# Patient Record
Sex: MALE | Race: BLACK OR AFRICAN AMERICAN | Employment: PART TIME | URBAN - METROPOLITAN AREA
[De-identification: names, ages, dates, MRNs, and addresses within clinical notes are randomized per-mention and may not be internally consistent; named-entity substitution may affect disease eponyms.]

---

## 2017-02-07 ENCOUNTER — OFFICE VISIT (OUTPATIENT)
Dept: FAMILY MEDICINE CLINIC | Age: 72
End: 2017-02-07

## 2017-02-07 ENCOUNTER — HOSPITAL ENCOUNTER (OUTPATIENT)
Dept: LAB | Age: 72
Discharge: HOME | End: 2017-02-07
Payer: MEDICARE

## 2017-02-07 VITALS
DIASTOLIC BLOOD PRESSURE: 74 MMHG | WEIGHT: 203.6 LBS | HEIGHT: 72 IN | OXYGEN SATURATION: 96 % | HEART RATE: 80 BPM | BODY MASS INDEX: 27.58 KG/M2 | SYSTOLIC BLOOD PRESSURE: 113 MMHG | RESPIRATION RATE: 12 BRPM | TEMPERATURE: 98.1 F

## 2017-02-07 DIAGNOSIS — D72.819 LEUKOPENIA, UNSPECIFIED TYPE: ICD-10-CM

## 2017-02-07 DIAGNOSIS — R60.0 LOCALIZED EDEMA: ICD-10-CM

## 2017-02-07 DIAGNOSIS — E87.6 HYPOKALEMIA: ICD-10-CM

## 2017-02-07 DIAGNOSIS — D69.6 PLATELETS DECREASED (HCC): ICD-10-CM

## 2017-02-07 DIAGNOSIS — E53.8 B12 DEFICIENCY: ICD-10-CM

## 2017-02-07 DIAGNOSIS — E78.00 HYPERCHOLESTEREMIA: ICD-10-CM

## 2017-02-07 DIAGNOSIS — E55.9 VITAMIN D DEFICIENCY: ICD-10-CM

## 2017-02-07 DIAGNOSIS — Z13.5 SCREENING FOR GLAUCOMA: ICD-10-CM

## 2017-02-07 DIAGNOSIS — R35.1 NOCTURIA: ICD-10-CM

## 2017-02-07 DIAGNOSIS — I10 ESSENTIAL HYPERTENSION: Primary | ICD-10-CM

## 2017-02-07 DIAGNOSIS — R73.03 PRE-DIABETES: ICD-10-CM

## 2017-02-07 LAB
BILIRUB UR QL STRIP: NEGATIVE
GLUCOSE UR-MCNC: NEGATIVE MG/DL
KETONES P FAST UR STRIP-MCNC: NEGATIVE MG/DL
PH UR STRIP: 7 [PH] (ref 4.6–8)
PROT UR QL STRIP: NEGATIVE MG/DL
SP GR UR STRIP: 1.02 (ref 1–1.03)
UA UROBILINOGEN AMB POC: NORMAL (ref 0.2–1)
URINALYSIS CLARITY POC: CLEAR
URINALYSIS COLOR POC: YELLOW
URINE BLOOD POC: NORMAL
URINE LEUKOCYTES POC: NEGATIVE
URINE NITRITES POC: NEGATIVE

## 2017-02-07 PROCEDURE — 82607 VITAMIN B-12: CPT

## 2017-02-07 PROCEDURE — 84153 ASSAY OF PSA TOTAL: CPT

## 2017-02-07 PROCEDURE — 80053 COMPREHEN METABOLIC PANEL: CPT

## 2017-02-07 PROCEDURE — 36415 COLL VENOUS BLD VENIPUNCTURE: CPT

## 2017-02-07 PROCEDURE — 85027 COMPLETE CBC AUTOMATED: CPT

## 2017-02-07 PROCEDURE — 80061 LIPID PANEL: CPT

## 2017-02-07 PROCEDURE — 84443 ASSAY THYROID STIM HORMONE: CPT

## 2017-02-07 RX ORDER — LISINOPRIL AND HYDROCHLOROTHIAZIDE 20; 25 MG/1; MG/1
TABLET ORAL
Qty: 90 TAB | Refills: 3 | Status: SHIPPED | OUTPATIENT
Start: 2017-02-07 | End: 2017-02-07 | Stop reason: SDUPTHER

## 2017-02-07 RX ORDER — GLUCOSAMINE SULFATE 1500 MG
POWDER IN PACKET (EA) ORAL
Qty: 30 CAP | Refills: 11 | Status: SHIPPED | OUTPATIENT
Start: 2017-02-07 | End: 2020-03-23 | Stop reason: SDUPTHER

## 2017-02-07 RX ORDER — FUROSEMIDE 40 MG/1
40 TABLET ORAL DAILY
Qty: 30 TAB | Refills: 1 | Status: SHIPPED | OUTPATIENT
Start: 2017-02-07 | End: 2017-03-21 | Stop reason: ALTCHOICE

## 2017-02-07 RX ORDER — POTASSIUM CHLORIDE 20 MEQ/1
20 TABLET, EXTENDED RELEASE ORAL DAILY
Qty: 30 TAB | Refills: 1 | Status: SHIPPED | OUTPATIENT
Start: 2017-02-07 | End: 2017-03-21 | Stop reason: SDUPTHER

## 2017-02-07 RX ORDER — GUAIFENESIN 100 MG/5ML
LIQUID (ML) ORAL
Qty: 30 TAB | Refills: 11 | Status: SHIPPED | OUTPATIENT
Start: 2017-02-07 | End: 2017-05-02 | Stop reason: SDUPTHER

## 2017-02-07 RX ORDER — LISINOPRIL AND HYDROCHLOROTHIAZIDE 20; 25 MG/1; MG/1
TABLET ORAL
Qty: 90 TAB | Refills: 3
Start: 2017-02-07 | End: 2017-08-02 | Stop reason: ALTCHOICE

## 2017-02-07 NOTE — PROGRESS NOTES
HISTORY OF PRESENT ILLNESS  Fe Bañuelos is a 73 y.o. male. HPI         HTN  Today pt present for Bp check and the patient stating that so far there has been a Compliancy w/ the bp meds, having had the low salt diet and  try to the best of pt's knowledge to avoid smoked meats,   the patient has been active life style and does do the daily walking,    ++ legs swelling no lightheadedness,    Having nl interest to do things not feeling depressed sleeping well    b12 def  Not having a vegetarian diet, not feeling tired, compliant with his [id number]      Current Outpatient Prescriptions   Medication Sig Dispense Refill    lisinopril-hydrochlorothiazide (PRINZIDE, ZESTORETIC) 20-25 mg per tablet TAKE ONE TABLET BY MOUTH EVERY DAY 90 Tab 3    aspirin (CHILDREN'S ASPIRIN) 81 mg chewable tablet TAKE ONE TABLET BY MOUTH EVERY DAY 30 Tab 11    Cholecalciferol, Vitamin D3, (VITAMIN D3) 1,000 unit cap One tab daily 30 Cap 11    cyanocobalamin 2,500 mcg Subl 1 Tab by SubLINGual route daily. 30 Tab 6    potassium chloride (K-DUR, KLOR-CON) 20 mEq tablet Take 1 Tab by mouth daily.  30 Tab 0     No Known Allergies  Past Medical History   Diagnosis Date    Elevated PSA, less than 10 ng/ml 12/10/2012    Hypertension     Hypokalemia 7/25/2014    Vitamin D deficiency 7/8/2014    WBC decreased 7/6/2012     Past Surgical History   Procedure Laterality Date    Hx orthopaedic       left knee surg in  1984    Colonoscopy,diagnostic  2/5/2015           Family History   Problem Relation Age of Onset    Kidney Disease Father      Social History   Substance Use Topics    Smoking status: Never Smoker    Smokeless tobacco: Never Used    Alcohol use No      Lab Results  Component Value Date/Time   WBC 3.6 08/02/2016 09:34 AM   HGB 14.2 08/02/2016 09:34 AM   HCT 42.8 08/02/2016 09:34 AM   PLATELET 819 40/25/6542 09:34 AM   MCV 87 08/02/2016 09:34 AM       Lab Results  Component Value Date/Time   Hemoglobin A1c 6.5 01/13/2016 09:03 AM Hemoglobin A1c 6.3 07/13/2015 09:18 AM   Hemoglobin A1c 6.4 01/26/2015 10:21 AM   Glucose 103 08/02/2016 09:34 AM   LDL, calculated 87 08/02/2016 09:34 AM   Creatinine 1.23 08/02/2016 09:34 AM      Lab Results  Component Value Date/Time   Cholesterol, total 151 08/02/2016 09:34 AM   HDL Cholesterol 49 08/02/2016 09:34 AM   LDL, calculated 87 08/02/2016 09:34 AM   Triglyceride 74 08/02/2016 09:34 AM       Lab Results  Component Value Date/Time   ALT (SGPT) 19 08/02/2016 09:34 AM   AST (SGOT) 52 08/02/2016 09:34 AM   Alk. phosphatase 79 08/02/2016 09:34 AM   Bilirubin, total 0.2 08/02/2016 09:34 AM       Lab Results  Component Value Date/Time   GFR est AA 69 08/02/2016 09:34 AM   GFR est non-AA 60 08/02/2016 09:34 AM   Creatinine 1.23 08/02/2016 09:34 AM   BUN 9 08/02/2016 09:34 AM   Sodium 140 08/02/2016 09:34 AM   Potassium 4.0 08/02/2016 09:34 AM   Chloride 100 08/02/2016 09:34 AM   CO2 29 08/02/2016 09:34 AM      Lab Results  Component Value Date/Time   Prostate Specific Ag 2.3 01/26/2015 10:21 AM   Prostate Specific Ag 2.9 07/08/2014 12:37 PM   Prostate Specific Ag 2.2 11/18/2013 08:06 AM        Review of Systems   Constitutional: Negative for chills and fever. HENT: Negative for ear pain and nosebleeds. Eyes: Negative for blurred vision, pain and discharge. Respiratory: Negative for shortness of breath. Cardiovascular: Positive for leg swelling. Negative for chest pain. Gastrointestinal: Negative for constipation, diarrhea, nausea and vomiting. Genitourinary: Negative for frequency. Musculoskeletal: Negative for joint pain. Skin: Negative for itching and rash. Neurological: Negative for headaches. Psychiatric/Behavioral: Negative for depression. The patient is not nervous/anxious. Physical Exam   Constitutional: He is oriented to person, place, and time. He appears well-developed and well-nourished. HENT:   Head: Normocephalic and atraumatic.    Mouth/Throat: No oropharyngeal exudate. Eyes: Conjunctivae and EOM are normal.   Neck: Normal range of motion. Neck supple. Cardiovascular: Normal rate, regular rhythm and normal heart sounds. No murmur heard. Pulmonary/Chest: Effort normal and breath sounds normal. No respiratory distress. Abdominal: Soft. Bowel sounds are normal. He exhibits no distension. There is no rebound. Musculoskeletal: He exhibits edema. He exhibits no tenderness. 3+   Neurological: He is alert and oriented to person, place, and time. Skin: Skin is warm. No erythema. Psychiatric: He has a normal mood and affect. His behavior is normal.   Nursing note and vitals reviewed. ASSESSMENT and Anne-Marie Landon was seen today for hypertension. Diagnoses and all orders for this visit:    Essential hypertension  -     CBC W/O DIFF  -     AMB POC URINALYSIS DIP STICK AUTO W/O MICRO  -     TSH 3RD GENERATION  -     METABOLIC PANEL, COMPREHENSIVE  -     Discontinue: lisinopril-hydroCHLOROthiazide (PRINZIDE, ZESTORETIC) 20-25 mg per tablet; TAKE ONE TABLET BY MOUTH EVERY DAY  -     aspirin (CHILDREN'S ASPIRIN) 81 mg chewable tablet; TAKE ONE TABLET BY MOUTH EVERY DAY  -     lisinopril-hydroCHLOROthiazide (PRINZIDE, ZESTORETIC) 20-25 mg per tablet; COLLINS 1/2TABLET BY MOUTH EVERY DAY    Vitamin D deficiency  -     cholecalciferol (VITAMIN D3) 1,000 unit cap;  One tab daily    Hypercholesteremia  -     LIPID PANEL  -     aspirin (CHILDREN'S ASPIRIN) 81 mg chewable tablet; TAKE ONE TABLET BY MOUTH EVERY DAY    Screening for glaucoma  -     REFERRAL TO OPTOMETRY    B12 deficiency  -     VITAMIN B12 & FOLATE    Pre-diabetes  -     Discontinue: lisinopril-hydroCHLOROthiazide (PRINZIDE, ZESTORETIC) 20-25 mg per tablet; TAKE ONE TABLET BY MOUTH EVERY DAY  -     aspirin (CHILDREN'S ASPIRIN) 81 mg chewable tablet; TAKE ONE TABLET BY MOUTH EVERY DAY  -     lisinopril-hydroCHLOROthiazide (PRINZIDE, ZESTORETIC) 20-25 mg per tablet; COLLINS 1/2TABLET BY MOUTH EVERY DAY    Platelets decreased ([address]  -     CBC W/O DIFF  -     TSH 3RD GENERATION  -     METABOLIC PANEL, COMPREHENSIVE  -     aspirin (CHILDREN'S ASPIRIN) 81 mg chewable tablet; TAKE ONE TABLET BY MOUTH EVERY DAY  -     PSA - PROSTATE SPECIFIC AG    Leukopenia, unspecified type  -     CBC W/O DIFF  -     TSH 3RD GENERATION  -     METABOLIC PANEL, COMPREHENSIVE  -     aspirin (CHILDREN'S ASPIRIN) 81 mg chewable tablet; TAKE ONE TABLET BY MOUTH EVERY DAY  -     PSA - PROSTATE SPECIFIC AG    Hypokalemia  -     METABOLIC PANEL, COMPREHENSIVE    Nocturia   -     PSA - PROSTATE SPECIFIC AG    Localized edema  -     furosemide (LASIX) 40 mg tablet; Take 1 Tab by mouth daily. -     potassium chloride (K-DUR, KLOR-CON) 20 mEq tablet; Take 1 Tab by mouth daily. -     lisinopril-hydroCHLOROthiazide (PRINZIDE, ZESTORETIC) 20-25 mg per tablet; COLLINS 1/2TABLET BY MOUTH EVERY DAY    was told rtc in 6 weeks for the leg swelling reeval and bp check  Cont with an active life style mod,for which includes to do list such as the light start of physical activities with a daily brisk walking 30 minutes most days of the week,  Trying to avoid fatty fast foods, have and continue with low-fat low-cholesterol diet,adding fatty fish such as Lawayne Calkin, [place]rel, [place] to the diet 3-4 times per week and finally have a low-salt and K rich food intake, all mention has to be done at least most days of the weeks for a better outcome than needed labs will be repeated in 3-6 months.   Handout also given to the patient for a better understanding,   Patient agreed with today's recommendation

## 2017-02-07 NOTE — MR AVS SNAPSHOT
Visit Information Date & Time Provider Department Dept. Phone Encounter #  
 2/7/2017  8:30 AM Car Chopra MD  [facility] OFFICE-ANN[facility] [phone] [id number] Follow-up Instructions Return in about 6 months (around 8/7/2017), or if symptoms worsen or fail to improve. Follow-up and Disposition History Upcoming Health Maintenance Date Due  
 GLAUCOMA SCREENING Q2Y 2/9/2012 MEDICARE YEARLY EXAM 8/3/2017 COLON CANCER SCRN (BARIUM / CT COLO / FLX SIG) Q5 2/5/2020 DTaP/Tdap/Td series (2 - Td) 7/8/2024 Allergies as of 2/7/2017  Review Complete On: 8/2/2016 By: Bisi Covarrubias LPN No Known Allergies Current Immunizations  Reviewed on 2/7/2017 Name Date Pneumococcal Conjugate (PCV-13) 7/13/2015  9:26 AM  
 Pneumococcal Polysaccharide (PPSV-23) 7/8/2014 Tdap 7/8/2014 Reviewed by Car Cohpra MD on 2/7/2017 at  9:11 AM  
 Reviewed by Car Chopra MD on 2/7/2017 at  9:11 AM  
You Were Diagnosed With   
  
 Codes Comments Essential hypertension    -  Primary ICD-10-CM: I10 
ICD-9-CM: 401.9 Vitamin D deficiency     ICD-10-CM: E55.9 ICD-9-CM: 268.9 Hypercholesteremia     ICD-10-CM: E78.00 ICD-9-CM: 272.0 Screening for glaucoma     ICD-10-CM: Z13.5 ICD-9-CM: V80.1 B12 deficiency     ICD-10-CM: E53.8 ICD-9-CM: 266.2 Pre-diabetes     ICD-10-CM: R73.03 
ICD-9-CM: 790.29 Platelets decreased ([facility] [address]     ICD-10-CM: D69.6 ICD-9-CM: 287.5 Leukopenia, unspecified type     ICD-10-CM: D72.819 ICD-9-CM: 288.50 Hypokalemia     ICD-10-CM: E87.6 ICD-9-CM: 276.8 Nocturia     ICD-10-CM: R35.1 ICD-9-CM: 788.43 Vitals BP Pulse Temp Resp Height(growth percentile) Weight(growth percentile) 113/74 (BP 1 Location: Left arm, BP Patient Position: At rest) 80 98.1 °F (36.7 °C) (Oral) 12 6' (1.829 m) 203 lb 9.6 oz (92.4 kg) SpO2 BMI Smoking Status 96% 27.61 kg/m2 Never Smoker Vitals History BMI and BSA Data Body Mass Index Body Surface Area  
 27.61 kg/m 2 2.17 m 2 Preferred Pharmacy Pharmacy Name Phone [facility] [address] [place] [address] [phone] Your Updated Medication List  
  
   
This list is accurate as of: 2/7/17  9:12 AM.  Always use your most recent med list.  
  
  
  
  
 aspirin 81 mg chewable tablet Commonly known as:  CHILDREN'S ASPIRIN  
TAKE ONE TABLET BY MOUTH EVERY DAY  
  
 cholecalciferol 1,000 unit Cap Commonly known as:  VITAMIN D3 One tab daily  
  
 cyanocobalamin 2,500 mcg sublingual tablet Commonly known as:  VITAMIN B12  
1 Tab by SubLINGual route daily. lisinopril-hydroCHLOROthiazide 20-25 mg per tablet Commonly known as:  PRINZIDE, ZESTORETIC  
TAKE ONE TABLET BY MOUTH EVERY DAY  
  
 potassium chloride 20 mEq tablet Commonly known as:  K-DUR, KLOR-CON Take 1 Tab by mouth daily. Prescriptions Sent to Pharmacy Refills  
 lisinopril-hydroCHLOROthiazide (PRINZIDE, ZESTORETIC) 20-25 mg per tablet 3 Sig: TAKE ONE TABLET BY MOUTH EVERY DAY Class: Normal  
 Pharmacy: [id number], [address] [facility] Ph #: [phone]  
 aspirin (CHILDREN'S ASPIRIN) 81 mg chewable tablet 11 Sig: TAKE ONE TABLET BY MOUTH EVERY DAY Class: Normal  
 Pharmacy: [id number] [place] [address] Ph #: [phone] cholecalciferol (VITAMIN D3) 1,000 unit cap 11 Sig: One tab daily Class: Normal  
 Pharmacy: [id number] Ph #: [phone] We Performed the Following AMB POC URINALYSIS DIP STICK AUTO W/O MICRO [71227 CPT(R)] CBC W/O DIFF [73899 CPT(R)] LIPID PANEL [43570 CPT(R)] METABOLIC PANEL, COMPREHENSIVE [56281 CPT(R)] PSA DIAGNOSTIC (PROSTATIC SPECIFIC AG) [id number] CPT(R)] REFERRAL TO OPTOMETRY [id number] Custom] Comments:  
 Please evaluate patient for glaucoma. TSH 3RD GENERATION [88459 CPT(R)] VITAMIN B12 & FOLATE [96642 CPT(R)] Follow-up Instructions Return in about 6 months (around 8/7/2017), or if symptoms worsen or fail to improve. Referral Information Referral ID Referred By Referred To  
  
 [id number] RIGOBERTO BLACKWELL MD   
   [address], [address] Phone: [phone] Fax: [phone] Visits Status Start Date End Date 1 New Request 2/7/17 2/7/18 If your referral has a status of pending review or denied, additional information will be sent to support the outcome of this decision. Patient Instructions Learning About High Blood Pressure What is high blood pressure? Blood pressure is a measure of how hard the blood pushes against the walls of your arteries. It's normal for blood pressure to go up and down throughout the day, but if it stays up, you have high blood pressure. Another name for high blood pressure is hypertension. Two numbers tell you your blood pressure. The first number is the systolic pressure. It shows how hard the blood pushes when your heart is pumping. The second number is the diastolic pressure. It shows how hard the blood pushes between heartbeats, when your heart is relaxed and filling with blood. A blood pressure of less than 120/80 (say \"120 over 80\") is ideal for an adult. High blood pressure is 140/90 or higher. You have high blood pressure if your top number is 140 or higher or your bottom number is 90 or higher, or both. Many people fall into the category in between, called prehypertension. People with prehypertension need to make lifestyle changes to bring their blood pressure down and help prevent or delay high blood pressure. What happens when you have high blood pressure? · Blood flows through your arteries with too much force.  Over time, this damages the walls of your arteries. But you can't feel it. High blood pressure usually doesn't cause symptoms. · Fat and calcium start to build up in your arteries. This buildup is called plaque. Plaque makes your arteries narrower and stiffer. Blood can't flow through them as easily. · This lack of good blood flow starts to damage some of the organs in your body. This can lead to problems such as coronary artery disease and heart attack, heart failure, stroke, kidney failure, and eye damage. How can you prevent high blood pressure? · Stay at a healthy weight. · Try to limit how much sodium you eat to less than 2,300 milligrams (mg) a day. If you limit your sodium to 1,500 mg a day, you can lower your blood pressure even more. ¨ Buy foods that are labeled \"unsalted,\" \"sodium-free,\" or \"low-sodium. \" Foods labeled \"reduced-sodium\" and \"light sodium\" may still have too much sodium. ¨ Flavor your food with garlic, lemon juice, onion, vinegar, herbs, and spices instead of salt. Do not use soy sauce, steak sauce, onion salt, garlic salt, mustard, or ketchup on your food. ¨ Use less salt (or none) when recipes call for it. You can often use half the salt a recipe calls for without losing flavor. · Be physically active. Get at least 30 minutes of exercise on most days of the week. Walking is a good choice. You also may want to do other activities, such as running, swimming, cycling, or playing tennis or team sports. · Limit alcohol to 2 drinks a day for men and 1 drink a day for women. · Eat plenty of fruits, vegetables, and low-fat dairy products. Eat less saturated and total fats. How is high blood pressure treated? · Your doctor will suggest making lifestyle changes. For example, your doctor may ask you to eat healthy foods, quit smoking, lose extra weight, and be more active. · If lifestyle changes don't help enough or your blood pressure is very high, you will have to take medicine every day. Follow-up care is a key part of your treatment and safety. Be sure to make and go to all appointments, and call your doctor if you are having problems. It's also a good idea to know your test results and keep a list of the medicines you take. Where can you learn more? Go to http://tiffany-clare.info/. Enter P501 in the search box to learn more about \"Learning About High Blood Pressure. \" Current as of: March 23, 2016 Content Version: 11.1 © 6357-9312 [employer]. Care instructions adapted under license by [employer] (which disclaims liability or warranty for this information). If you have questions about a medical condition or this instruction, always ask your healthcare professional. [address] any warranty or liability for your use of this information. Introducing [facility] & HEALTH SERVICES! [employer] introduces [employer] patient portal. Now you can access parts of your medical record, email your doctor's office, and request medication refills online. 1. In your internet browser, go to [email or web address] 2. Click on the First Time User? Click Here link in the Sign In box. You will see the New Member Sign Up page. 3. Enter your [employer] Access Code exactly as it appears below. You will not need to use this code after youve completed the sign-up process. If you do not sign up before the expiration date, you must request a new code. · [employer] Access Code: [email or web address] Expires: 5/8/2017  9:12 AM 
 
4. Enter the last four digits of your Social Security Number (xxxx) and Date of Birth (mm/dd/yyyy) as indicated and click Submit. You will be taken to the next sign-up page. 5. Create a [employer] ID. This will be your [employer] login ID and cannot be changed, so think of one that is secure and easy to remember. 6. Create a [employer] password. You can change your password at any time. 7. Enter your Password Reset Question and Answer. This can be used at a later time if you forget your password. 8. Enter your e-mail address. You will receive e-mail notification when new information is available in [address] 9. Click Sign Up. You can now view and download portions of your medical record. 10. Click the Download Summary menu link to download a portable copy of your medical information. If you have questions, please visit the Frequently Asked Questions section of the [employer] website. Remember, [employer] is NOT to be used for urgent needs. For medical emergencies, dial 911. Now available from your iPhone and Android! Please provide this summary of care documentation to your next provider. Your primary care clinician is listed as Diego Shepherd. If you have any questions after today's visit, please call [phone].

## 2017-02-07 NOTE — PROGRESS NOTES
Jo Wood        Name and  verified        Chief Complaint   Patient presents with    Hypertension     6 month f/u       Health Maintenance reviewed-discussed with patient.

## 2017-02-08 LAB
ALBUMIN SERPL-MCNC: 4.2 G/DL (ref 3.6–4.8)
ALBUMIN/GLOB SERPL: 1.9 {RATIO} (ref 1.1–2.5)
ALP SERPL-CCNC: 62 IU/L (ref 39–117)
ALT SERPL-CCNC: 20 IU/L (ref 0–44)
AST SERPL-CCNC: 30 IU/L (ref 0–40)
BILIRUB SERPL-MCNC: 0.3 MG/DL (ref 0–1.2)
BUN SERPL-MCNC: 11 MG/DL (ref 8–27)
BUN/CREAT SERPL: 10 (ref 10–22)
CALCIUM SERPL-MCNC: 9.5 MG/DL (ref 8.6–10.2)
CHLORIDE SERPL-SCNC: 99 MMOL/L (ref 96–106)
CHOLEST SERPL-MCNC: 139 MG/DL (ref 100–199)
CO2 SERPL-SCNC: 30 MMOL/L (ref 18–29)
CREAT SERPL-MCNC: 1.06 MG/DL (ref 0.76–1.27)
ERYTHROCYTE [DISTWIDTH] IN BLOOD BY AUTOMATED COUNT: 14 % (ref 12.3–15.4)
FOLATE SERPL-MCNC: >20 NG/ML
GLOBULIN SER CALC-MCNC: 2.2 G/DL (ref 1.5–4.5)
GLUCOSE SERPL-MCNC: 101 MG/DL (ref 65–99)
HCT VFR BLD AUTO: 43.1 % (ref 37.5–51)
HDLC SERPL-MCNC: 59 MG/DL
HGB BLD-MCNC: 14.3 G/DL (ref 12.6–17.7)
LDLC SERPL CALC-MCNC: 68 MG/DL (ref 0–99)
MCH RBC QN AUTO: 28.4 PG (ref 26.6–33)
MCHC RBC AUTO-ENTMCNC: 33.2 G/DL (ref 31.5–35.7)
MCV RBC AUTO: 86 FL (ref 79–97)
NRBC BLD AUTO-RTO: 0 %
PLATELET # BLD AUTO: 120 X10E3/UL (ref 150–379)
POTASSIUM SERPL-SCNC: 4.3 MMOL/L (ref 3.5–5.2)
PROT SERPL-MCNC: 6.4 G/DL (ref 6–8.5)
PSA SERPL-MCNC: 1.6 NG/ML (ref 0–4)
RBC # BLD AUTO: 5.03 X10E6/UL (ref 4.14–5.8)
SODIUM SERPL-SCNC: 142 MMOL/L (ref 134–144)
TRIGL SERPL-MCNC: 62 MG/DL (ref 0–149)
TSH SERPL DL<=0.005 MIU/L-ACNC: 2.18 UIU/ML (ref 0.45–4.5)
VIT B12 SERPL-MCNC: 231 PG/ML (ref 211–946)
VLDLC SERPL CALC-MCNC: 12 MG/DL (ref 5–40)
WBC # BLD AUTO: 3.1 X10E3/UL (ref 3.4–10.8)

## 2017-03-21 ENCOUNTER — OFFICE VISIT (OUTPATIENT)
Dept: FAMILY MEDICINE CLINIC | Age: 72
End: 2017-03-21

## 2017-03-21 ENCOUNTER — HOSPITAL ENCOUNTER (OUTPATIENT)
Dept: LAB | Age: 72
Discharge: HOME | End: 2017-03-21
Payer: MEDICARE

## 2017-03-21 VITALS
HEIGHT: 72 IN | BODY MASS INDEX: 28.55 KG/M2 | HEART RATE: 69 BPM | WEIGHT: 210.8 LBS | RESPIRATION RATE: 14 BRPM | TEMPERATURE: 97.7 F | DIASTOLIC BLOOD PRESSURE: 85 MMHG | OXYGEN SATURATION: 98 % | SYSTOLIC BLOOD PRESSURE: 123 MMHG

## 2017-03-21 DIAGNOSIS — Z23 ENCOUNTER FOR IMMUNIZATION: Primary | ICD-10-CM

## 2017-03-21 DIAGNOSIS — N42.9 DISORDER OF PROSTATE: ICD-10-CM

## 2017-03-21 DIAGNOSIS — Z13.5 SCREENING FOR GLAUCOMA: ICD-10-CM

## 2017-03-21 DIAGNOSIS — R60.0 LOCALIZED EDEMA: ICD-10-CM

## 2017-03-21 PROCEDURE — 84153 ASSAY OF PSA TOTAL: CPT

## 2017-03-21 PROCEDURE — 36415 COLL VENOUS BLD VENIPUNCTURE: CPT

## 2017-03-21 PROCEDURE — 85027 COMPLETE CBC AUTOMATED: CPT

## 2017-03-21 PROCEDURE — 80053 COMPREHEN METABOLIC PANEL: CPT

## 2017-03-21 RX ORDER — BUMETANIDE 2 MG/1
2 TABLET ORAL DAILY
Qty: 30 TAB | Refills: 1 | Status: SHIPPED | OUTPATIENT
Start: 2017-03-21 | End: 2017-05-02 | Stop reason: SDUPTHER

## 2017-03-21 RX ORDER — POTASSIUM CHLORIDE 20 MEQ/1
20 TABLET, EXTENDED RELEASE ORAL DAILY
Qty: 30 TAB | Refills: 1 | Status: SHIPPED | OUTPATIENT
Start: 2017-03-21 | End: 2017-05-02 | Stop reason: SDUPTHER

## 2017-03-21 NOTE — MR AVS SNAPSHOT
Visit Information Date & Time Provider Department Dept. Phone Encounter #  
 3/21/2017  8:45 AM Jose Guadalupe Ag MD [address] OFFICE-ANNEX [phone] [id number] Upcoming Health Maintenance Date Due  
 GLAUCOMA SCREENING Q2Y 2/9/2012 MEDICARE YEARLY EXAM 8/3/2017 COLON CANCER SCRN (BARIUM / CT COLO / FLX SIG) Q5 2/5/2020 DTaP/Tdap/Td series (2 - Td) 7/8/2024 Allergies as of 3/21/2017  Review Complete On: 3/21/2017 By: Drew Ely LPN No Known Allergies Current Immunizations  Reviewed on 2/7/2017 Name Date Pneumococcal Conjugate (PCV-13) 7/13/2015  9:26 AM  
 Pneumococcal Polysaccharide (PPSV-23) 7/8/2014 Tdap 7/8/2014 Not reviewed this visit You Were Diagnosed With   
  
 Codes Comments Encounter for immunization    -  Primary ICD-10-CM: V43 ICD-9-CM: V03.89 Screening for glaucoma     ICD-10-CM: Z13.5 ICD-9-CM: V80.1 Localized edema     ICD-10-CM: R60.0 ICD-9-CM: 025. 3 Disorder of prostate     ICD-10-CM: N42.9 ICD-9-CM: 602.9 Vitals BP Pulse Temp Resp Height(growth percentile) Weight(growth percentile) 123/85 (BP 1 Location: Left arm, BP Patient Position: At rest) 69 97.7 °F (36.5 °C) (Oral) 14 6' (1.829 m) 210 lb 12.8 oz (95.6 kg) SpO2 BMI Smoking Status 98% 28.59 kg/m2 Never Smoker Vitals History BMI and BSA Data Body Mass Index Body Surface Area 28.59 kg/m 2 2.2 m 2 Preferred Pharmacy Pharmacy Name Phone [facility] [address], [address] [place] [address] Avenue [phone] Your Updated Medication List  
  
   
This list is accurate as of: 3/21/17  9:59 AM.  Always use your most recent med list.  
  
  
  
  
 aspirin 81 mg chewable tablet Commonly known as:  CHILDREN'S ASPIRIN  
TAKE ONE TABLET BY MOUTH EVERY DAY  
  
 bumetanide 2 mg tablet Commonly known as:  Pedro Wes Take 1 Tab by mouth daily. Indications: Edema cholecalciferol 1,000 unit Cap Commonly known as:  VITAMIN D3 One tab daily  
  
 cyanocobalamin 2,500 mcg sublingual tablet Commonly known as:  VITAMIN B12  
1 Tab by SubLINGual route daily. lisinopril-hydroCHLOROthiazide 20-25 mg per tablet Commonly known as:  PRINZIDE, ZESTORETIC  
COLLINS 1/2TABLET BY MOUTH EVERY DAY  
  
 potassium chloride 20 mEq tablet Commonly known as:  K-DUR, KLOR-CON Take 1 Tab by mouth daily. varicella zoster vacine live 19,400 unit/0.65 mL Susr injection Commonly known as:  ZOSTAVAX  
1 Vial by SubCUTAneous route once for 1 dose. Prescriptions Printed Refills  
 varicella zoster vacine live (ZOSTAVAX) 19,400 unit/0.65 mL susr injection 0 Si Vial by SubCUTAneous route once for 1 dose. Class: Print Route: SubCUTAneous Prescriptions Sent to Pharmacy Refills  
 potassium chloride (K-DUR, KLOR-CON) 20 mEq tablet 1 Sig: Take 1 Tab by mouth daily. Class: Normal  
 Pharmacy: [address] Ph #: [phone] Route: Oral  
 bumetanide (BUMEX) 2 mg tablet 1 Sig: Take 1 Tab by mouth daily. Indications: Edema Class: Normal  
 Pharmacy: [address] Ph #: [phone] Route: Oral  
  
We Performed the Following CBC W/O DIFF [87074 CPT(R)] METABOLIC PANEL, COMPREHENSIVE [41505 CPT(R)] PROSTATE SPECIFIC AG (PSA) [id number] CPT(R)] REFERRAL TO OPTOMETRY [id number] Custom] Comments:  
 Please evaluate patient for glaucoma. Referral Information Referral ID Referred By Referred To  
  
 [id number] RIGOBERTO BLACKWELL MD   
   [address], [address] Phone: [phone] Fax: [phone] Visits Status Start Date End Date 1 New Request 3/21/17 3/21/18  If your referral has a status of pending review or denied, additional information will be sent to support the outcome of this decision. Introducing [facility] & HEALTH SERVICES! [facility] introduces [employer] patient portal. Now you can access parts of your medical record, email your doctor's office, and request medication refills online. 1. In your internet browser, go to [email or web address] 2. Click on the First Time User? Click Here link in the Sign In box. You will see the New Member Sign Up page. 3. Enter your [employer] Access Code exactly as it appears below. You will not need to use this code after youve completed the sign-up process. If you do not sign up before the expiration date, you must request a new code. · [employer] Access Code: [email or web address] Expires: 5/8/2017 10:12 AM 
 
4. Enter the last four digits of your Social Security Number (xxxx) and Date of Birth (mm/dd/yyyy) as indicated and click Submit. You will be taken to the next sign-up page. 5. Create a [employer] ID. This will be your [employer] login ID and cannot be changed, so think of one that is secure and easy to remember. 6. Create a [employer] password. You can change your password at any time. 7. Enter your Password Reset Question and Answer. This can be used at a later time if you forget your password. 8. Enter your e-mail address. You will receive e-mail notification when new information is available in [address] 9. Click Sign Up. You can now view and download portions of your medical record. 10. Click the Download Summary menu link to download a portable copy of your medical information. If you have questions, please visit the Frequently Asked Questions section of the [employer] website. Remember, [employer] is NOT to be used for urgent needs. For medical emergencies, dial 911. Now available from your iPhone and Android! Please provide this summary of care documentation to your next provider. Your primary care clinician is listed as Bill Perez.  If you have any questions after today's visit, please call [phone].

## 2017-03-21 NOTE — PROGRESS NOTES
HISTORY OF PRESENT ILLNESS  Tosin Delgado is a 81 y.o. male. HPI   Patient present with b/lSwelling of the lower ext,   Stating that he does a lot of walking but unfortunately walking has not been helping the swelling to go away, on the pt last visits, he does not have much of the legs swelling like the last visit and his weight was also better than today's weight,  Today the patient denies leg pain, denies rash, and legs lesion, in addition the pt stated that denial of dizziness,  the legs B swelling not only not better but also the wt went up by close to 7 pounds, pt was givne lasix and klor con, states that he stayed compliant with given meds   Vitals 3/21/2017 2/7/2017   Weight 210 lb 12.8 oz 203 lb 9.6 oz       Current Outpatient Prescriptions   Medication Sig Dispense Refill    aspirin (CHILDREN'S ASPIRIN) 81 mg chewable tablet TAKE ONE TABLET BY MOUTH EVERY DAY 30 Tab 11    cholecalciferol (VITAMIN D3) 1,000 unit cap One tab daily 30 Cap 11    furosemide (LASIX) 40 mg tablet Take 1 Tab by mouth daily. 30 Tab 1    potassium chloride (K-DUR, KLOR-CON) 20 mEq tablet Take 1 Tab by mouth daily. 30 Tab 1    lisinopril-hydroCHLOROthiazide (PRINZIDE, ZESTORETIC) 20-25 mg per tablet COLLINS 1/2TABLET BY MOUTH EVERY DAY 90 Tab 3    cyanocobalamin 2,500 mcg Subl 1 Tab by SubLINGual route daily.  30 Tab 6     No Known Allergies  Past Medical History:   Diagnosis Date    Elevated PSA, less than 10 ng/ml 12/10/2012    Hypertension     Hypokalemia 7/25/2014    Localized edema 2/7/2017    Vitamin D deficiency 7/8/2014    WBC decreased 7/6/2012     Past Surgical History:   Procedure Laterality Date    COLONOSCOPY,DIAGNOSTIC  2/5/2015         HX ORTHOPAEDIC      left knee surg in  1984     Family History   Problem Relation Age of Onset    Kidney Disease Father      Social History   Substance Use Topics    Smoking status: Never Smoker    Smokeless tobacco: Never Used    Alcohol use No      Lab Results  Component Value Date/Time   WBC 3.1 02/07/2017 09:21 AM   HGB 14.3 02/07/2017 09:21 AM   HCT 43.1 02/07/2017 09:21 AM   PLATELET 881 66/28/5419 09:21 AM   MCV 86 02/07/2017 09:21 AM       Lab Results  Component Value Date/Time   GFR est AA 82 02/07/2017 09:21 AM   GFR est non-AA 71 02/07/2017 09:21 AM   Creatinine 1.06 02/07/2017 09:21 AM   BUN 11 02/07/2017 09:21 AM   Sodium 142 02/07/2017 09:21 AM   Potassium 4.3 02/07/2017 09:21 AM   Chloride 99 02/07/2017 09:21 AM   CO2 30 02/07/2017 09:21 AM         Review of Systems   Constitutional: Negative for chills and fever. HENT: Negative for ear pain and nosebleeds. Eyes: Negative for blurred vision, pain and discharge. Respiratory: Negative for shortness of breath. Cardiovascular: Positive for leg swelling. Negative for chest pain. Gastrointestinal: Negative for constipation, diarrhea, nausea and vomiting. Genitourinary: Negative for frequency. Musculoskeletal: Negative for joint pain. Skin: Negative for itching and rash. Neurological: Negative for headaches. Psychiatric/Behavioral: Negative for depression. The patient is not nervous/anxious. Physical Exam   Constitutional: He is oriented to person, place, and time. He appears well-developed and well-nourished. HENT:   Head: Normocephalic and atraumatic. Mouth/Throat: No oropharyngeal exudate. Eyes: Conjunctivae and EOM are normal.   Neck: Normal range of motion. Neck supple. Cardiovascular: Normal rate, regular rhythm and normal heart sounds. No murmur heard. Pulmonary/Chest: Effort normal and breath sounds normal. No respiratory distress. Abdominal: Soft. Bowel sounds are normal. He exhibits no distension. There is no rebound. Musculoskeletal: He exhibits edema. He exhibits no tenderness. Neurological: He is alert and oriented to person, place, and time. Skin: Skin is warm. No erythema. Psychiatric: He has a normal mood and affect.  His behavior is normal.   Nursing note and vitals reviewed. ASSESSMENT and PLAN  [facility]. Diagnoses and all orders for this visit:    Encounter for immunization  -     REFERRAL TO OPTOMETRY  -     varicella zoster vacine live (ZOSTAVAX) 19,400 unit/0.65 mL susr injection; 1 Vial by SubCUTAneous route once for 1 dose. -     potassium chloride (K-DUR, KLOR-CON) 20 mEq tablet; Take 1 Tab by mouth daily. -     bumetanide (BUMEX) 2 mg tablet; Take 1 Tab by mouth daily. Indications: Edema  -     CBC W/O DIFF  -     METABOLIC PANEL, COMPREHENSIVE  -     PSA - PROSTATE SPECIFIC AG    Screening for glaucoma  -     REFERRAL TO OPTOMETRY  -     varicella zoster vacine live (ZOSTAVAX) 19,400 unit/0.65 mL susr injection; 1 Vial by SubCUTAneous route once for 1 dose. -     potassium chloride (K-DUR, KLOR-CON) 20 mEq tablet; Take 1 Tab by mouth daily. -     bumetanide (BUMEX) 2 mg tablet; Take 1 Tab by mouth daily. Indications: Edema  -     CBC W/O DIFF  -     METABOLIC PANEL, COMPREHENSIVE  -     PSA - PROSTATE SPECIFIC AG    Localized edema  -     REFERRAL TO OPTOMETRY  -     varicella zoster vacine live (ZOSTAVAX) 19,400 unit/0.65 mL susr injection; 1 Vial by SubCUTAneous route once for 1 dose. -     potassium chloride (K-DUR, KLOR-CON) 20 mEq tablet; Take 1 Tab by mouth daily. -     bumetanide (BUMEX) 2 mg tablet; Take 1 Tab by mouth daily.  Indications: Edema  -     CBC W/O DIFF  -     METABOLIC PANEL, COMPREHENSIVE  -     PSA - PROSTATE SPECIFIC AG    Disorder of prostate   -     PSA - PROSTATE SPECIFIC AG      Patient did not respond to Lasix 40 mg once daily possible indication of resistancy to furosemide, at this time we will change her medication for better outcome patient acknowledged understanding obtained kidney function test today as well as liver function test to rule out any abnormality with the kidney and liver, reevaluate patient in 6 weeks for better outcome if patient weight and the swelling does not go away in 6 weeks patient will be sent to heart doctor for further care patient had 3 and acknowledged understanding  Leg elevation at all times or most of the times, support hoses b/l 24hrs per day, ambulation as possible, use of two pillows at nights while in bed

## 2017-03-22 LAB
ALBUMIN SERPL-MCNC: 4.2 G/DL (ref 3.5–4.8)
ALBUMIN/GLOB SERPL: 1.6 {RATIO} (ref 1.2–2.2)
ALP SERPL-CCNC: 68 IU/L (ref 39–117)
ALT SERPL-CCNC: 23 IU/L (ref 0–44)
AST SERPL-CCNC: 33 IU/L (ref 0–40)
BILIRUB SERPL-MCNC: 0.4 MG/DL (ref 0–1.2)
BUN SERPL-MCNC: 9 MG/DL (ref 8–27)
BUN/CREAT SERPL: 8 (ref 10–22)
CALCIUM SERPL-MCNC: 9.7 MG/DL (ref 8.6–10.2)
CHLORIDE SERPL-SCNC: 97 MMOL/L (ref 96–106)
CO2 SERPL-SCNC: 27 MMOL/L (ref 18–29)
CREAT SERPL-MCNC: 1.1 MG/DL (ref 0.76–1.27)
ERYTHROCYTE [DISTWIDTH] IN BLOOD BY AUTOMATED COUNT: 15 % (ref 12.3–15.4)
GLOBULIN SER CALC-MCNC: 2.6 G/DL (ref 1.5–4.5)
GLUCOSE SERPL-MCNC: 106 MG/DL (ref 65–99)
HCT VFR BLD AUTO: 42.2 % (ref 37.5–51)
HGB BLD-MCNC: 14.3 G/DL (ref 12.6–17.7)
MCH RBC QN AUTO: 29.1 PG (ref 26.6–33)
MCHC RBC AUTO-ENTMCNC: 33.9 G/DL (ref 31.5–35.7)
MCV RBC AUTO: 86 FL (ref 79–97)
NRBC BLD AUTO-RTO: 0 %
PLATELET # BLD AUTO: 122 X10E3/UL (ref 150–379)
POTASSIUM SERPL-SCNC: 4.5 MMOL/L (ref 3.5–5.2)
PROT SERPL-MCNC: 6.8 G/DL (ref 6–8.5)
PSA SERPL-MCNC: 2.2 NG/ML (ref 0–4)
RBC # BLD AUTO: 4.91 X10E6/UL (ref 4.14–5.8)
SODIUM SERPL-SCNC: 141 MMOL/L (ref 134–144)
WBC # BLD AUTO: 3.8 X10E3/UL (ref 3.4–10.8)

## 2017-05-02 ENCOUNTER — OFFICE VISIT (OUTPATIENT)
Dept: FAMILY MEDICINE CLINIC | Age: 72
End: 2017-05-02

## 2017-05-02 VITALS
HEART RATE: 67 BPM | HEIGHT: 72 IN | BODY MASS INDEX: 28.2 KG/M2 | TEMPERATURE: 98 F | OXYGEN SATURATION: 95 % | WEIGHT: 208.2 LBS | SYSTOLIC BLOOD PRESSURE: 120 MMHG | RESPIRATION RATE: 14 BRPM | DIASTOLIC BLOOD PRESSURE: 75 MMHG

## 2017-05-02 DIAGNOSIS — R60.0 LOCALIZED EDEMA: Primary | ICD-10-CM

## 2017-05-02 DIAGNOSIS — I10 ESSENTIAL HYPERTENSION: ICD-10-CM

## 2017-05-02 DIAGNOSIS — Z13.5 SCREENING FOR GLAUCOMA: ICD-10-CM

## 2017-05-02 DIAGNOSIS — D69.6 PLATELETS DECREASED (HCC): ICD-10-CM

## 2017-05-02 DIAGNOSIS — E78.00 HYPERCHOLESTEREMIA: ICD-10-CM

## 2017-05-02 DIAGNOSIS — R73.03 PRE-DIABETES: ICD-10-CM

## 2017-05-02 DIAGNOSIS — D72.819 LEUKOPENIA, UNSPECIFIED TYPE: ICD-10-CM

## 2017-05-02 DIAGNOSIS — Z23 ENCOUNTER FOR IMMUNIZATION: ICD-10-CM

## 2017-05-02 RX ORDER — POTASSIUM CHLORIDE 20 MEQ/1
20 TABLET, EXTENDED RELEASE ORAL DAILY
Qty: 30 TAB | Refills: 3 | Status: SHIPPED | OUTPATIENT
Start: 2017-05-02 | End: 2017-08-02 | Stop reason: SDUPTHER

## 2017-05-02 RX ORDER — BUMETANIDE 2 MG/1
2 TABLET ORAL DAILY
Qty: 30 TAB | Refills: 3 | Status: SHIPPED | OUTPATIENT
Start: 2017-05-02 | End: 2017-08-02 | Stop reason: SDUPTHER

## 2017-05-02 RX ORDER — GUAIFENESIN 100 MG/5ML
LIQUID (ML) ORAL
Qty: 30 TAB | Refills: 11
Start: 2017-05-02 | End: 2019-09-18 | Stop reason: ALTCHOICE

## 2017-05-02 NOTE — PROGRESS NOTES
Hermilo Hoist        Name and  verified        Chief Complaint   Patient presents with    Results     6 week f/u    Ankle swelling     f/u

## 2017-05-02 NOTE — PROGRESS NOTES
HISTORY OF PRESENT ILLNESS  Tia Posaads is a 81 y.o. male. HPI   Patient present with b/lSwelling of the lower ext,   Stating that he does a lot of walking but unfortunately walking has not been helping the swelling to go away, on the pt last visits, he did not have much of the legs swelling and his weight was also better than today's weight,  Today the patient denies leg pain, denies rash, and legs lesion, in addition the pt stated that denial of dizziness,  the legs B swelling not only not better but also the wt went up by close to 10 pounds, wasdooing well till ran out of his med   Vitals 5/2/2017 3/21/2017 2/7/2017   Weight 208 lb 3.2 oz 210 lb 12.8 oz 203 lb 9.6 oz         Current Outpatient Prescriptions   Medication Sig Dispense Refill    potassium chloride (K-DUR, KLOR-CON) 20 mEq tablet Take 1 Tab by mouth daily. 30 Tab 1    bumetanide (BUMEX) 2 mg tablet Take 1 Tab by mouth daily. Indications: Edema 30 Tab 1    cholecalciferol (VITAMIN D3) 1,000 unit cap One tab daily 30 Cap 11    lisinopril-hydroCHLOROthiazide (PRINZIDE, ZESTORETIC) 20-25 mg per tablet COLLINS 1/2TABLET BY MOUTH EVERY DAY 90 Tab 3    cyanocobalamin 2,500 mcg Subl 1 Tab by SubLINGual route daily.  30 Tab 6    aspirin (CHILDREN'S ASPIRIN) 81 mg chewable tablet TAKE ONE TABLET BY MOUTH EVERY DAY 30 Tab 11     No Known Allergies  Past Medical History:   Diagnosis Date    Elevated PSA, less than 10 ng/ml 12/10/2012    Hypertension     Hypokalemia 7/25/2014    Localized edema 2/7/2017    Vitamin D deficiency 7/8/2014    WBC decreased 7/6/2012     Past Surgical History:   Procedure Laterality Date    COLONOSCOPY,DIAGNOSTIC  2/5/2015         HX ORTHOPAEDIC      left knee surg in  1984     Family History   Problem Relation Age of Onset    Kidney Disease Father      Social History   Substance Use Topics    Smoking status: Never Smoker    Smokeless tobacco: Never Used    Alcohol use No      Lab Results  Component Value Date/Time   WBC 3.8 03/21/2017 10:01 AM   HGB 14.3 03/21/2017 10:01 AM   HCT 42.2 03/21/2017 10:01 AM   PLATELET 603 66/63/9849 10:01 AM   MCV 86 03/21/2017 10:01 AM       Lab Results  Component Value Date/Time   Hemoglobin A1c 6.5 01/13/2016 09:03 AM   Hemoglobin A1c 6.3 07/13/2015 09:18 AM   Hemoglobin A1c 6.4 01/26/2015 10:21 AM   Glucose 106 03/21/2017 10:01 AM   LDL, calculated 68 02/07/2017 09:21 AM   Creatinine 1.10 03/21/2017 10:01 AM      Lab Results  Component Value Date/Time   Cholesterol, total 139 02/07/2017 09:21 AM   HDL Cholesterol 59 02/07/2017 09:21 AM   LDL, calculated 68 02/07/2017 09:21 AM   Triglyceride 62 02/07/2017 09:21 AM       Lab Results  Component Value Date/Time   GFR est AA 78 03/21/2017 10:01 AM   GFR est non-AA 68 03/21/2017 10:01 AM   Creatinine 1.10 03/21/2017 10:01 AM   BUN 9 03/21/2017 10:01 AM   Sodium 141 03/21/2017 10:01 AM   Potassium 4.5 03/21/2017 10:01 AM   Chloride 97 03/21/2017 10:01 AM   CO2 27 03/21/2017 10:01 AM         Review of Systems   Constitutional: Negative for chills and fever. HENT: Negative for ear pain and nosebleeds. Eyes: Negative for blurred vision, pain and discharge. Respiratory: Negative for shortness of breath. Cardiovascular: Positive for leg swelling. Negative for chest pain. Gastrointestinal: Negative for constipation, diarrhea, nausea and vomiting. Genitourinary: Negative for frequency. Musculoskeletal: Negative for joint pain. Skin: Negative for itching and rash. Neurological: Negative for headaches. Psychiatric/Behavioral: Negative for depression. The patient is not nervous/anxious. Physical Exam   Constitutional: He is oriented to person, place, and time. He appears well-developed and well-nourished. HENT:   Head: Normocephalic and atraumatic. Mouth/Throat: No oropharyngeal exudate. Eyes: Conjunctivae and EOM are normal.   Neck: Normal range of motion. Neck supple.    Cardiovascular: Normal rate, regular rhythm and normal heart sounds. No murmur heard. Pulmonary/Chest: Effort normal and breath sounds normal. No respiratory distress. Abdominal: Soft. Bowel sounds are normal. He exhibits no distension. There is no rebound. Musculoskeletal: He exhibits edema. He exhibits no tenderness. Neurological: He is alert and oriented to person, place, and time. Skin: Skin is warm. No erythema. Psychiatric: He has a normal mood and affect. His behavior is normal.   Nursing note and vitals reviewed. ASSESSMENT and PLAN  Arabella Francis was seen today for results and ankle swelling. Diagnoses and all orders for this visit:    Localized edema  -     bumetanide (BUMEX) 2 mg tablet; Take 1 Tab by mouth daily. Indications: Edema  -     potassium chloride (K-DUR, KLOR-CON) 20 mEq tablet; Take 1 Tab by mouth daily.  -     AMB SUPPLY ORDER    Encounter for immunization  -     bumetanide (BUMEX) 2 mg tablet; Take 1 Tab by mouth daily. Indications: Edema  -     potassium chloride (K-DUR, KLOR-CON) 20 mEq tablet; Take 1 Tab by mouth daily. Screening for glaucoma  -     bumetanide (BUMEX) 2 mg tablet; Take 1 Tab by mouth daily. Indications: Edema  -     potassium chloride (K-DUR, KLOR-CON) 20 mEq tablet;  Take 1 Tab by mouth daily.  -     REFERRAL TO OPHTHALMOLOGY    Essential hypertension  -     aspirin (CHILDREN'S ASPIRIN) 81 mg chewable tablet; TAKE ONE TABLET BY MOUTH EVERY DAY    Pre-diabetes  -     aspirin (CHILDREN'S ASPIRIN) 81 mg chewable tablet; TAKE ONE TABLET BY MOUTH EVERY DAY    Platelets decreased (HCC)  -     aspirin (CHILDREN'S ASPIRIN) 81 mg chewable tablet; TAKE ONE TABLET BY MOUTH EVERY DAY    Leukopenia, unspecified type  -     aspirin (CHILDREN'S ASPIRIN) 81 mg chewable tablet; TAKE ONE TABLET BY MOUTH EVERY DAY    Hypercholesteremia  -     aspirin (CHILDREN'S ASPIRIN) 81 mg chewable tablet; TAKE ONE TABLET BY MOUTH EVERY DAY      Leg elevation at all times or most of the times, support hoses b/l 24hrs per day, ambulation as possible, use of two pillows at nights while in bed

## 2017-05-02 NOTE — PATIENT INSTRUCTIONS
DASH Diet: Care Instructions  Your Care Instructions  The DASH diet is an eating plan that can help lower your blood pressure. DASH stands for Dietary Approaches to Stop Hypertension. Hypertension is high blood pressure. The DASH diet focuses on eating foods that are high in calcium, potassium, and magnesium. These nutrients can lower blood pressure. The foods that are highest in these nutrients are fruits, vegetables, low-fat dairy products, nuts, seeds, and legumes. But taking calcium, potassium, and magnesium supplements instead of eating foods that are high in those nutrients does not have the same effect. The DASH diet also includes whole grains, fish, and poultry. The DASH diet is one of several lifestyle changes your doctor may recommend to lower your high blood pressure. Your doctor may also want you to decrease the amount of sodium in your diet. Lowering sodium while following the DASH diet can lower blood pressure even further than just the DASH diet alone. Follow-up care is a key part of your treatment and safety. Be sure to make and go to all appointments, and call your doctor if you are having problems. It's also a good idea to know your test results and keep a list of the medicines you take. How can you care for yourself at home? Following the DASH diet  · Eat 4 to 5 servings of fruit each day. A serving is 1 medium-sized piece of fruit, ½ cup chopped or canned fruit, 1/4 cup dried fruit, or 4 ounces (½ cup) of fruit juice. Choose fruit more often than fruit juice. · Eat 4 to 5 servings of vegetables each day. A serving is 1 cup of lettuce or raw leafy vegetables, ½ cup of chopped or cooked vegetables, or 4 ounces (½ cup) of vegetable juice. Choose vegetables more often than vegetable juice. · Get 2 to 3 servings of low-fat and fat-free dairy each day. A serving is 8 ounces of milk, 1 cup of yogurt, or 1 ½ ounces of cheese. · Eat 6 to 8 servings of grains each day.  A serving is 1 slice of bread, 1 ounce of dry cereal, or ½ cup of cooked rice, pasta, or cooked cereal. Try to choose whole-grain products as much as possible. · Limit lean meat, poultry, and fish to 2 servings each day. A serving is 3 ounces, about the size of a deck of cards. · Eat 4 to 5 servings of nuts, seeds, and legumes (cooked dried beans, lentils, and split peas) each week. A serving is 1/3 cup of nuts, 2 tablespoons of seeds, or ½ cup of cooked beans or peas. · Limit fats and oils to 2 to 3 servings each day. A serving is 1 teaspoon of vegetable oil or 2 tablespoons of salad dressing. · Limit sweets and added sugars to 5 servings or less a week. A serving is 1 tablespoon jelly or jam, ½ cup sorbet, or 1 cup of lemonade. · Eat less than 2,300 milligrams (mg) of sodium a day. If you limit your sodium to 1,500 mg a day, you can lower your blood pressure even more. Tips for success  · Start small. Do not try to make dramatic changes to your diet all at once. You might feel that you are missing out on your favorite foods and then be more likely to not follow the plan. Make small changes, and stick with them. Once those changes become habit, add a few more changes. · Try some of the following:  ¨ Make it a goal to eat a fruit or vegetable at every meal and at snacks. This will make it easy to get the recommended amount of fruits and vegetables each day. ¨ Try yogurt topped with fruit and nuts for a snack or healthy dessert. ¨ Add lettuce, tomato, cucumber, and onion to sandwiches. ¨ Combine a ready-made pizza crust with low-fat mozzarella cheese and lots of vegetable toppings. Try using tomatoes, squash, spinach, broccoli, carrots, cauliflower, and onions. ¨ Have a variety of cut-up vegetables with a low-fat dip as an appetizer instead of chips and dip. ¨ Sprinkle sunflower seeds or chopped almonds over salads. Or try adding chopped walnuts or almonds to cooked vegetables. ¨ Try some vegetarian meals using beans and peas. Add garbanzo or kidney beans to salads. Make burritos and tacos with mashed parker beans or black beans. Where can you learn more? Go to http://tiffany-clare.info/. Enter Q979 in the search box to learn more about \"DASH Diet: Care Instructions. \"  Current as of: March 23, 2016  Content Version: 11.2  © 7517-1611 [employer]. Care instructions adapted under license by [employer] (which disclaims liability or warranty for this information). If you have questions about a medical condition or this instruction, always ask your healthcare professional. [place] [address] any warranty or liability for your use of this information. Leg and Ankle Edema: Care Instructions  Your Care Instructions  Swelling in the legs, ankles, and feet is called edema. It is common after you sit or stand for a while. Long plane flights or car rides often cause swelling in the legs and feet. You may also have swelling if you have to stand for long periods of time at your job. Problems with the veins in the legs (varicose veins) and changes in hormones can also cause swelling. Sometimes the swelling in the ankles and feet is caused by a more serious problem, such as heart failure, infection, blood clots, or liver or kidney disease. Follow-up care is a key part of your treatment and safety. Be sure to make and go to all appointments, and call your doctor if you are having problems. Its also a good idea to know your test results and keep a list of the medicines you take. How can you care for yourself at home? · If your doctor gave you medicine, take it as prescribed. Call your doctor if you think you are having a problem with your medicine. · Whenever you are resting, raise your legs up. Try to keep the swollen area higher than the level of your heart. · Take breaks from standing or sitting in one position. ¨ Walk around to increase the blood flow in your lower legs.   ¨ Move your feet and ankles often while you stand, or tighten and relax your leg muscles. · Wear support stockings. Put them on in the morning, before swelling gets worse. · Eat a balanced diet. Lose weight if you need to. · Limit the amount of salt (sodium) in your diet. Salt holds fluid in the body and may increase swelling. When should you call for help? Call 911 anytime you think you may need emergency care. For example, call if:  · You have symptoms of a blood clot in your lung (called a pulmonary embolism). These may include:  ¨ Sudden chest pain. ¨ Trouble breathing. ¨ Coughing up blood. Call your doctor now or seek immediate medical care if:  · You have signs of a blood clot, such as:  ¨ Pain in your calf, back of the knee, thigh, or groin. ¨ Redness and swelling in your leg or groin. · You have symptoms of infection, such as:  ¨ Increased pain, swelling, warmth, or redness. ¨ Red streaks or pus. ¨ A fever. Watch closely for changes in your health, and be sure to contact your doctor if:  · Your swelling is getting worse. · You have new or worsening pain in your legs. · You do not get better as expected. Where can you learn more? Go to http://tiffany-clare.info/. Enter J238 in the search box to learn more about \"Leg and Ankle Edema: Care Instructions. \"  Current as of: May 27, 2016  Content Version: 11.2  © 3153-1687 [employer]. Care instructions adapted under license by [employer] (which disclaims liability or warranty for this information). If you have questions about a medical condition or this instruction, always ask your healthcare professional. [place] [address] any warranty or liability for your use of this information.

## 2017-05-02 NOTE — MR AVS SNAPSHOT
Visit Information Date & Time Provider Department Dept. Phone Encounter #  
 5/2/2017 10:00 AM Jasmine Sanchez MD [facility] [phone] [id number] Follow-up Instructions Return in about 3 months (around 8/2/2017), or if symptoms worsen or fail to improve. Upcoming Health Maintenance Date Due  
 GLAUCOMA SCREENING Q2Y 2/9/2012 INFLUENZA AGE 9 TO ADULT 8/1/2017 MEDICARE YEARLY EXAM 8/3/2017 COLON CANCER SCRN (BARIUM / CT COLO / FLX SIG) Q5 2/5/2020 DTaP/Tdap/Td series (2 - Td) 7/8/2024 Allergies as of 5/2/2017  Review Complete On: 5/2/2017 By: Stef Maher LPN No Known Allergies Current Immunizations  Reviewed on 2/7/2017 Name Date Pneumococcal Conjugate (PCV-13) 7/13/2015  9:26 AM  
 Pneumococcal Polysaccharide (PPSV-23) 7/8/2014 Tdap 7/8/2014 Not reviewed this visit You Were Diagnosed With   
  
 Codes Comments Localized edema    -  Primary ICD-10-CM: R60.0 ICD-9-CM: 782.3 Encounter for immunization     ICD-10-CM: F44 ICD-9-CM: V03.89 Screening for glaucoma     ICD-10-CM: Z13.5 ICD-9-CM: V80.1 Essential hypertension     ICD-10-CM: I10 
ICD-9-CM: 401.9 Pre-diabetes     ICD-10-CM: R73.03 
ICD-9-CM: 790.29 Platelets decreased ([facility] [address]     ICD-10-CM: D69.6 ICD-9-CM: 287.5 Leukopenia, unspecified type     ICD-10-CM: D72.819 ICD-9-CM: 288.50 Hypercholesteremia     ICD-10-CM: E78.00 ICD-9-CM: 272.0 Vitals BP Pulse Temp Resp Height(growth percentile) Weight(growth percentile) 120/75 (BP 1 Location: Left arm, BP Patient Position: At rest) 67 98 °F (36.7 °C) (Oral) 14 6' (1.829 m) 208 lb 3.2 oz (94.4 kg) SpO2 BMI Smoking Status 95% 28.24 kg/m2 Never Smoker Vitals History BMI and BSA Data Body Mass Index Body Surface Area  
 28.24 kg/m 2 2.19 m 2 Preferred Pharmacy Pharmacy Name Phone [facility] [address] [place] [address] [phone] Your Updated Medication List  
  
   
This list is accurate as of: 5/2/17 10:12 AM.  Always use your most recent med list.  
  
  
  
  
 aspirin 81 mg chewable tablet Commonly known as:  CHILDREN'S ASPIRIN  
TAKE ONE TABLET BY MOUTH EVERY DAY  
  
 bumetanide 2 mg tablet Commonly known as:  Russel Ana Laura Take 1 Tab by mouth daily. Indications: Edema  
  
 cholecalciferol 1,000 unit Cap Commonly known as:  VITAMIN D3 One tab daily  
  
 cyanocobalamin 2,500 mcg sublingual tablet Commonly known as:  VITAMIN B12  
1 Tab by SubLINGual route daily. lisinopril-hydroCHLOROthiazide 20-25 mg per tablet Commonly known as:  PRINZIDE, ZESTORETIC  
COLLINS 1/2TABLET BY MOUTH EVERY DAY  
  
 potassium chloride 20 mEq tablet Commonly known as:  K-DUR, KLOR-CON Take 1 Tab by mouth daily. Prescriptions Sent to Pharmacy Refills  
 bumetanide (BUMEX) 2 mg tablet 3 Sig: Take 1 Tab by mouth daily. Indications: Edema Class: Normal  
 Pharmacy: [address] Ph #: [phone] Route: Oral  
 potassium chloride (K-DUR, KLOR-CON) 20 mEq tablet 3 Sig: Take 1 Tab by mouth daily. Class: Normal  
 Pharmacy: [address] Ph #: [phone] Route: Oral  
  
We Performed the Following AMB SUPPLY ORDER [[id number] Custom] Comments:  
 Below the knees Bilaterally 20-30 mmhg to be worn daily and off nightly REFERRAL TO OPHTHALMOLOGY [REF57 Custom] Comments:  
 Please evaluate patient for glaucoma screening Follow-up Instructions Return in about 3 months (around 8/2/2017), or if symptoms worsen or fail to improve. Referral Information Referral ID Referred By Referred To  
  
 [id number] Haile Lyn Not Available Visits Status Start Date End Date 1 New Request 5/2/17 5/2/18 If your referral has a status of pending review or denied, additional information will be sent to support the outcome of this decision. Patient Instructions DASH Diet: Care Instructions Your Care Instructions The DASH diet is an eating plan that can help lower your blood pressure. DASH stands for Dietary Approaches to Stop Hypertension. Hypertension is high blood pressure. The DASH diet focuses on eating foods that are high in calcium, potassium, and magnesium. These nutrients can lower blood pressure. The foods that are highest in these nutrients are fruits, vegetables, low-fat dairy products, nuts, seeds, and legumes. But taking calcium, potassium, and magnesium supplements instead of eating foods that are high in those nutrients does not have the same effect. The DASH diet also includes whole grains, fish, and poultry. The DASH diet is one of several lifestyle changes your doctor may recommend to lower your high blood pressure. Your doctor may also want you to decrease the amount of sodium in your diet. Lowering sodium while following the DASH diet can lower blood pressure even further than just the DASH diet alone. Follow-up care is a key part of your treatment and safety. Be sure to make and go to all appointments, and call your doctor if you are having problems. It's also a good idea to know your test results and keep a list of the medicines you take. How can you care for yourself at home? Following the DASH diet · Eat 4 to 5 servings of fruit each day. A serving is 1 medium-sized piece of fruit, ½ cup chopped or canned fruit, 1/4 cup dried fruit, or 4 ounces (½ cup) of fruit juice. Choose fruit more often than fruit juice. · Eat 4 to 5 servings of vegetables each day. A serving is 1 cup of lettuce or raw leafy vegetables, ½ cup of chopped or cooked vegetables, or 4 ounces (½ cup) of vegetable juice. Choose vegetables more often than vegetable juice. · Get 2 to 3 servings of low-fat and fat-free dairy each day. A serving is 8 ounces of milk, 1 cup of yogurt, or 1 ½ ounces of cheese. · Eat 6 to 8 servings of grains each day. A serving is 1 slice of bread, 1 ounce of dry cereal, or ½ cup of cooked rice, pasta, or cooked cereal. Try to choose whole-grain products as much as possible. · Limit lean meat, poultry, and fish to 2 servings each day. A serving is 3 ounces, about the size of a deck of cards. · Eat 4 to 5 servings of nuts, seeds, and legumes (cooked dried beans, lentils, and split peas) each week. A serving is 1/3 cup of nuts, 2 tablespoons of seeds, or ½ cup of cooked beans or peas. · Limit fats and oils to 2 to 3 servings each day. A serving is 1 teaspoon of vegetable oil or 2 tablespoons of salad dressing. · Limit sweets and added sugars to 5 servings or less a week. A serving is 1 tablespoon jelly or jam, ½ cup sorbet, or 1 cup of lemonade. · Eat less than 2,300 milligrams (mg) of sodium a day. If you limit your sodium to 1,500 mg a day, you can lower your blood pressure even more. Tips for success · Start small. Do not try to make dramatic changes to your diet all at once. You might feel that you are missing out on your favorite foods and then be more likely to not follow the plan. Make small changes, and stick with them. Once those changes become habit, add a few more changes. · Try some of the following: ¨ Make it a goal to eat a fruit or vegetable at every meal and at snacks. This will make it easy to get the recommended amount of fruits and vegetables each day. ¨ Try yogurt topped with fruit and nuts for a snack or healthy dessert. ¨ Add lettuce, tomato, cucumber, and onion to sandwiches. ¨ Combine a ready-made pizza crust with low-fat mozzarella cheese and lots of vegetable toppings. Try using tomatoes, squash, spinach, broccoli, carrots, cauliflower, and onions. ¨ Have a variety of cut-up vegetables with a low-fat dip as an appetizer instead of chips and dip. ¨ Sprinkle sunflower seeds or chopped almonds over salads. Or try adding chopped walnuts or almonds to cooked vegetables. ¨ Try some vegetarian meals using beans and peas. Add garbanzo or kidney beans to salads. Make burritos and tacos with mashed parker beans or black beans. Where can you learn more? Go to http://tiffany-clare.info/. Enter V151 in the search box to learn more about \"DASH Diet: Care Instructions. \" Current as of: March 23, 2016 Content Version: 11.2 © 1849-1136 [employer]. Care instructions adapted under license by [employer] (which disclaims liability or warranty for this information). If you have questions about a medical condition or this instruction, always ask your healthcare professional. [place] [address] any warranty or liability for your use of this information. Leg and Ankle Edema: Care Instructions Your Care Instructions Swelling in the legs, ankles, and feet is called edema. It is common after you sit or stand for a while. Long plane flights or car rides often cause swelling in the legs and feet. You may also have swelling if you have to stand for long periods of time at your job. Problems with the veins in the legs (varicose veins) and changes in hormones can also cause swelling. Sometimes the swelling in the ankles and feet is caused by a more serious problem, such as heart failure, infection, blood clots, or liver or kidney disease. Follow-up care is a key part of your treatment and safety. Be sure to make and go to all appointments, and call your doctor if you are having problems. Its also a good idea to know your test results and keep a list of the medicines you take. How can you care for yourself at home? · If your doctor gave you medicine, take it as prescribed.  Call your doctor if you think you are having a problem with your medicine. · Whenever you are resting, raise your legs up. Try to keep the swollen area higher than the level of your heart. · Take breaks from standing or sitting in one position. ¨ Walk around to increase the blood flow in your lower legs. ¨ Move your feet and ankles often while you stand, or tighten and relax your leg muscles. · Wear support stockings. Put them on in the morning, before swelling gets worse. · Eat a balanced diet. Lose weight if you need to. · Limit the amount of salt (sodium) in your diet. Salt holds fluid in the body and may increase swelling. When should you call for help? Call 911 anytime you think you may need emergency care. For example, call if: 
· You have symptoms of a blood clot in your lung (called a pulmonary embolism). These may include: 
¨ Sudden chest pain. ¨ Trouble breathing. ¨ Coughing up blood. Call your doctor now or seek immediate medical care if: 
· You have signs of a blood clot, such as: 
¨ Pain in your calf, back of the knee, thigh, or groin. ¨ Redness and swelling in your leg or groin. · You have symptoms of infection, such as: 
¨ Increased pain, swelling, warmth, or redness. ¨ Red streaks or pus. ¨ A fever. Watch closely for changes in your health, and be sure to contact your doctor if: 
· Your swelling is getting worse. · You have new or worsening pain in your legs. · You do not get better as expected. Where can you learn more? Go to http://tiffany-clare.info/. Enter B997 in the search box to learn more about \"Leg and Ankle Edema: Care Instructions. \" Current as of: May 27, 2016 Content Version: 11.2 © 5093-9615 [employer]. Care instructions adapted under license by [employer] (which disclaims liability or warranty for this information).  If you have questions about a medical condition or this instruction, always ask your healthcare professional. [address] any warranty or liability for your use of this information. Introducing [facility]! Nora Rios introduces OKCoin patient portal. Now you can access parts of your medical record, email your doctor's office, and request medication refills online. 1. In your internet browser, go to [email or web address] 2. Click on the First Time User? Click Here link in the Sign In box. You will see the New Member Sign Up page. 3. Enter your OKCoin Access Code exactly as it appears below. You will not need to use this code after youve completed the sign-up process. If you do not sign up before the expiration date, you must request a new code. · OKCoin Access Code: [email or web address] Expires: 5/8/2017 10:12 AM 
 
4. Enter the last four digits of your Social Security Number (xxxx) and Date of Birth (mm/dd/yyyy) as indicated and click Submit. You will be taken to the next sign-up page. 5. Create a OKCoin ID. This will be your OKCoin login ID and cannot be changed, so think of one that is secure and easy to remember. 6. Create a OKCoin password. You can change your password at any time. 7. Enter your Password Reset Question and Answer. This can be used at a later time if you forget your password. 8. Enter your e-mail address. You will receive e-mail notification when new information is available in [address] 9. Click Sign Up. You can now view and download portions of your medical record. 10. Click the Download Summary menu link to download a portable copy of your medical information. If you have questions, please visit the Frequently Asked Questions section of the OKCoin website. Remember, OKCoin is NOT to be used for urgent needs. For medical emergencies, dial 911. Now available from your iPhone and Android! Please provide this summary of care documentation to your next provider. Your primary care clinician is listed as Socorro Blair. If you have any questions after today's visit, please call [phone].

## 2017-08-02 ENCOUNTER — HOSPITAL ENCOUNTER (OUTPATIENT)
Dept: LAB | Age: 72
Discharge: HOME | End: 2017-08-02
Payer: MEDICARE

## 2017-08-02 ENCOUNTER — OFFICE VISIT (OUTPATIENT)
Dept: FAMILY MEDICINE CLINIC | Age: 72
End: 2017-08-02

## 2017-08-02 VITALS
DIASTOLIC BLOOD PRESSURE: 62 MMHG | OXYGEN SATURATION: 100 % | RESPIRATION RATE: 14 BRPM | SYSTOLIC BLOOD PRESSURE: 107 MMHG | TEMPERATURE: 97.9 F | HEIGHT: 72 IN | WEIGHT: 211 LBS | BODY MASS INDEX: 28.58 KG/M2 | HEART RATE: 71 BPM

## 2017-08-02 DIAGNOSIS — Z13.5 SCREENING FOR GLAUCOMA: Primary | ICD-10-CM

## 2017-08-02 DIAGNOSIS — Z23 ENCOUNTER FOR IMMUNIZATION: ICD-10-CM

## 2017-08-02 DIAGNOSIS — R60.0 LOCALIZED EDEMA: ICD-10-CM

## 2017-08-02 PROCEDURE — 80048 BASIC METABOLIC PNL TOTAL CA: CPT

## 2017-08-02 PROCEDURE — 36415 COLL VENOUS BLD VENIPUNCTURE: CPT

## 2017-08-02 RX ORDER — METOLAZONE 5 MG/1
5 TABLET ORAL DAILY
Qty: 30 TAB | Refills: 2 | Status: SHIPPED | OUTPATIENT
Start: 2017-08-02 | End: 2017-10-25 | Stop reason: SDUPTHER

## 2017-08-02 RX ORDER — POTASSIUM CHLORIDE 20 MEQ/1
20 TABLET, EXTENDED RELEASE ORAL 2 TIMES DAILY
Qty: 60 TAB | Refills: 2 | Status: SHIPPED | OUTPATIENT
Start: 2017-08-02 | End: 2017-09-13 | Stop reason: SDUPTHER

## 2017-08-02 RX ORDER — BUMETANIDE 2 MG/1
2 TABLET ORAL DAILY
Qty: 30 TAB | Refills: 2 | Status: SHIPPED | OUTPATIENT
Start: 2017-08-02 | End: 2017-09-13 | Stop reason: SDUPTHER

## 2017-08-02 NOTE — PROGRESS NOTES
HISTORY OF PRESENT ILLNESS  Shonna Duran is a 81 y.o. male. HPI       HTN  Today pt present for Bp check and the patient stating that so far there has been a Compliancy w/ the bp meds,  he is having had the low salt diet,  the patient has been active    pt states that patien does obtain the bp at home few times a week and the average of  Patient present with b/lSwelling of the lower ext,   Stating that he does take his fluid pills since last visit,  Since May he only took his Bumex for 4 weeks and did not get it refilled  Today the patient denies leg pain, denies rash, and legs lesion,   in addition the pt stated that denial of dizziness,    the legs B swelling not only not better but also the wt went up by close to 3 pounds,   Vitals 8/2/2017 5/2/2017   Weight 211 lb 208 lb 3.2 oz     today the pt denies Chest Pain,   no lightheadedness,  otherwise feeling better since the last visit  Only meds he is taking is his bp meds, asa, vit-d and b12 daily for the last 2 months    Current Outpatient Prescriptions   Medication Sig Dispense Refill    bumetanide (BUMEX) 2 mg tablet Take 1 Tab by mouth daily. Indications: Edema 30 Tab 3    potassium chloride (K-DUR, KLOR-CON) 20 mEq tablet Take 1 Tab by mouth daily. 30 Tab 3    aspirin (CHILDREN'S ASPIRIN) 81 mg chewable tablet TAKE ONE TABLET BY MOUTH EVERY DAY 30 Tab 11    cholecalciferol (VITAMIN D3) 1,000 unit cap One tab daily 30 Cap 11    lisinopril-hydroCHLOROthiazide (PRINZIDE, ZESTORETIC) 20-25 mg per tablet COLLINS 1/2TABLET BY MOUTH EVERY DAY 90 Tab 3    cyanocobalamin 2,500 mcg Subl 1 Tab by SubLINGual route daily.  30 Tab 6     No Known Allergies  Past Medical History:   Diagnosis Date    Elevated PSA, less than 10 ng/ml 12/10/2012    Hypertension     Hypokalemia 7/25/2014    Localized edema 2/7/2017    Vitamin D deficiency 7/8/2014    WBC decreased 7/6/2012     Past Surgical History:   Procedure Laterality Date    COLONOSCOPY,DIAGNOSTIC  2/5/2015        Abhi Dunaway HX ORTHOPAEDIC      left knee surg in  1984     Family History   Problem Relation Age of Onset    Kidney Disease Father      Social History   Substance Use Topics    Smoking status: Never Smoker    Smokeless tobacco: Never Used    Alcohol use No      Lab Results  Component Value Date/Time   WBC 3.8 03/21/2017 10:01 AM   HGB 14.3 03/21/2017 10:01 AM   HCT 42.2 03/21/2017 10:01 AM   PLATELET 368 13/61/7522 10:01 AM   MCV 86 03/21/2017 10:01 AM     Lab Results  Component Value Date/Time   Hemoglobin A1c 6.5 01/13/2016 09:03 AM   Hemoglobin A1c 6.3 07/13/2015 09:18 AM   Hemoglobin A1c 6.4 01/26/2015 10:21 AM   Glucose 106 03/21/2017 10:01 AM   LDL, calculated 68 02/07/2017 09:21 AM   Creatinine 1.10 03/21/2017 10:01 AM      Lab Results  Component Value Date/Time   Cholesterol, total 139 02/07/2017 09:21 AM   HDL Cholesterol 59 02/07/2017 09:21 AM   LDL, calculated 68 02/07/2017 09:21 AM   Triglyceride 62 02/07/2017 09:21 AM   Lab Results  Component Value Date/Time   GFR est non-AA 68 03/21/2017 10:01 AM   GFR est AA 78 03/21/2017 10:01 AM   Creatinine 1.10 03/21/2017 10:01 AM   BUN 9 03/21/2017 10:01 AM   Sodium 141 03/21/2017 10:01 AM   Potassium 4.5 03/21/2017 10:01 AM   Chloride 97 03/21/2017 10:01 AM   CO2 27 03/21/2017 10:01 AM   Magnesium 2.1 01/13/2016 09:03 AM   Lab Results  Component Value Date/Time   TSH 2.180 02/07/2017 09:21 AM   T4, Free 0.86 04/23/2012 09:30 AM      Lab Results   Component Value Date/Time    Glucose 106 03/21/2017 10:01 AM                     Review of Systems   Constitutional: Negative for chills and fever. HENT: Negative for ear pain and nosebleeds. Eyes: Negative for blurred vision, pain and discharge. Respiratory: Negative for shortness of breath. Cardiovascular: Negative for chest pain and leg swelling. Gastrointestinal: Negative for constipation, diarrhea, nausea and vomiting. Genitourinary: Negative for frequency. Musculoskeletal: Negative for joint pain.    Skin: Negative for itching and rash. Neurological: Negative for headaches. Psychiatric/Behavioral: Negative for depression. The patient is not nervous/anxious. Physical Exam   Constitutional: He is oriented to person, place, and time. He appears well-developed and well-nourished. HENT:   Head: Normocephalic and atraumatic. Mouth/Throat: No oropharyngeal exudate. Eyes: Conjunctivae and EOM are normal.   Neck: Normal range of motion. Neck supple. Cardiovascular: Normal rate, regular rhythm and normal heart sounds. No murmur heard. Pulmonary/Chest: Effort normal and breath sounds normal. No respiratory distress. Abdominal: Soft. Bowel sounds are normal. He exhibits no distension. There is no rebound. Musculoskeletal: He exhibits no edema or tenderness. Neurological: He is alert and oriented to person, place, and time. Skin: Skin is warm. No erythema. Psychiatric: He has a normal mood and affect. His behavior is normal.   Nursing note and vitals reviewed. ASSESSMENT and PLAN  Diagnoses and all orders for this visit:    1. Screening for glaucoma  -     REFERRAL TO OPTOMETRY  -     bumetanide (BUMEX) 2 mg tablet; Take 1 Tab by mouth daily. Indications: Edema  -     potassium chloride (K-DUR, KLOR-CON) 20 mEq tablet; Take 1 Tab by mouth two (2) times a day. Indications: hypokalemia prevention  -     METABOLIC PANEL, BASIC    2. Encounter for immunization  -     bumetanide (BUMEX) 2 mg tablet; Take 1 Tab by mouth daily. Indications: Edema  -     potassium chloride (K-DUR, KLOR-CON) 20 mEq tablet; Take 1 Tab by mouth two (2) times a day. Indications: hypokalemia prevention  -     METABOLIC PANEL, BASIC    3. Localized edema  -     bumetanide (BUMEX) 2 mg tablet; Take 1 Tab by mouth daily. Indications: Edema  -     potassium chloride (K-DUR, KLOR-CON) 20 mEq tablet; Take 1 Tab by mouth two (2) times a day.  Indications: hypokalemia prevention  -     METABOLIC PANEL, BASIC    Other orders  - metOLazone (ZAROXOLYN) 5 mg tablet; Take 1 Tab by mouth daily.     Leg elevation at all times or most of the times, support hoses b/l 24hrs per day, ambulation as possible, use of two pillows at nights while in bed

## 2017-08-02 NOTE — MR AVS SNAPSHOT
Visit Information Date & Time Provider Department Dept. Phone Encounter #  
 8/2/2017  9:30 AM Mellissa Granger MD [address] [facility] [phone] [id number] Follow-up Instructions Return in about 6 weeks (around 9/13/2017), or if symptoms worsen or fail to improve. Upcoming Health Maintenance Date Due  
 GLAUCOMA SCREENING Q2Y 2/9/2012 MEDICARE YEARLY EXAM 8/3/2017 COLON CANCER SCRN (BARIUM / CT COLO / FLX SIG) Q5 2/5/2020 DTaP/Tdap/Td series (2 - Td) 7/8/2024 Allergies as of 8/2/2017  Review Complete On: 8/2/2017 By: Natalya Brown LPN No Known Allergies Current Immunizations  Reviewed on 2/7/2017 Name Date Pneumococcal Conjugate (PCV-13) 7/13/2015  9:26 AM  
 Pneumococcal Polysaccharide (PPSV-23) 7/8/2014 Tdap 7/8/2014 Not reviewed this visit You Were Diagnosed With   
  
 Codes Comments Screening for glaucoma    -  Primary ICD-10-CM: Z13.5 ICD-9-CM: V80.1 Encounter for immunization     ICD-10-CM: U80 ICD-9-CM: V03.89 Localized edema     ICD-10-CM: R60.0 ICD-9-CM: 625. 3 Vitals BP Pulse Temp Resp Height(growth percentile) Weight(growth percentile) 107/62 (BP 1 Location: Left arm, BP Patient Position: At rest) 71 97.9 °F (36.6 °C) (Oral) 14 6' (1.829 m) 211 lb (95.7 kg) SpO2 BMI Smoking Status 100% 28.62 kg/m2 Never Smoker Vitals History BMI and BSA Data Body Mass Index Body Surface Area  
 28.62 kg/m 2 2.2 m 2 Preferred Pharmacy Pharmacy Name Phone [facility] [address], [address] [place] [address] Avenue [phone] Your Updated Medication List  
  
   
This list is accurate as of: 8/2/17 10:39 AM.  Always use your most recent med list.  
  
  
  
  
 aspirin 81 mg chewable tablet Commonly known as:  CHILDREN'S ASPIRIN  
TAKE ONE TABLET BY MOUTH EVERY DAY  
  
 bumetanide 2 mg tablet Commonly known as:  Ole Gerson Take 1 Tab by mouth daily. Indications: Edema  
  
 cholecalciferol 1,000 unit Cap Commonly known as:  VITAMIN D3 One tab daily  
  
 cyanocobalamin 2,500 mcg sublingual tablet Commonly known as:  VITAMIN B12  
1 Tab by SubLINGual route daily. metOLazone 5 mg tablet Commonly known as:  Yonatan Parisian Take 1 Tab by mouth daily. potassium chloride 20 mEq tablet Commonly known as:  K-DUR, KLOR-CON Take 1 Tab by mouth two (2) times a day. Indications: hypokalemia prevention Prescriptions Printed Refills  
 bumetanide (BUMEX) 2 mg tablet 2 Sig: Take 1 Tab by mouth daily. Indications: Edema Class: Print Route: Oral  
 potassium chloride (K-DUR, KLOR-CON) 20 mEq tablet 2 Sig: Take 1 Tab by mouth two (2) times a day. Indications: hypokalemia prevention Class: Print Route: Oral  
 metOLazone (ZAROXOLYN) 5 mg tablet 2 Sig: Take 1 Tab by mouth daily. Class: Print Route: Oral  
  
We Performed the Following METABOLIC PANEL, BASIC [84103 CPT(R)] REFERRAL TO OPTOMETRY [id number] Custom] Comments:  
 Please evaluate patient for glaucoma. Follow-up Instructions Return in about 6 weeks (around 9/13/2017), or if symptoms worsen or fail to improve. Referral Information Referral ID Referred By Referred To  
  
 [id number] RIGOBERTO BLACKWELL MD   
   [address], [address] [address] Phone: [phone] Fax: [phone] Visits Status Start Date End Date 1 New Request 8/2/17 8/2/18 If your referral has a status of pending review or denied, additional information will be sent to support the outcome of this decision. Patient Instructions High Blood Pressure: Care Instructions Your Care Instructions If your blood pressure is usually above 140/90, you have high blood pressure, or hypertension.  That means the top number is 140 or higher or the bottom number is 90 or higher, or both. Despite what a lot of people think, high blood pressure usually doesn't cause headaches or make you feel dizzy or lightheaded. It usually has no symptoms. But it does increase your risk for heart attack, stroke, and kidney or eye damage. The higher your blood pressure, the more your risk increases. Your doctor will give you a goal for your blood pressure. Your goal will be based on your health and your age. An example of a goal is to keep your blood pressure below 140/90. Lifestyle changes, such as eating healthy and being active, are always important to help lower blood pressure. You might also take medicine to reach your blood pressure goal. 
Follow-up care is a key part of your treatment and safety. Be sure to make and go to all appointments, and call your doctor if you are having problems. It's also a good idea to know your test results and keep a list of the medicines you take. How can you care for yourself at home? Medical treatment · If you stop taking your medicine, your blood pressure will go back up. You may take one or more types of medicine to lower your blood pressure. Be safe with medicines. Take your medicine exactly as prescribed. Call your doctor if you think you are having a problem with your medicine. · Talk to your doctor before you start taking aspirin every day. Aspirin can help certain people lower their risk of a heart attack or stroke. But taking aspirin isn't right for everyone, because it can cause serious bleeding. · See your doctor regularly. You may need to see the doctor more often at first or until your blood pressure comes down. · If you are taking blood pressure medicine, talk to your doctor before you take decongestants or anti-inflammatory medicine, such as ibuprofen. Some of these medicines can raise blood pressure. · Learn how to check your blood pressure at home. Lifestyle changes · Stay at a healthy weight. This is especially important if you put on weight around the waist. Losing even 10 pounds can help you lower your blood pressure. · If your doctor recommends it, get more exercise. Walking is a good choice. Bit by bit, increase the amount you walk every day. Try for at least 30 minutes on most days of the week. You also may want to swim, bike, or do other activities. · Avoid or limit alcohol. Talk to your doctor about whether you can drink any alcohol. · Try to limit how much sodium you eat to less than 2,300 milligrams (mg) a day. Your doctor may ask you to try to eat less than 1,500 mg a day. · Eat plenty of fruits (such as bananas and oranges), vegetables, legumes, whole grains, and low-fat dairy products. · Lower the amount of saturated fat in your diet. Saturated fat is found in animal products such as milk, cheese, and meat. Limiting these foods may help you lose weight and also lower your risk for heart disease. · Do not smoke. Smoking increases your risk for heart attack and stroke. If you need help quitting, talk to your doctor about stop-smoking programs and medicines. These can increase your chances of quitting for good. When should you call for help? Call 911 anytime you think you may need emergency care. This may mean having symptoms that suggest that your blood pressure is causing a serious heart or blood vessel problem. Your blood pressure may be over 180/110. For example, call 911 if: 
· You have symptoms of a heart attack. These may include: ¨ Chest pain or pressure, or a strange feeling in the chest. 
¨ Sweating. ¨ Shortness of breath. ¨ Nausea or vomiting. ¨ Pain, pressure, or a strange feeling in the back, neck, jaw, or upper belly or in one or both shoulders or arms. ¨ Lightheadedness or sudden weakness. ¨ A fast or irregular heartbeat. · You have symptoms of a stroke. These may include: ¨ Sudden numbness, tingling, weakness, or loss of movement in your face, arm, or leg, especially on only one side of your body. ¨ Sudden vision changes. ¨ Sudden trouble speaking. ¨ Sudden confusion or trouble understanding simple statements. ¨ Sudden problems with walking or balance. ¨ A sudden, severe headache that is different from past headaches. · You have severe back or belly pain. Do not wait until your blood pressure comes down on its own. Get help right away. Call your doctor now or seek immediate care if: 
· Your blood pressure is much higher than normal (such as 180/110 or higher), but you don't have symptoms. · You think high blood pressure is causing symptoms, such as: ¨ Severe headache. ¨ Blurry vision. Watch closely for changes in your health, and be sure to contact your doctor if: 
· Your blood pressure measures 140/90 or higher at least 2 times. That means the top number is 140 or higher or the bottom number is 90 or higher, or both. · You think you may be having side effects from your blood pressure medicine. · Your blood pressure is usually normal, but it goes above normal at least 2 times. Where can you learn more? Go to http://tiffany-clare.info/. Enter Q529 in the search box to learn more about \"High Blood Pressure: Care Instructions. \" Current as of: August 8, 2016 Content Version: 11.3 © 8404-4386 [employer]. Care instructions adapted under license by [employer] (which disclaims liability or warranty for this information). If you have questions about a medical condition or this instruction, always ask your healthcare professional. [place] [address] any warranty or liability for your use of this information. Introducing [facility]! [facility] introduces CircuLite patient portal. Now you can access parts of your medical record, email your doctor's office, and request medication refills online. 1. In your internet browser, go to [email or web address] 2. Click on the First Time User? Click Here link in the Sign In box. You will see the New Member Sign Up page. 3. Enter your MedSolutions Access Code exactly as it appears below. You will not need to use this code after youve completed the sign-up process. If you do not sign up before the expiration date, you must request a new code. · MedSolutions Access Code: [email or web address] Expires: 10/31/2017 10:39 AM 
 
4. Enter the last four digits of your Social Security Number (xxxx) and Date of Birth (mm/dd/yyyy) as indicated and click Submit. You will be taken to the next sign-up page. 5. Create a MedSolutions ID. This will be your MedSolutions login ID and cannot be changed, so think of one that is secure and easy to remember. 6. Create a MedSolutions password. You can change your password at any time. 7. Enter your Password Reset Question and Answer. This can be used at a later time if you forget your password. 8. Enter your e-mail address. You will receive e-mail notification when new information is available in [address] 9. Click Sign Up. You can now view and download portions of your medical record. 10. Click the Download Summary menu link to download a portable copy of your medical information. If you have questions, please visit the Frequently Asked Questions section of the MedSolutions website. Remember, MedSolutions is NOT to be used for urgent needs. For medical emergencies, dial 911. Now available from your iPhone and Android! Please provide this summary of care documentation to your next provider. Your primary care clinician is listed as Paulie Ayala. If you have any questions after today's visit, please call [phone].

## 2017-08-02 NOTE — PATIENT INSTRUCTIONS

## 2017-08-02 NOTE — PROGRESS NOTES
Kanwal Antoine        Name and  verified        Chief Complaint   Patient presents with    Hypertension     3 month f/u    Vitamin D Deficiency     3 month f/u         Health Maintenance reviewed-discussed with patient.

## 2017-08-03 LAB
BUN SERPL-MCNC: 10 MG/DL (ref 8–27)
BUN/CREAT SERPL: 8 (ref 10–24)
CALCIUM SERPL-MCNC: 9.6 MG/DL (ref 8.6–10.2)
CHLORIDE SERPL-SCNC: 101 MMOL/L (ref 96–106)
CO2 SERPL-SCNC: 28 MMOL/L (ref 18–29)
CREAT SERPL-MCNC: 1.18 MG/DL (ref 0.76–1.27)
GLUCOSE SERPL-MCNC: 115 MG/DL (ref 65–99)
POTASSIUM SERPL-SCNC: 4.4 MMOL/L (ref 3.5–5.2)
SODIUM SERPL-SCNC: 142 MMOL/L (ref 134–144)

## 2017-09-13 ENCOUNTER — OFFICE VISIT (OUTPATIENT)
Dept: FAMILY MEDICINE CLINIC | Age: 72
End: 2017-09-13

## 2017-09-13 ENCOUNTER — HOSPITAL ENCOUNTER (OUTPATIENT)
Dept: LAB | Age: 72
Discharge: HOME | End: 2017-09-13
Payer: MEDICARE

## 2017-09-13 VITALS
SYSTOLIC BLOOD PRESSURE: 129 MMHG | HEIGHT: 72 IN | OXYGEN SATURATION: 96 % | WEIGHT: 225.4 LBS | RESPIRATION RATE: 14 BRPM | DIASTOLIC BLOOD PRESSURE: 68 MMHG | BODY MASS INDEX: 30.53 KG/M2 | HEART RATE: 62 BPM | TEMPERATURE: 97.4 F

## 2017-09-13 DIAGNOSIS — Z13.5 SCREENING FOR GLAUCOMA: ICD-10-CM

## 2017-09-13 DIAGNOSIS — E55.9 VITAMIN D DEFICIENCY: ICD-10-CM

## 2017-09-13 DIAGNOSIS — Z23 ENCOUNTER FOR IMMUNIZATION: ICD-10-CM

## 2017-09-13 DIAGNOSIS — I10 ESSENTIAL HYPERTENSION: ICD-10-CM

## 2017-09-13 DIAGNOSIS — Z13.39 SCREENING FOR ALCOHOLISM: ICD-10-CM

## 2017-09-13 DIAGNOSIS — R60.0 LOCALIZED EDEMA: Primary | ICD-10-CM

## 2017-09-13 DIAGNOSIS — Z13.31 SCREENING FOR DEPRESSION: ICD-10-CM

## 2017-09-13 DIAGNOSIS — Z12.11 SCREEN FOR COLON CANCER: ICD-10-CM

## 2017-09-13 PROCEDURE — 36415 COLL VENOUS BLD VENIPUNCTURE: CPT

## 2017-09-13 PROCEDURE — 80053 COMPREHEN METABOLIC PANEL: CPT

## 2017-09-13 RX ORDER — POTASSIUM CHLORIDE 20 MEQ/1
20 TABLET, EXTENDED RELEASE ORAL 2 TIMES DAILY
Qty: 60 TAB | Refills: 2 | Status: SHIPPED | OUTPATIENT
Start: 2017-09-13 | End: 2017-10-25 | Stop reason: SDUPTHER

## 2017-09-13 RX ORDER — BUMETANIDE 2 MG/1
2 TABLET ORAL 2 TIMES DAILY
Qty: 60 TAB | Refills: 2 | Status: SHIPPED | OUTPATIENT
Start: 2017-09-13 | End: 2017-10-25 | Stop reason: SDUPTHER

## 2017-09-13 NOTE — PROGRESS NOTES
Kendell Giron        Name and  verified      Chief Complaint   Patient presents with    Results     (6) weeks f/u         Health Maintenance reviewed-discussed with patient.

## 2017-09-13 NOTE — PROGRESS NOTES
This is a Subsequent Medicare Annual Wellness Exam (AWV) (Performed 12 months after IPPE or effective date of Medicare Part B enrollment, Once in a lifetime)    I have reviewed the patient's medical history in detail and updated the computerized patient record.      History     Present for CPE, last Complete Physical exam was few yrs ago,  Up todate w/ all vaccination, last tetanus vaccine was in <6yrs ago  .         Last psa exam was normal and was in last yr      last colonoscopy was normal and was in ,   No past surgical hx,  last bone dexa scan was never      No family hx of breast cancer in a male,  no family hx of prostate cancer   no family hx of colon cancer, father  of old age mother still livinghealthy, +++sexaully active and uses [employer], +++physically active,  compliant w/ meds, ++Rf needed for today for her meds.        Patient present with b/lSwelling of the lower ext,   Stating that he ran out of his meds couple weeks ago, and now his legs are extremly swelled up, he works physical job, and he does a lot of walking but unfortunately walking has not been helping the swelling to go away, on the pt last visits, he did not have much of the legs swelling and his weight was also better than today's weight,  Today the patient denies leg pain, denies rash, and legs lesion, in addition the pt stated that denial of dizziness,  the legs B swelling not only not better but also the wt went up by close to >10 pounds, 3++++, no hx of echo in the past, none smoker, no trouble with the liver, no etoh abuse,        Vitals 2017 2017 2017 3/21/2017 2017   Weight 225 lb 6.4 oz 211 lb 208 lb 3.2 oz 210 lb 12.8 oz 203 lb 9.6 oz           Past Medical History:   Diagnosis Date    Elevated PSA, less than 10 ng/ml 12/10/2012    Hypertension     Hypokalemia 2014    Localized edema 2017    Vitamin D deficiency 2014    WBC decreased 2012      Past Surgical History:   Procedure Laterality Date    COLONOSCOPY,DIAGNOSTIC  2/5/2015         HX ORTHOPAEDIC      left knee surg in  1984     Current Outpatient Prescriptions   Medication Sig Dispense Refill    bumetanide (BUMEX) 2 mg tablet Take 1 Tab by mouth daily. Indications: Edema 30 Tab 2    potassium chloride (K-DUR, KLOR-CON) 20 mEq tablet Take 1 Tab by mouth two (2) times a day. Indications: hypokalemia prevention 60 Tab 2    metOLazone (ZAROXOLYN) 5 mg tablet Take 1 Tab by mouth daily. 30 Tab 2    aspirin (CHILDREN'S ASPIRIN) 81 mg chewable tablet TAKE ONE TABLET BY MOUTH EVERY DAY 30 Tab 11    cholecalciferol (VITAMIN D3) 1,000 unit cap One tab daily 30 Cap 11    cyanocobalamin 2,500 mcg Subl 1 Tab by SubLINGual route daily. 30 Tab 6     No Known Allergies  Family History   Problem Relation Age of Onset    Kidney Disease Father      Social History   Substance Use Topics    Smoking status: Never Smoker    Smokeless tobacco: Never Used    Alcohol use No     Patient Active Problem List   Diagnosis Code    HTN (hypertension) I10    B12 deficiency E53.8    Pre-diabetes R73.03    WBC decreased D72.819    Screening for colon cancer Z12.11    Elevated PSA, less than 10 ng/ml R97.20    Hypercholesteremia E78.00    Platelets decreased ([address] D69.6    Vitamin D deficiency E55.9    Hypokalemia E87.6    Localized edema R60.0       Depression Risk Factor Screening:     PHQ over the last two weeks 9/13/2017   Little interest or pleasure in doing things Not at all   Feeling down, depressed or hopeless Not at all   Total Score PHQ 2 0     Alcohol Risk Factor Screening: You do not drink alcohol or very rarely. Functional Ability and Level of Safety:   Hearing Loss  Hearing is good. Activities of Daily Living  The home contains: no safety equipment  Patient does total self care    Fall RiskFall Risk Assessment, last 12 mths 9/13/2017   Able to walk? Yes   Fall in past 12 months?  No       Abuse Screen  Patient is not abused    Cognitive Screening   Evaluation of Cognitive Function:  Has your family/caregiver stated any concerns about your memory: no  Normal    Patient Care Team   Patient Care Team:  Magdalene Wylie MD as PCP - General (Family Practice)    Assessment/Plan   Education and counseling provided:      Are appropriate based on today's review and evaluation  End-of-Life planning (with patient's consent): Initial ACP discussion, pt wants the mother [phone] as SDM,  Pneumococcal Vaccuptodateine, uptodate  Influenza Vaccine,   Hepatitis B Vaccine, declined  Prostate cancer screening tests not indicated  Colorectal cancer screening tests, FIT ordered today  Cardiovascular screening blood test, addressed  Bone mass measurement (DEXA), not ordered await for approval  Screening for glaucoma, done  Diabetes screening test, done today, no hx of it       Diagnoses and all orders for this visit:    1. Localized edema  -     bumetanide (BUMEX) 2 mg tablet; Take 1 Tab by mouth two (2) times a day. Indications: Edema  -     potassium chloride (K-DUR, KLOR-CON) 20 mEq tablet; Take 1 Tab by mouth two (2) times a day. Indications: hypokalemia prevention  -     ECHO COMPLETE STUDY  -     METABOLIC PANEL, COMPREHENSIVE    2. Screening for glaucoma  -     REFERRAL TO OPTOMETRY  -     bumetanide (BUMEX) 2 mg tablet; Take 1 Tab by mouth two (2) times a day. Indications: Edema  -     potassium chloride (K-DUR, KLOR-CON) 20 mEq tablet; Take 1 Tab by mouth two (2) times a day. Indications: hypokalemia prevention  -     METABOLIC PANEL, COMPREHENSIVE    3. Screening for alcoholism  -     Annual  Alcohol Screen 15 min ()  -     METABOLIC PANEL, COMPREHENSIVE    4. Screening for depression  -     Depression Screen Annual  -     METABOLIC PANEL, COMPREHENSIVE    5. Screen for colon cancer  -     OCCULT BLOOD, IMMUNOASSAY (FIT) (81923)  -     METABOLIC PANEL, COMPREHENSIVE    6.  Essential hypertension  -     ECHO COMPLETE STUDY  - METABOLIC PANEL, COMPREHENSIVE    7. Vitamin D deficiency  -     METABOLIC PANEL, COMPREHENSIVE    8. Encounter for immunization  -     bumetanide (BUMEX) 2 mg tablet; Take 1 Tab by mouth two (2) times a day. Indications: Edema  -     potassium chloride (K-DUR, KLOR-CON) 20 mEq tablet; Take 1 Tab by mouth two (2) times a day.  Indications: hypokalemia prevention  -     METABOLIC PANEL, COMPREHENSIVE        Health Maintenance Due   Topic Date Due    GLAUCOMA SCREENING Q2Y  02/09/2012    MEDICARE YEARLY EXAM  08/03/2017       Leg elevation at all times or most of the times, support hoses b/l 24hrs per day, ambulation as possible, use of two pillows at nights while in bed, pt agreedd, reevalute in 6 weeks for kft and the response to the current meds

## 2017-09-13 NOTE — MR AVS SNAPSHOT
Visit Information Date & Time Provider Department Dept. Phone Encounter #  
 9/13/2017 10:45 AM Mode Flores MD [address] OFF[facility] [phone] [id number] Follow-up Instructions Return in about 6 weeks (around 10/25/2017), or if symptoms worsen or fail to improve. Upcoming Health Maintenance Date Due  
 GLAUCOMA SCREENING Q2Y 2/9/2012 MEDICARE YEARLY EXAM 8/3/2017 COLON CANCER SCRN (BARIUM / CT COLO / FLX SIG) Q5 2/5/2020 DTaP/Tdap/Td series (2 - Td) 7/8/2024 Allergies as of 9/13/2017  Review Complete On: 9/13/2017 By: Cynthia Ewing LPN No Known Allergies Current Immunizations  Reviewed on 2/7/2017 Name Date Pneumococcal Conjugate (PCV-13) 7/13/2015  9:26 AM  
 Pneumococcal Polysaccharide (PPSV-23) 7/8/2014 Tdap 7/8/2014 Not reviewed this visit You Were Diagnosed With   
  
 Codes Comments Localized edema    -  Primary ICD-10-CM: R60.0 ICD-9-CM: 266. 3 Screening for glaucoma     ICD-10-CM: Z13.5 ICD-9-CM: V80.1 Screening for alcoholism     ICD-10-CM: Z13.89 ICD-9-CM: V79.1 Screening for depression     ICD-10-CM: Z13.89 ICD-9-CM: V79.0 Screen for colon cancer     ICD-10-CM: Z12.11 ICD-9-CM: V76.51 Essential hypertension     ICD-10-CM: I10 
ICD-9-CM: 401.9 Vitamin D deficiency     ICD-10-CM: E55.9 ICD-9-CM: 268.9 Encounter for immunization     ICD-10-CM: M13 ICD-9-CM: V03.89 Vitals BP Pulse Temp Resp Height(growth percentile) Weight(growth percentile) 129/68 (BP 1 Location: Left arm, BP Patient Position: At rest) 62 97.4 °F (36.3 °C) (Oral) 14 6' (1.829 m) 225 lb 6.4 oz (102.2 kg) SpO2 BMI Smoking Status 96% 30.57 kg/m2 Never Smoker Vitals History BMI and BSA Data Body Mass Index Body Surface Area 30.57 kg/m 2 2.28 m 2 Preferred Pharmacy Pharmacy Name Phone  [facility] - [address] [phone] Your Updated Medication List  
  
   
This list is accurate as of: 9/13/17 11:49 AM.  Always use your most recent med list.  
  
  
  
  
 aspirin 81 mg chewable tablet Commonly known as:  CHILDREN'S ASPIRIN  
TAKE ONE TABLET BY MOUTH EVERY DAY  
  
 bumetanide 2 mg tablet Commonly known as:  Ramon Dilling Take 1 Tab by mouth two (2) times a day. Indications: Edema  
  
 cholecalciferol 1,000 unit Cap Commonly known as:  VITAMIN D3 One tab daily  
  
 cyanocobalamin 2,500 mcg sublingual tablet Commonly known as:  VITAMIN B12  
1 Tab by SubLINGual route daily. metOLazone 5 mg tablet Commonly known as:  Abel Baller Take 1 Tab by mouth daily. potassium chloride 20 mEq tablet Commonly known as:  K-DUR, KLOR-CON Take 1 Tab by mouth two (2) times a day. Indications: hypokalemia prevention Prescriptions Printed Refills  
 bumetanide (BUMEX) 2 mg tablet 2 Sig: Take 1 Tab by mouth two (2) times a day. Indications: Edema Class: Print Route: Oral  
 potassium chloride (K-DUR, KLOR-CON) 20 mEq tablet 2 Sig: Take 1 Tab by mouth two (2) times a day. Indications: hypokalemia prevention Class: Print Route: Oral  
  
We Performed the Following [facility] [[id number] [facility]] ECHO COMPLETE STUDY [51134 CPT(R)] METABOLIC PANEL, COMPREHENSIVE [89188 CPT(R)] OCCULT BLOOD, IMMUNOASSAY (FIT) [id number] CPT(R)] [place] ANNUAL ALCOHOL SCREEN 15 MIN [id number] [facility]] REFERRAL TO OPTOMETRY [id number] Custom] Comments:  
 Please evaluate patient for glaucoma. Follow-up Instructions Return in about 6 weeks (around 10/25/2017), or if symptoms worsen or fail to improve. Referral Information Referral ID Referred By Referred To  
  
 [id number] RIGOBERTO BLACKWELL MD   
   [address], [address] Phone: [phone] Fax: [phone] Visits Status Start Date End Date 1 New Request 9/13/17 9/13/18 If your referral has a status of pending review or denied, additional information will be sent to support the outcome of this decision. Patient Instructions Medicare Wellness Visit, Male The best way to live healthy is to have a healthy lifestyle by eating a well-balanced diet, exercising regularly, limiting alcohol and stopping smoking. Regular physical exams and screening tests are another way to keep healthy. Preventive exams provided by your health care provider can find health problems before they become diseases or illnesses. Preventive services including immunizations, screening tests, monitoring and exams can help you take care of your own health. All people over age 72 should have a pneumovax  and and a prevnar shot to prevent pneumonia. These are once in a lifetime unless you and your provider decide differently. All people over 65 should have a yearly flu shot and a tetanus vaccine every 10 years. Screening for diabetes mellitus with a blood sugar test should be done every year. Glaucoma is a disease of the eye due to increased ocular pressure that can lead to blindness and it should be done every year by an eye professional. 
 
Cardiovascular screening tests that check for elevated lipids (fatty part of blood) which can lead to heart disease and strokes should be done every 5 years. Colorectal screening that evaluates for blood or polyps in your colon should be done yearly as a stool test or every five years as a flexible sigmoidoscope or every 10 years as a colonoscopy up to age 76. Men up to age 76 may need a screening blood test for prostate cancer at certain intervals, depending on their personal and family history. This decision is between the patient and his provider. If you have been a smoker or had family history of abdominal aortic aneurysms, you and your provider may decide to schedule an ultrasound test of your aorta. Hepatitis C screening is also recommended for anyone born between [birth year] through [address]. A shingles vaccine is also recommended once in a lifetime after age 61. Your Medicare Wellness Exam is recommended annually. Here is a list of your current Health Maintenance items with a due date: 
Health Maintenance Due Topic Date Due  Glaucoma Screening   02/09/2012 Tanvir Cooley Annual Well Visit  08/03/2017 High Cholesterol: Care Instructions Your Care Instructions Cholesterol is a type of fat in your blood. It is needed for many body functions, such as making new cells. Cholesterol is made by your body. It also comes from food you eat. High cholesterol means that you have too much of the fat in your blood. This raises your risk of a heart attack and stroke. LDL and HDL are part of your total cholesterol. LDL is the \"bad\" cholesterol. High LDL can raise your risk for heart disease, heart attack, and stroke. HDL is the \"good\" cholesterol. It helps clear bad cholesterol from the body. High HDL is linked with a lower risk of heart disease, heart attack, and stroke. Your cholesterol levels help your doctor find out your risk for having a heart attack or stroke. You and your doctor can talk about whether you need to lower your risk and what treatment is best for you. A heart-healthy lifestyle along with medicines can help lower your cholesterol and your risk. The way you choose to lower your risk will depend on how high your risk is for heart attack and stroke. It will also depend on how you feel about taking medicines. Follow-up care is a key part of your treatment and safety. Be sure to make and go to all appointments, and call your doctor if you are having problems. It's also a good idea to know your test results and keep a list of the medicines you take. How can you care for yourself at home? · Eat a variety of foods every day.  Good choices include fruits, vegetables, whole grains (like oatmeal), dried beans and peas, nuts and seeds, soy products (like tofu), and fat-free or low-fat dairy products. · Replace butter, margarine, and hydrogenated or partially hydrogenated oils with olive and canola oils. (Canola oil margarine without trans fat is fine.) · Replace red meat with fish, poultry, and soy protein (like tofu). · Limit processed and packaged foods like chips, crackers, and cookies. · Bake, broil, or steam foods. Don't jarrell them. · Be physically active. Get at least 30 minutes of exercise on most days of the week. Walking is a good choice. You also may want to do other activities, such as running, swimming, cycling, or playing tennis or team sports. · Stay at a healthy weight or lose weight by making the changes in eating and physical activity listed above. Losing just a small amount of weight, even 5 to 10 pounds, can reduce your risk for having a heart attack or stroke. · Do not smoke. When should you call for help? Watch closely for changes in your health, and be sure to contact your doctor if: 
· You need help making lifestyle changes. · You have questions about your medicine. Where can you learn more? Go to http://tiffany-clare.info/. Enter F528 in the search box to learn more about \"High Cholesterol: Care Instructions. \" Current as of: April 3, 2017 Content Version: 11.3 © 4898-5085 [employer]. Care instructions adapted under license by [employer] (which disclaims liability or warranty for this information). If you have questions about a medical condition or this instruction, always ask your healthcare professional. [place] [address] any warranty or liability for your use of this information. Statins: Care Instructions Your Care Instructions Statins are medicines that lower your cholesterol and your risk for a heart attack and stroke. Cholesterol is a type of fat in your blood. If you have too much cholesterol, it can build up in blood vessels. This raises your risk of heart disease, heart attack, and stroke. Statins lower cholesterol by blocking how much cholesterol your body makes. This prevents cholesterol from building up in your blood vessels. This is called hardening of the arteries. It is the starting point for some heart and blood flow problems, such as heart disease. Statins may also reduce inflammation around the buildup (called plaque). This can lower the risk that the plaque will break apart and lead to a heart attack or stroke. A heart-healthy lifestyle is important for lowering your risk whether you take statins or not. This includes eating healthy foods, being active, staying at a healthy weight, and not smoking. You must take statins regularly for them to work well. If you stop, your cholesterol and your risk will go back up. Examples of statins include: · Atorvastatin (Lipitor). · Lovastatin (Mevacor). · Pravastatin (Pravachol). · Simvastatin (Zocor). Statins interact with many medicines. So tell your doctor all of the other medicines that you take. These include prescription medicines, over-the-counter medicines, dietary supplements, and herbal products. Follow-up care is a key part of your treatment and safety. Be sure to make and go to all appointments, and call your doctor if you are having problems. It's also a good idea to know your test results and keep a list of the medicines you take. How can you care for yourself at home? · Take statins exactly as your doctor tells you. High cholesterol has no symptoms. So it is easy to forget to take the pills. Try to make a system that reminds you to take them. · Do not take two or more medicines at the same time unless the doctor told you to. Statins can interact with other medicines. · Always tell your doctor if you think you are having a side effect.  If side effects are a problem with one medicine, a different one may be used. · Keep making the lifestyle changes your doctor suggests. Eat heart-healthy foods, be active, don't smoke, and stay at a healthy weight. · Talk to your doctor about avoiding grapefruit juice if you take statins. Grapefruit juice can raise the level of this medicine in your blood. This could increase side effects. When should you call for help? Watch closely for changes in your health, and be sure to contact your doctor if: 
· You have side effects of statins. These include: ¨ Fatigue. ¨ Upset stomach. ¨ Gas. ¨ Constipation. ¨ Pain or cramps in the belly. ¨ Muscle aches. · You have any new symptoms or side effects. Where can you learn more? Go to http://tiffany-clare.info/. Enter R358 in the search box to learn more about \"Statins: Care Instructions. \" Current as of: April 3, 2017 Content Version: 11.3 © 9231-8193 [employer]. Care instructions adapted under license by [employer] (which disclaims liability or warranty for this information). If you have questions about a medical condition or this instruction, always ask your healthcare professional. [place] [address] any warranty or liability for your use of this information. [address] What is a living will? A living will is a legal form you use to write down the kind of care you want at the end of your life. It is used by the health professionals who will treat you if you aren't able to decide for yourself. If you put your wishes in writing, your loved ones and others will know what kind of care you want. They won't need to guess. This can ease your mind and be helpful to others. A living will is not the same as an estate or property will. An estate will explains what you want to happen with your money and property after you die. Is a living will a legal document? A living will is a legal document. Each state has its own laws about living chicas. If you move to another state, make sure that your living will is legal in the state where you now live. Or you might use a universal form that has been approved by many states. This kind of form can sometimes be completed and stored online. Your electronic copy will then be available wherever you have a connection to the Internet. In most cases, doctors will respect your wishes even if you have a form from a different state. · You don't need an  to complete a living will. But legal advice can be helpful if your state's laws are unclear, your health history is complicated, or your family can't agree on what should be in your living will. · You can change your living will at any time. Some people find that their wishes about end-of-life care change as their health changes. · In addition to making a living will, think about completing a medical power of  form. This form lets you name the person you want to make end-of-life treatment decisions for you (your \"health care agent\") if you're not able to. Many hospitals and nursing homes will give you the forms you need to complete a living will and a medical power of . · Your living will is used only if you can't make or communicate decisions for yourself anymore. If you become able to make decisions again, you can accept or refuse any treatment, no matter what you wrote in your living will. · Your state may offer an online registry. This is a place where you can store your living will online so the doctors and nurses who need to treat you can find it right away. What should you think about when creating a living will? Talk about your end-of-life wishes with your family members and your doctor. Let them know what you want. That way the people making decisions for you won't be surprised by your choices. Think about these questions as you make your living will: · Do you know enough about life support methods that might be used? If not, talk to your doctor so you know what might be done if you can't breathe on your own, your heart stops, or you're unable to swallow. · What things would you still want to be able to do after you receive life-support methods? Would you want to be able to walk? To speak? To eat on your own? To live without the help of machines? · If you have a choice, where do you want to be cared for? In your home? At a hospital or nursing home? · Do you want certain Jehovah's witness practices performed if you become very ill? · If you have a choice at the end of your life, where would you prefer to die? At home? In a hospital or nursing home? Somewhere else? · Would you prefer to be buried or cremated? · Do you want your organs to be donated after you die? What should you do with your living will? · Make sure that your family members and your health care agent have copies of your living will. · Give your doctor a copy of your living will to keep in your medical record. If you have more than one doctor, make sure that each one has a copy. · You may want to put a copy of your living will where it can be easily found. Where can you learn more? Go to http://tiffany-clare.info/. Enter W465 in the search box to learn more about \"Learning About Living Alireza. \" Current as of: August 8, 2016 Content Version: 11.3 © 1577-3951 [employer]. Care instructions adapted under license by [employer] (which disclaims liability or warranty for this information). If you have questions about a medical condition or this instruction, always ask your healthcare professional. [address] any warranty or liability for your use of this information. Introducing [facility]!    
 Tawnya Terrazas introduces [employer] patient portal. Now you can access parts of your medical record, email your doctor's office, and request medication refills online. 1. In your internet browser, go to [email or web address] 2. Click on the First Time User? Click Here link in the Sign In box. You will see the New Member Sign Up page. 3. Enter your App DreamWorks Access Code exactly as it appears below. You will not need to use this code after youve completed the sign-up process. If you do not sign up before the expiration date, you must request a new code. · App DreamWorks Access Code: [email or web address] Expires: 10/31/2017 10:39 AM 
 
4. Enter the last four digits of your Social Security Number (xxxx) and Date of Birth (mm/dd/yyyy) as indicated and click Submit. You will be taken to the next sign-up page. 5. Create a App DreamWorks ID. This will be your App DreamWorks login ID and cannot be changed, so think of one that is secure and easy to remember. 6. Create a [employer]Works password. You can change your password at any time. 7. Enter your Password Reset Question and Answer. This can be used at a later time if you forget your password. 8. Enter your e-mail address. You will receive e-mail notification when new information is available in [address] 9. Click Sign Up. You can now view and download portions of your medical record. 10. Click the Download Summary menu link to download a portable copy of your medical information. If you have questions, please visit the Frequently Asked Questions section of the App DreamWorks website. Remember, App [employer]Works is NOT to be used for urgent needs. For medical emergencies, dial 911. Now available from your iPhone and Android! Please provide this summary of care documentation to your next provider. Your primary care clinician is listed as Kellie Boles. If you have any questions after today's visit, please call [phone].

## 2017-09-13 NOTE — PATIENT INSTRUCTIONS
Medicare Wellness Visit, Male    The best way to live healthy is to have a healthy lifestyle by eating a well-balanced diet, exercising regularly, limiting alcohol and stopping smoking. Regular physical exams and screening tests are another way to keep healthy. Preventive exams provided by your health care provider can find health problems before they become diseases or illnesses. Preventive services including immunizations, screening tests, monitoring and exams can help you take care of your own health. All people over age 72 should have a pneumovax  and and a prevnar shot to prevent pneumonia. These are once in a lifetime unless you and your provider decide differently. All people over 65 should have a yearly flu shot and a tetanus vaccine every 10 years. Screening for diabetes mellitus with a blood sugar test should be done every year. Glaucoma is a disease of the eye due to increased ocular pressure that can lead to blindness and it should be done every year by an eye professional.    Cardiovascular screening tests that check for elevated lipids (fatty part of blood) which can lead to heart disease and strokes should be done every 5 years. Colorectal screening that evaluates for blood or polyps in your colon should be done yearly as a stool test or every five years as a flexible sigmoidoscope or every 10 years as a colonoscopy up to age 76. Men up to age 76 may need a screening blood test for prostate cancer at certain intervals, depending on their personal and family history. This decision is between the patient and his provider. If you have been a smoker or had family history of abdominal aortic aneurysms, you and your provider may decide to schedule an ultrasound test of your aorta. Hepatitis C screening is also recommended for anyone born between [birth year] through [address]. A shingles vaccine is also recommended once in a lifetime after age 61.     Your Medicare Wellness Exam is recommended annually. Here is a list of your current Health Maintenance items with a due date:  Health Maintenance Due   Topic Date Due    Glaucoma Screening   02/09/2012    Annual Well Visit  08/03/2017            High Cholesterol: Care Instructions  Your Care Instructions  Cholesterol is a type of fat in your blood. It is needed for many body functions, such as making new cells. Cholesterol is made by your body. It also comes from food you eat. High cholesterol means that you have too much of the fat in your blood. This raises your risk of a heart attack and stroke. LDL and HDL are part of your total cholesterol. LDL is the \"bad\" cholesterol. High LDL can raise your risk for heart disease, heart attack, and stroke. HDL is the \"good\" cholesterol. It helps clear bad cholesterol from the body. High HDL is linked with a lower risk of heart disease, heart attack, and stroke. Your cholesterol levels help your doctor find out your risk for having a heart attack or stroke. You and your doctor can talk about whether you need to lower your risk and what treatment is best for you. A heart-healthy lifestyle along with medicines can help lower your cholesterol and your risk. The way you choose to lower your risk will depend on how high your risk is for heart attack and stroke. It will also depend on how you feel about taking medicines. Follow-up care is a key part of your treatment and safety. Be sure to make and go to all appointments, and call your doctor if you are having problems. It's also a good idea to know your test results and keep a list of the medicines you take. How can you care for yourself at home? · Eat a variety of foods every day. Good choices include fruits, vegetables, whole grains (like oatmeal), dried beans and peas, nuts and seeds, soy products (like tofu), and fat-free or low-fat dairy products.   · Replace butter, margarine, and hydrogenated or partially hydrogenated oils with olive and canola oils. (Canola oil margarine without trans fat is fine.)  · Replace red meat with fish, poultry, and soy protein (like tofu). · Limit processed and packaged foods like chips, crackers, and cookies. · Bake, broil, or steam foods. Don't jarrell them. · Be physically active. Get at least 30 minutes of exercise on most days of the week. Walking is a good choice. You also may want to do other activities, such as running, swimming, cycling, or playing tennis or team sports. · Stay at a healthy weight or lose weight by making the changes in eating and physical activity listed above. Losing just a small amount of weight, even 5 to 10 pounds, can reduce your risk for having a heart attack or stroke. · Do not smoke. When should you call for help? Watch closely for changes in your health, and be sure to contact your doctor if:  · You need help making lifestyle changes. · You have questions about your medicine. Where can you learn more? Go to http://tiffany-clare.info/. Enter D965 in the search box to learn more about \"High Cholesterol: Care Instructions. \"  Current as of: April 3, 2017  Content Version: 11.3  © 1023-8511 [employer]. Care instructions adapted under license by [employer] (which disclaims liability or warranty for this information). If you have questions about a medical condition or this instruction, always ask your healthcare professional. [place] [address] any warranty or liability for your use of this information. Statins: Care Instructions  Your Care Instructions  Statins are medicines that lower your cholesterol and your risk for a heart attack and stroke. Cholesterol is a type of fat in your blood. If you have too much cholesterol, it can build up in blood vessels. This raises your risk of heart disease, heart attack, and stroke. Statins lower cholesterol by blocking how much cholesterol your body makes.  This prevents cholesterol from building up in your blood vessels. This is called hardening of the arteries. It is the starting point for some heart and blood flow problems, such as heart disease. Statins may also reduce inflammation around the buildup (called plaque). This can lower the risk that the plaque will break apart and lead to a heart attack or stroke. A heart-healthy lifestyle is important for lowering your risk whether you take statins or not. This includes eating healthy foods, being active, staying at a healthy weight, and not smoking. You must take statins regularly for them to work well. If you stop, your cholesterol and your risk will go back up. Examples of statins include:  · Atorvastatin (Lipitor). · Lovastatin (Mevacor). · Pravastatin (Pravachol). · Simvastatin (Zocor). Statins interact with many medicines. So tell your doctor all of the other medicines that you take. These include prescription medicines, over-the-counter medicines, dietary supplements, and herbal products. Follow-up care is a key part of your treatment and safety. Be sure to make and go to all appointments, and call your doctor if you are having problems. It's also a good idea to know your test results and keep a list of the medicines you take. How can you care for yourself at home? · Take statins exactly as your doctor tells you. High cholesterol has no symptoms. So it is easy to forget to take the pills. Try to make a system that reminds you to take them. · Do not take two or more medicines at the same time unless the doctor told you to. Statins can interact with other medicines. · Always tell your doctor if you think you are having a side effect. If side effects are a problem with one medicine, a different one may be used. · Keep making the lifestyle changes your doctor suggests. Eat heart-healthy foods, be active, don't smoke, and stay at a healthy weight. · Talk to your doctor about avoiding grapefruit juice if you take statins. Grapefruit juice can raise the level of this medicine in your blood. This could increase side effects. When should you call for help? Watch closely for changes in your health, and be sure to contact your doctor if:  · You have side effects of statins. These include:  ¨ Fatigue. ¨ Upset stomach. ¨ Gas. ¨ Constipation. ¨ Pain or cramps in the belly. ¨ Muscle aches. · You have any new symptoms or side effects. Where can you learn more? Go to http://tiffany-clare.info/. Enter R358 in the search box to learn more about \"Statins: Care Instructions. \"  Current as of: April 3, 2017  Content Version: 11.3  © 3296-1059 [employer]. Care instructions adapted under license by [employer] (which disclaims liability or warranty for this information). If you have questions about a medical condition or this instruction, always ask your healthcare professional. [place] [address] any warranty or liability for your use of this information. Learning About Living [place]roy  What is a living will? A living will is a legal form you use to write down the kind of care you want at the end of your life. It is used by the health professionals who will treat you if you aren't able to decide for yourself. If you put your wishes in writing, your loved ones and others will know what kind of care you want. They won't need to guess. This can ease your mind and be helpful to others. A living will is not the same as an estate or property will. An estate will explains what you want to happen with your money and property after you die. Is a living will a legal document? A living will is a legal document. Each state has its own laws about living chicas. If you move to another state, make sure that your living will is legal in the state where you now live. Or you might use a universal form that has been approved by many states.  This kind of form can sometimes be completed and stored online. Your electronic copy will then be available wherever you have a connection to the Internet. In most cases, doctors will respect your wishes even if you have a form from a different state. · You don't need an  to complete a living will. But legal advice can be helpful if your state's laws are unclear, your health history is complicated, or your family can't agree on what should be in your living will. · You can change your living will at any time. Some people find that their wishes about end-of-life care change as their health changes. · In addition to making a living will, think about completing a medical power of  form. This form lets you name the person you want to make end-of-life treatment decisions for you (your \"health care agent\") if you're not able to. Many hospitals and nursing homes will give you the forms you need to complete a living will and a medical power of . · Your living will is used only if you can't make or communicate decisions for yourself anymore. If you become able to make decisions again, you can accept or refuse any treatment, no matter what you wrote in your living will. · Your state may offer an online registry. This is a place where you can store your living will online so the doctors and nurses who need to treat you can find it right away. What should you think about when creating a living will? Talk about your end-of-life wishes with your family members and your doctor. Let them know what you want. That way the people making decisions for you won't be surprised by your choices. Think about these questions as you make your living will:  · Do you know enough about life support methods that might be used? If not, talk to your doctor so you know what might be done if you can't breathe on your own, your heart stops, or you're unable to swallow. · What things would you still want to be able to do after you receive life-support methods?  Would you want to be able to walk? To speak? To eat on your own? To live without the help of machines? · If you have a choice, where do you want to be cared for? In your home? At a hospital or nursing home? · Do you want certain Jew practices performed if you become very ill? · If you have a choice at the end of your life, where would you prefer to die? At home? In a hospital or nursing home? Somewhere else? · Would you prefer to be buried or cremated? · Do you want your organs to be donated after you die? What should you do with your living will? · Make sure that your family members and your health care agent have copies of your living will. · Give your doctor a copy of your living will to keep in your medical record. If you have more than one doctor, make sure that each one has a copy. · You may want to put a copy of your living will where it can be easily found. Where can you learn more? Go to http://tiffany-clare.info/. Enter N673 in the search box to learn more about \"Learning About Living Al[place]za. \"  Current as of: August 8, 2016  Content Version: 11.3  © 4061-1791 [employer], [employer]. Care instructions adapted under license by [employer] (which disclaims liability or warranty for this information). If you have questions about a medical condition or this instruction, always ask your healthcare professional. [place] [address] any warranty or liability for your use of this information.

## 2017-09-14 LAB
ALBUMIN SERPL-MCNC: 3.6 G/DL (ref 3.5–4.8)
ALBUMIN/GLOB SERPL: 1.6 {RATIO} (ref 1.2–2.2)
ALP SERPL-CCNC: 65 IU/L (ref 39–117)
ALT SERPL-CCNC: 23 IU/L (ref 0–44)
AST SERPL-CCNC: 33 IU/L (ref 0–40)
BILIRUB SERPL-MCNC: 0.2 MG/DL (ref 0–1.2)
BUN SERPL-MCNC: 7 MG/DL (ref 8–27)
BUN/CREAT SERPL: 7 (ref 10–24)
CALCIUM SERPL-MCNC: 8.6 MG/DL (ref 8.6–10.2)
CHLORIDE SERPL-SCNC: 104 MMOL/L (ref 96–106)
CO2 SERPL-SCNC: 29 MMOL/L (ref 18–29)
CREAT SERPL-MCNC: 1.01 MG/DL (ref 0.76–1.27)
GLOBULIN SER CALC-MCNC: 2.3 G/DL (ref 1.5–4.5)
GLUCOSE SERPL-MCNC: 109 MG/DL (ref 65–99)
POTASSIUM SERPL-SCNC: 3.8 MMOL/L (ref 3.5–5.2)
PROT SERPL-MCNC: 5.9 G/DL (ref 6–8.5)
SODIUM SERPL-SCNC: 145 MMOL/L (ref 134–144)

## 2017-09-18 ENCOUNTER — HOSPITAL ENCOUNTER (OUTPATIENT)
Dept: LAB | Age: 72
Discharge: HOME | End: 2017-09-18
Payer: MEDICARE

## 2017-09-18 PROCEDURE — 82270 OCCULT BLOOD FECES: CPT

## 2017-09-25 LAB — HEMOCCULT STL QL IA: NEGATIVE

## 2017-10-25 ENCOUNTER — OFFICE VISIT (OUTPATIENT)
Dept: FAMILY MEDICINE CLINIC | Age: 72
End: 2017-10-25

## 2017-10-25 ENCOUNTER — HOSPITAL ENCOUNTER (OUTPATIENT)
Dept: LAB | Age: 72
Discharge: HOME | End: 2017-10-25
Payer: MEDICARE

## 2017-10-25 VITALS
DIASTOLIC BLOOD PRESSURE: 71 MMHG | SYSTOLIC BLOOD PRESSURE: 124 MMHG | BODY MASS INDEX: 27.87 KG/M2 | TEMPERATURE: 97.5 F | WEIGHT: 205.8 LBS | HEART RATE: 69 BPM | RESPIRATION RATE: 14 BRPM | HEIGHT: 72 IN | OXYGEN SATURATION: 98 %

## 2017-10-25 DIAGNOSIS — Z23 ENCOUNTER FOR IMMUNIZATION: ICD-10-CM

## 2017-10-25 DIAGNOSIS — R60.0 LOCALIZED EDEMA: ICD-10-CM

## 2017-10-25 DIAGNOSIS — Z13.5 SCREENING FOR GLAUCOMA: Primary | ICD-10-CM

## 2017-10-25 PROCEDURE — 80053 COMPREHEN METABOLIC PANEL: CPT

## 2017-10-25 PROCEDURE — 36415 COLL VENOUS BLD VENIPUNCTURE: CPT

## 2017-10-25 RX ORDER — DOCUSATE SODIUM 100 MG
CAPSULE ORAL
Qty: 1 BOTTLE | Refills: 4 | Status: SHIPPED | OUTPATIENT
Start: 2017-10-25 | End: 2018-06-19 | Stop reason: ALTCHOICE

## 2017-10-25 RX ORDER — BUMETANIDE 2 MG/1
2 TABLET ORAL 2 TIMES DAILY
Qty: 60 TAB | Refills: 2
Start: 2017-10-25 | End: 2017-12-18 | Stop reason: SDUPTHER

## 2017-10-25 RX ORDER — METOLAZONE 5 MG/1
5 TABLET ORAL 2 TIMES DAILY
Qty: 60 TAB | Refills: 2
Start: 2017-10-25 | End: 2017-12-18 | Stop reason: ALTCHOICE

## 2017-10-25 RX ORDER — POTASSIUM CHLORIDE 20 MEQ/1
20 TABLET, EXTENDED RELEASE ORAL 2 TIMES DAILY
Qty: 60 TAB | Refills: 2
Start: 2017-10-25 | End: 2017-12-18 | Stop reason: SDUPTHER

## 2017-10-25 NOTE — PROGRESS NOTES
HISTORY OF PRESENT ILLNESS  Oliver Valentine is a 81 y.o. male. HPI     Patient present with b/lSwelling of the lower ext,   Stating that he does a lot of walking fortunately walking has been helping the swelling to go away, on the pt last visits, he did have much of the legs swelling and  weight was also higher than today's weight,  Today the patient denies leg pain, denies rash, and legs lesion, in addition the pt stated that denial of dizziness,  the legs B swelling not only not better but also the wt went up by close to 25 pounds,   Vitals 10/25/2017 9/13/2017   Blood Pressure 124/71 129/68   Pulse 69 62   Temp 97.5 97.4   Resp 14 14   Height 6' 0\" 6' 0\"   Weight 205 lb 12.8 oz 225 lb 6.4 oz     Pt stating that he is feeling much better lighter able to move, legs that are nto that heavy,     Current Outpatient Prescriptions   Medication Sig Dispense Refill    metOLazone (ZAROXOLYN) 5 mg tablet Take 1 Tab by mouth two (2) times a day. 60 Tab 2    bumetanide (BUMEX) 2 mg tablet Take 1 Tab by mouth two (2) times a day. Indications: Edema 60 Tab 2    potassium chloride (K-DUR, KLOR-CON) 20 mEq tablet Take 1 Tab by mouth two (2) times a day. Indications: hypokalemia prevention 60 Tab 2    aspirin (CHILDREN'S ASPIRIN) 81 mg chewable tablet TAKE ONE TABLET BY MOUTH EVERY DAY 30 Tab 11    cholecalciferol (VITAMIN D3) 1,000 unit cap One tab daily 30 Cap 11    cyanocobalamin 2,500 mcg Subl 1 Tab by SubLINGual route daily.  30 Tab 6     No Known Allergies  Past Medical History:   Diagnosis Date    Elevated PSA, less than 10 ng/ml 12/10/2012    Hypertension     Hypokalemia 7/25/2014    Localized edema 2/7/2017    Vitamin D deficiency 7/8/2014    WBC decreased 7/6/2012     Past Surgical History:   Procedure Laterality Date    COLONOSCOPY,DIAGNOSTIC  2/5/2015         HX ORTHOPAEDIC      left knee surg in  1984     Family History   Problem Relation Age of Onset    Kidney Disease Father      Social History   Substance Use Topics    Smoking status: Never Smoker    Smokeless tobacco: Never Used    Alcohol use No      Lab Results  Component Value Date/Time   WBC 3.8 03/21/2017 10:01 AM   HGB 14.3 03/21/2017 10:01 AM   HCT 42.2 03/21/2017 10:01 AM   PLATELET 857 02/99/0532 10:01 AM   MCV 86 03/21/2017 10:01 AM     Lab Results  Component Value Date/Time   GFR est non-AA 75 09/13/2017 11:52 AM   GFR est AA 87 09/13/2017 11:52 AM   Creatinine 1.01 09/13/2017 11:52 AM   BUN 7 09/13/2017 11:52 AM   Sodium 145 09/13/2017 11:52 AM   Potassium 3.8 09/13/2017 11:52 AM   Chloride 104 09/13/2017 11:52 AM   CO2 29 09/13/2017 11:52 AM   Magnesium 2.1 01/13/2016 09:03 AM        Review of Systems   Constitutional: Negative for chills and fever. HENT: Negative for ear pain and nosebleeds. Eyes: Negative for blurred vision, pain and discharge. Respiratory: Negative for shortness of breath. Cardiovascular: Negative for chest pain and leg swelling. Gastrointestinal: Negative for constipation, diarrhea, nausea and vomiting. Genitourinary: Negative for frequency. Musculoskeletal: Negative for joint pain. Skin: Negative for itching and rash. Neurological: Negative for headaches. Psychiatric/Behavioral: Negative for depression. The patient is not nervous/anxious. Physical Exam   Constitutional: He is oriented to person, place, and time. He appears well-developed and well-nourished. HENT:   Head: Normocephalic and atraumatic. Mouth/Throat: No oropharyngeal exudate. Eyes: Conjunctivae and EOM are normal.   Neck: Normal range of motion. Neck supple. Cardiovascular: Normal rate, regular rhythm and normal heart sounds. No murmur heard. Pulmonary/Chest: Effort normal and breath sounds normal. No respiratory distress. Abdominal: Soft. Bowel sounds are normal. He exhibits no distension. There is no rebound. Musculoskeletal: He exhibits no tenderness.    Dropped from 4+ above the knee to just ankle 2+   Neurological: He is alert and oriented to person, place, and time. Skin: Skin is warm. No erythema. Psychiatric: He has a normal mood and affect. His behavior is normal.   Nursing note and vitals reviewed. ASSESSMENT and PLAN  Diagnoses and all orders for this visit:    1. Screening for glaucoma  -     REFERRAL TO OPTOMETRY  -     bumetanide (BUMEX) 2 mg tablet; Take 1 Tab by mouth two (2) times a day. Indications: Edema  -     potassium chloride (K-DUR, KLOR-CON) 20 mEq tablet; Take 1 Tab by mouth two (2) times a day. Indications: hypokalemia prevention  -     METABOLIC PANEL, COMPREHENSIVE    2. Encounter for immunization  -     bumetanide (BUMEX) 2 mg tablet; Take 1 Tab by mouth two (2) times a day. Indications: Edema  -     potassium chloride (K-DUR, KLOR-CON) 20 mEq tablet; Take 1 Tab by mouth two (2) times a day. Indications: hypokalemia prevention  -     METABOLIC PANEL, COMPREHENSIVE    3. Localized edema  -     bumetanide (BUMEX) 2 mg tablet; Take 1 Tab by mouth two (2) times a day. Indications: Edema  -     potassium chloride (K-DUR, KLOR-CON) 20 mEq tablet; Take 1 Tab by mouth two (2) times a day. Indications: hypokalemia prevention  -     METABOLIC PANEL, COMPREHENSIVE    Other orders  -     metOLazone (ZAROXOLYN) 5 mg tablet; Take 1 Tab by mouth two (2) times a day.   -     electrolytes-dextrose (PEDIALYTE) soln solution; 1-2 bottles daily    So far his kidney function test has been staying normal sodium at 145 normal potassium normal filtration rate and BUN and creatinine continue with current medication repeat and reevaluate in 6 weeks patient agreed with today's recommendations

## 2017-10-25 NOTE — MR AVS SNAPSHOT
Visit Information Date & Time Provider Department Dept. Phone Encounter #  
 10/25/2017 12:45 PM Marylou Forde MD [facility] OFFICE-[facility] [phone] [id number] Upcoming Health Maintenance Date Due  
 GLAUCOMA SCREENING Q2Y 2/9/2012 MEDICARE YEARLY EXAM 9/14/2018 COLON CANCER SCRN (BARIUM / CT COLO / FLX SIG) Q5 2/5/2020 DTaP/Tdap/Td series (2 - Td) 7/8/2024 Allergies as of 10/25/2017  Review Complete On: 9/13/2017 By: Frankie Valente LPN No Known Allergies Current Immunizations  Reviewed on 2/7/2017 Name Date Pneumococcal Conjugate (PCV-13) 7/13/2015  9:26 AM  
 Pneumococcal Polysaccharide (PPSV-23) 7/8/2014 Tdap 7/8/2014 Not reviewed this visit You Were Diagnosed With   
  
 Codes Comments Screening for glaucoma    -  Primary ICD-10-CM: Z13.5 ICD-9-CM: V80.1 Encounter for immunization     ICD-10-CM: W98 ICD-9-CM: V03.89 Localized edema     ICD-10-CM: R60.0 ICD-9-CM: 668. 3 Vitals BP Pulse Temp Resp Height(growth percentile) Weight(growth percentile) 124/71 (BP 1 Location: Left arm, BP Patient Position: At rest) 69 97.5 °F (36.4 °C) (Oral) 14 6' (1.829 m) 205 lb 12.8 oz (93.4 kg) SpO2 BMI Smoking Status 98% 27.91 kg/m2 Never Smoker Vitals History BMI and BSA Data Body Mass Index Body Surface Area  
 27.91 kg/m 2 2.18 m 2 Preferred Pharmacy Pharmacy Name Phone [facility] [address], [address] [place] [address] Avenue [phone] Your Updated Medication List  
  
   
This list is accurate as of: 10/25/17  2:01 PM.  Always use your most recent med list.  
  
  
  
  
 aspirin 81 mg chewable tablet Commonly known as:  CHILDREN'S ASPIRIN  
TAKE ONE TABLET BY MOUTH EVERY DAY  
  
 bumetanide 2 mg tablet Commonly known as:  Merla Goods Take 1 Tab by mouth two (2) times a day. Indications: Edema  
  
 cholecalciferol 1,000 unit Cap Commonly known as:  VITAMIN D3 One tab daily  
  
 cyanocobalamin 2,500 mcg sublingual tablet Commonly known as:  VITAMIN B12  
1 Tab by SubLINGual route daily. electrolytes-dextrose Soln solution Commonly known as:  PEDIALYTE  
1-2 bottles daily  
  
 metOLazone 5 mg tablet Commonly known as:  Cherrie Dao Take 1 Tab by mouth two (2) times a day. potassium chloride 20 mEq tablet Commonly known as:  K-DUR, KLOR-CON Take 1 Tab by mouth two (2) times a day. Indications: hypokalemia prevention Prescriptions Printed Refills  
 electrolytes-dextrose (PEDIALYTE) soln solution 4 Si-2 bottles daily Class: Print We Performed the Following METABOLIC PANEL, COMPREHENSIVE [50993 CPT(R)] REFERRAL TO OPTOMETRY [id number] Custom] Comments:  
 Please evaluate patient for glaucoma. Referral Information Referral ID Referred By Referred To [id number], MD   
   [address]   
   Sybil Hernandez, [address] Phone: [phone] Fax: [phone] Visits Status Start Date End Date 1 New Request 10/25/17 10/25/18 If your referral has a status of pending review or denied, additional information will be sent to support the outcome of this decision. Introducing [facility]! [employer] introduces BeliefNet patient portal. Now you can access parts of your medical record, email your doctor's office, and request medication refills online. 1. In your internet browser, go to [email or web address] 2. Click on the First Time User? Click Here link in the Sign In box. You will see the New Member Sign Up page. 3. Enter your BeliefNet Access Code exactly as it appears below. You will not need to use this code after youve completed the sign-up process. If you do not sign up before the expiration date, you must request a new code. · BeliefNet Access Code: [email or web address] Expires: 10/31/2017 10:39 AM 
 
4. Enter the last four digits of your Social Security Number (xxxx) and Date of Birth (mm/dd/yyyy) as indicated and click Submit. You will be taken to the next sign-up page. 5. Create a BYOM! ID. This will be your BYOM! login ID and cannot be changed, so think of one that is secure and easy to remember. 6. Create a BYOM! password. You can change your password at any time. 7. Enter your Password Reset Question and Answer. This can be used at a later time if you forget your password. 8. Enter your e-mail address. You will receive e-mail notification when new information is available in [address] 9. Click Sign Up. You can now view and download portions of your medical record. 10. Click the Download Summary menu link to download a portable copy of your medical information. If you have questions, please visit the Frequently Asked Questions section of the BYOM! website. Remember, BYOM! is NOT to be used for urgent needs. For medical emergencies, dial 911. Now available from your iPhone and Android! Please provide this summary of care documentation to your next provider. Your primary care clinician is listed as Aliyah Odell. If you have any questions after today's visit, please call [phone].

## 2017-10-25 NOTE — PROGRESS NOTES
Emiliana Romero          Name and  verified      Chief Complaint   Patient presents with    Leg Swelling     f/u           Health Maintenance reviewed-discussed with patient.

## 2017-10-26 LAB
ALBUMIN SERPL-MCNC: 3.7 G/DL (ref 3.5–4.8)
ALBUMIN/GLOB SERPL: 1.4 {RATIO} (ref 1.2–2.2)
ALP SERPL-CCNC: 77 IU/L (ref 39–117)
ALT SERPL-CCNC: 13 IU/L (ref 0–44)
AST SERPL-CCNC: 24 IU/L (ref 0–40)
BILIRUB SERPL-MCNC: 0.4 MG/DL (ref 0–1.2)
BUN SERPL-MCNC: 8 MG/DL (ref 8–27)
BUN/CREAT SERPL: 7 (ref 10–24)
CALCIUM SERPL-MCNC: 9.1 MG/DL (ref 8.6–10.2)
CHLORIDE SERPL-SCNC: 105 MMOL/L (ref 96–106)
CO2 SERPL-SCNC: 27 MMOL/L (ref 18–29)
CREAT SERPL-MCNC: 1.1 MG/DL (ref 0.76–1.27)
GFR SERPLBLD CREATININE-BSD FMLA CKD-EPI: 68 ML/MIN/1.73
GFR SERPLBLD CREATININE-BSD FMLA CKD-EPI: 78 ML/MIN/1.73
GLOBULIN SER CALC-MCNC: 2.6 G/DL (ref 1.5–4.5)
GLUCOSE SERPL-MCNC: 88 MG/DL (ref 65–99)
POTASSIUM SERPL-SCNC: 4.5 MMOL/L (ref 3.5–5.2)
PROT SERPL-MCNC: 6.3 G/DL (ref 6–8.5)
SODIUM SERPL-SCNC: 145 MMOL/L (ref 134–144)

## 2017-12-18 ENCOUNTER — HOSPITAL ENCOUNTER (OUTPATIENT)
Dept: LAB | Age: 72
Discharge: HOME | End: 2017-12-18
Payer: MEDICARE

## 2017-12-18 ENCOUNTER — OFFICE VISIT (OUTPATIENT)
Dept: FAMILY MEDICINE CLINIC | Age: 72
End: 2017-12-18

## 2017-12-18 VITALS
HEIGHT: 72 IN | RESPIRATION RATE: 14 BRPM | TEMPERATURE: 98.6 F | DIASTOLIC BLOOD PRESSURE: 88 MMHG | BODY MASS INDEX: 26.98 KG/M2 | WEIGHT: 199.2 LBS | OXYGEN SATURATION: 98 % | HEART RATE: 80 BPM | SYSTOLIC BLOOD PRESSURE: 138 MMHG

## 2017-12-18 DIAGNOSIS — Z23 ENCOUNTER FOR IMMUNIZATION: ICD-10-CM

## 2017-12-18 DIAGNOSIS — R60.0 LOCALIZED EDEMA: Primary | ICD-10-CM

## 2017-12-18 DIAGNOSIS — Z13.5 SCREENING FOR GLAUCOMA: ICD-10-CM

## 2017-12-18 PROCEDURE — 36415 COLL VENOUS BLD VENIPUNCTURE: CPT

## 2017-12-18 PROCEDURE — 80048 BASIC METABOLIC PNL TOTAL CA: CPT

## 2017-12-18 RX ORDER — BUMETANIDE 2 MG/1
2 TABLET ORAL 2 TIMES DAILY
Qty: 60 TAB | Refills: 2
Start: 2017-12-18 | End: 2018-03-19 | Stop reason: ALTCHOICE

## 2017-12-18 RX ORDER — POTASSIUM CHLORIDE 20 MEQ/1
20 TABLET, EXTENDED RELEASE ORAL 2 TIMES DAILY
Qty: 60 TAB | Refills: 2
Start: 2017-12-18 | End: 2018-03-19 | Stop reason: SDUPTHER

## 2017-12-18 NOTE — PROGRESS NOTES
Name and  verified        Chief Complaint   Patient presents with    Results    Medication Evaluation         Health Maintenance reviewed-discussed with patient. 1. Have you been to the ER, urgent care clinic since your last visit? Hospitalized since your last visit? No    2. Have you seen or consulted any other health care providers outside of the [address] [place] [address] since your last visit? Include any pap smears or colon screening.  No

## 2017-12-18 NOTE — PROGRESS NOTES
HISTORY OF PRESENT ILLNESS  Luis Antonio De La Vega is a 81 y.o. male. HPI   Patient present with b/lSwelling of the lower ext,   Stating that he does a lot of walking at work but unfortunately walking has not been helping the swelling to go away, on the pt last visits, he did have much of the legs swelling and his weight was also worse than today's weight,  Today the patient denies leg pain, denies rash, and legs lesion, in addition the pt stated that denial of dizziness,  the legs B swelling not only much better but also the wt went down more than >25 pounds since the sept of 2017, had done some unknown surgery on the left leg many urs ago, today the Rt swelling is gone away but has some remnant left leg swelling,  Vitals 12/18/2017 10/25/2017 9/13/2017   Weight 199 lb 3.2 oz 205 lb 12.8 oz 225 lb 6.4 oz       Current Outpatient Prescriptions   Medication Sig Dispense Refill    metOLazone (ZAROXOLYN) 5 mg tablet Take 1 Tab by mouth two (2) times a day. 60 Tab 2    bumetanide (BUMEX) 2 mg tablet Take 1 Tab by mouth two (2) times a day. Indications: Edema 60 Tab 2    potassium chloride (K-DUR, KLOR-CON) 20 mEq tablet Take 1 Tab by mouth two (2) times a day. Indications: hypokalemia prevention 60 Tab 2    aspirin (CHILDREN'S ASPIRIN) 81 mg chewable tablet TAKE ONE TABLET BY MOUTH EVERY DAY 30 Tab 11    cholecalciferol (VITAMIN D3) 1,000 unit cap One tab daily 30 Cap 11    cyanocobalamin 2,500 mcg Subl 1 Tab by SubLINGual route daily.  30 Tab 6    electrolytes-dextrose (PEDIALYTE) soln solution 1-2 bottles daily (Patient not taking: Reported on 12/18/2017) 1 Bottle 4     No Known Allergies  Past Medical History:   Diagnosis Date    Elevated PSA, less than 10 ng/ml 12/10/2012    Hypertension     Hypokalemia 7/25/2014    Localized edema 2/7/2017    Vitamin D deficiency 7/8/2014    WBC decreased 7/6/2012     Past Surgical History:   Procedure Laterality Date    COLONOSCOPY,DIAGNOSTIC  2/5/2015         HX ORTHOPAEDIC left knee surg in  1984     Family History   Problem Relation Age of Onset    Kidney Disease Father      Social History   Substance Use Topics    Smoking status: Never Smoker    Smokeless tobacco: Never Used    Alcohol use No      Lab Results  Component Value Date/Time   WBC 3.8 03/21/2017 10:01 AM   HGB 14.3 03/21/2017 10:01 AM   HCT 42.2 03/21/2017 10:01 AM   PLATELET 083 19/53/9797 10:01 AM   MCV 86 03/21/2017 10:01 AM     Lab Results  Component Value Date/Time   GFR est non-AA 68 10/25/2017 02:12 PM   GFR est AA 78 10/25/2017 02:12 PM   Creatinine 1.10 10/25/2017 02:12 PM   BUN 8 10/25/2017 02:12 PM   Sodium 145 10/25/2017 02:12 PM   Potassium 4.5 10/25/2017 02:12 PM   Chloride 105 10/25/2017 02:12 PM   CO2 27 10/25/2017 02:12 PM   Magnesium 2.1 01/13/2016 09:03 AM        Review of Systems   Constitutional: Negative for chills and fever. HENT: Negative for ear pain and nosebleeds. Eyes: Negative for blurred vision, pain and discharge. Respiratory: Negative for shortness of breath. Cardiovascular: Negative for chest pain and leg swelling. Gastrointestinal: Negative for constipation, diarrhea, nausea and vomiting. Genitourinary: Negative for frequency. Musculoskeletal: Negative for joint pain. Skin: Negative for itching and rash. Neurological: Negative for headaches. Psychiatric/Behavioral: Negative for depression. The patient is not nervous/anxious. Physical Exam   Constitutional: He is oriented to person, place, and time. He appears well-developed and well-nourished. HENT:   Head: Normocephalic and atraumatic. Mouth/Throat: No oropharyngeal exudate. Eyes: Conjunctivae and EOM are normal.   Neck: Normal range of motion. Neck supple. Cardiovascular: Normal rate, regular rhythm and normal heart sounds. No murmur heard. Pulmonary/Chest: Effort normal and breath sounds normal. No respiratory distress. Abdominal: Soft.  Bowel sounds are normal. He exhibits no distension. There is no rebound. Musculoskeletal: He exhibits no edema or tenderness. Neurological: He is alert and oriented to person, place, and time. Skin: Skin is warm. No erythema. Psychiatric: He has a normal mood and affect. His behavior is normal.   Nursing note and vitals reviewed. 1+ on the left and nl on the rt LE    ASSESSMENT and PLAN  Diagnoses and all orders for this visit:    1. Localized edema  -     bumetanide (BUMEX) 2 mg tablet; Take 1 Tab by mouth two (2) times a day. Indications: Edema  -     potassium chloride (K-DUR, KLOR-CON) 20 mEq tablet; Take 1 Tab by mouth two (2) times a day. Indications: hypokalemia prevention  -     AMB SUPPLY ORDER  -     METABOLIC PANEL, BASIC    2. Encounter for immunization  -     bumetanide (BUMEX) 2 mg tablet; Take 1 Tab by mouth two (2) times a day. Indications: Edema  -     potassium chloride (K-DUR, KLOR-CON) 20 mEq tablet; Take 1 Tab by mouth two (2) times a day. Indications: hypokalemia prevention  -     AMB SUPPLY ORDER  -     METABOLIC PANEL, BASIC    3. Screening for glaucoma  -     bumetanide (BUMEX) 2 mg tablet; Take 1 Tab by mouth two (2) times a day. Indications: Edema  -     potassium chloride (K-DUR, KLOR-CON) 20 mEq tablet; Take 1 Tab by mouth two (2) times a day. Indications: hypokalemia prevention  -     AMB SUPPLY ORDER  -     METABOLIC PANEL, BASIC    Stimulus such as Foreplay, erotic movies, improve partner communication, reduce performance pressure, use of yohimbine encouraged, counseled pt on the fact that since ED is multifactorial, the need to continue with a continuous life style modification encouraged.   Will discont zaroxylyn bid now and cont with bumex and the support hose

## 2017-12-18 NOTE — MR AVS SNAPSHOT
Visit Information Date & Time Provider Department Dept. Phone Encounter #  
 12/18/2017 11:15 AM Raj Mccracken MD [facility] [phone] [id number] Follow-up Instructions Return in about 3 months (around 3/18/2018), or if symptoms worsen or fail to improve. Follow-up and Disposition History Upcoming Health Maintenance Date Due  
 GLAUCOMA SCREENING Q2Y 2/9/2012 MEDICARE YEARLY EXAM 9/14/2018 COLON CANCER SCRN (BARIUM / CT COLO / FLX SIG) Q5 2/5/2020 DTaP/Tdap/Td series (2 - Td) 7/8/2024 Allergies as of 12/18/2017  Review Complete On: 12/18/2017 By: Janie Horne LPN No Known Allergies Current Immunizations  Reviewed on 2/7/2017 Name Date Pneumococcal Conjugate (PCV-13) 7/13/2015  9:26 AM  
 Pneumococcal Polysaccharide (PPSV-23) 7/8/2014 Tdap 7/8/2014 Not reviewed this visit You Were Diagnosed With   
  
 Codes Comments Localized edema    -  Primary ICD-10-CM: R60.0 ICD-9-CM: 782.3 Encounter for immunization     ICD-10-CM: B04 ICD-9-CM: V03.89 Screening for glaucoma     ICD-10-CM: Z13.5 ICD-9-CM: V80.1 Vitals BP Pulse Temp Resp Height(growth percentile) Weight(growth percentile) 138/88 (BP 1 Location: Left arm, BP Patient Position: At rest) 80 98.6 °F (37 °C) (Oral) 14 6' (1.829 m) 199 lb 3.2 oz (90.4 kg) SpO2 BMI Smoking Status 98% 27.02 kg/m2 Never Smoker Vitals History BMI and BSA Data Body Mass Index Body Surface Area  
 27.02 kg/m 2 2.14 m 2 Preferred Pharmacy Pharmacy Name Phone [facility] [address], [address] [phone] Your Updated Medication List  
  
   
This list is accurate as of: 12/18/17 12:38 PM.  Always use your most recent med list.  
  
  
  
  
 aspirin 81 mg chewable tablet Commonly known as:  CHILDREN'S ASPIRIN  
TAKE ONE TABLET BY MOUTH EVERY DAY  
  
 bumetanide 2 mg tablet Commonly known as:  Zakia Achilles Take 1 Tab by mouth two (2) times a day. Indications: Edema  
  
 cholecalciferol 1,000 unit Cap Commonly known as:  VITAMIN D3 One tab daily  
  
 cyanocobalamin 2,500 mcg sublingual tablet Commonly known as:  VITAMIN B12  
1 Tab by SubLINGual route daily. electrolytes-dextrose Soln solution Commonly known as:  PEDIALYTE  
1-2 bottles daily  
  
 potassium chloride 20 mEq tablet Commonly known as:  K-DUR, KLOR-CON Take 1 Tab by mouth two (2) times a day. Indications: hypokalemia prevention We Performed the Following AMB SUPPLY ORDER [[id number] Custom] Comments:  
 Below the knees on the left side mostly to be worn daily and off nightly METABOLIC PANEL, BASIC [37593 CPT(R)] Follow-up Instructions Return in about 3 months (around 3/18/2018), or if symptoms worsen or fail to improve. Patient Instructions DASH Diet: Care Instructions Your Care Instructions The DASH diet is an eating plan that can help lower your blood pressure. DASH stands for Dietary Approaches to Stop Hypertension. Hypertension is high blood pressure. The DASH diet focuses on eating foods that are high in calcium, potassium, and magnesium. These nutrients can lower blood pressure. The foods that are highest in these nutrients are fruits, vegetables, low-fat dairy products, nuts, seeds, and legumes. But taking calcium, potassium, and magnesium supplements instead of eating foods that are high in those nutrients does not have the same effect. The DASH diet also includes whole grains, fish, and poultry. The DASH diet is one of several lifestyle changes your doctor may recommend to lower your high blood pressure. Your doctor may also want you to decrease the amount of sodium in your diet. Lowering sodium while following the DASH diet can lower blood pressure even further than just the DASH diet alone. Follow-up care is a key part of your treatment and safety. Be sure to make and go to all appointments, and call your doctor if you are having problems. It's also a good idea to know your test results and keep a list of the medicines you take. How can you care for yourself at home? Following the DASH diet · Eat 4 to 5 servings of fruit each day. A serving is 1 medium-sized piece of fruit, ½ cup chopped or canned fruit, 1/4 cup dried fruit, or 4 ounces (½ cup) of fruit juice. Choose fruit more often than fruit juice. · Eat 4 to 5 servings of vegetables each day. A serving is 1 cup of lettuce or raw leafy vegetables, ½ cup of chopped or cooked vegetables, or 4 ounces (½ cup) of vegetable juice. Choose vegetables more often than vegetable juice. · Get 2 to 3 servings of low-fat and fat-free dairy each day. A serving is 8 ounces of milk, 1 cup of yogurt, or 1 ½ ounces of cheese. · Eat 6 to 8 servings of grains each day. A serving is 1 slice of bread, 1 ounce of dry cereal, or ½ cup of cooked rice, pasta, or cooked cereal. Try to choose whole-grain products as much as possible. · Limit lean meat, poultry, and fish to 2 servings each day. A serving is 3 ounces, about the size of a deck of cards. · Eat 4 to 5 servings of nuts, seeds, and legumes (cooked dried beans, lentils, and split peas) each week. A serving is 1/3 cup of nuts, 2 tablespoons of seeds, or ½ cup of cooked beans or peas. · Limit fats and oils to 2 to 3 servings each day. A serving is 1 teaspoon of vegetable oil or 2 tablespoons of salad dressing. · Limit sweets and added sugars to 5 servings or less a week. A serving is 1 tablespoon jelly or jam, ½ cup sorbet, or 1 cup of lemonade. · Eat less than 2,300 milligrams (mg) of sodium a day. If you limit your sodium to 1,500 mg a day, you can lower your blood pressure even more. Tips for success · Start small.  Do not try to make dramatic changes to your diet all at once. You might feel that you are missing out on your favorite foods and then be more likely to not follow the plan. Make small changes, and stick with them. Once those changes become habit, add a few more changes. · Try some of the following: ¨ Make it a goal to eat a fruit or vegetable at every meal and at snacks. This will make it easy to get the recommended amount of fruits and vegetables each day. ¨ Try yogurt topped with fruit and nuts for a snack or healthy dessert. ¨ Add lettuce, tomato, cucumber, and onion to sandwiches. ¨ Combine a ready-made pizza crust with low-fat mozzarella cheese and lots of vegetable toppings. Try using tomatoes, squash, spinach, broccoli, carrots, cauliflower, and onions. ¨ Have a variety of cut-up vegetables with a low-fat dip as an appetizer instead of chips and dip. ¨ Sprinkle sunflower seeds or chopped almonds over salads. Or try adding chopped walnuts or almonds to cooked vegetables. ¨ Try some vegetarian meals using beans and peas. Add garbanzo or kidney beans to salads. Make burritos and tacos with mashed parker beans or black beans. Where can you learn more? Go to http://tiffany-clare.info/. Enter V596 in the search box to learn more about \"DASH Diet: Care Instructions. \" Current as of: September 21, 2016 Content Version: 11.4 © 3515-6792 [email or web address]. Care instructions adapted under license by [facility] (which disclaims liability or warranty for this information). If you have questions about a medical condition or this instruction, always ask your healthcare professional. [place] [address] any warranty or liability for your use of this information. Leg and Ankle Edema: Care Instructions Your Care Instructions Swelling in the legs, ankles, and feet is called edema. It is common after you sit or stand for a while.  Long plane flights or car rides often cause swelling in the legs and feet. You may also have swelling if you have to stand for long periods of time at your job. Problems with the veins in the legs (varicose veins) and changes in hormones can also cause swelling. Sometimes the swelling in the ankles and feet is caused by a more serious problem, such as heart failure, infection, blood clots, or liver or kidney disease. Follow-up care is a key part of your treatment and safety. Be sure to make and go to all appointments, and call your doctor if you are having problems. It's also a good idea to know your test results and keep a list of the medicines you take. How can you care for yourself at home? · If your doctor gave you medicine, take it as prescribed. Call your doctor if you think you are having a problem with your medicine. · Whenever you are resting, raise your legs up. Try to keep the swollen area higher than the level of your heart. · Take breaks from standing or sitting in one position. ¨ Walk around to increase the blood flow in your lower legs. ¨ Move your feet and ankles often while you stand, or tighten and relax your leg muscles. · Wear support stockings. Put them on in the morning, before swelling gets worse. · Eat a balanced diet. Lose weight if you need to. · Limit the amount of salt (sodium) in your diet. Salt holds fluid in the body and may increase swelling. When should you call for help? Call 911 anytime you think you may need emergency care. For example, call if: 
? · You have symptoms of a blood clot in your lung (called a pulmonary embolism). These may include: 
¨ Sudden chest pain. ¨ Trouble breathing. ¨ Coughing up blood. ?Call your doctor now or seek immediate medical care if: 
? · You have signs of a blood clot, such as: 
¨ Pain in your calf, back of the knee, thigh, or groin. ¨ Redness and swelling in your leg or groin. ? · You have symptoms of infection, such as: 
¨ Increased pain, swelling, warmth, or redness. ¨ Red streaks or pus. ¨ A fever. ? Watch closely for changes in your health, and be sure to contact your doctor if: 
? · Your swelling is getting worse. ? · You have new or worsening pain in your legs. ? · You do not get better as expected. Where can you learn more? Go to http://tiffany-clare.info/. Enter A365 in the search box to learn more about \"Leg and Ankle Edema: Care Instructions. \" Current as of: March 20, 2017 Content Version: 11.4 © 9235-9575 [employer]. Care instructions adapted under license by [employer] (which disclaims liability or warranty for this information). If you have questions about a medical condition or this instruction, always ask your healthcare professional. [address] any warranty or liability for your use of this information. Introducing [facility]! Brenda Christiansen introduces Inbil[employer] patient portal. Now you can access parts of your medical record, email your doctor's office, and request medication refills online. 1. In your internet browser, go to [email or web address] 2. Click on the First Time User? Click Here link in the Sign In box. You will see the New Member Sign Up page. 3. Enter your In[employer] Access Code exactly as it appears below. You will not need to use this code after youve completed the sign-up process. If you do not sign up before the expiration date, you must request a new code. · [employer] Access Code: [email or web address] Expires: 3/18/2018 12:38 PM 
 
4. Enter the last four digits of your Social Security Number (xxxx) and Date of Birth (mm/dd/yyyy) as indicated and click Submit. You will be taken to the next sign-up page. 5. Create a Inbilin ID. This will be your [employer] login ID and cannot be changed, so think of one that is secure and easy to remember. 6. Create a In[employer] password. You can change your password at any time. 7. Enter your Password Reset Question and Answer. This can be used at a later time if you forget your password. 8. Enter your e-mail address. You will receive e-mail notification when new information is available in [address] 9. Click Sign Up. You can now view and download portions of your medical record. 10. Click the Download Summary menu link to download a portable copy of your medical information. If you have questions, please visit the Frequently Asked Questions section of the [employer] website. Remember, [employer] is NOT to be used for urgent needs. For medical emergencies, dial 911. Now available from your iPhone and Android! Please provide this summary of care documentation to your next provider. Your primary care clinician is listed as Meme Pope. If you have any questions after today's visit, please call [phone].

## 2017-12-18 NOTE — PATIENT INSTRUCTIONS
DASH Diet: Care Instructions  Your Care Instructions    The DASH diet is an eating plan that can help lower your blood pressure. DASH stands for Dietary Approaches to Stop Hypertension. Hypertension is high blood pressure. The DASH diet focuses on eating foods that are high in calcium, potassium, and magnesium. These nutrients can lower blood pressure. The foods that are highest in these nutrients are fruits, vegetables, low-fat dairy products, nuts, seeds, and legumes. But taking calcium, potassium, and magnesium supplements instead of eating foods that are high in those nutrients does not have the same effect. The DASH diet also includes whole grains, fish, and poultry. The DASH diet is one of several lifestyle changes your doctor may recommend to lower your high blood pressure. Your doctor may also want you to decrease the amount of sodium in your diet. Lowering sodium while following the DASH diet can lower blood pressure even further than just the DASH diet alone. Follow-up care is a key part of your treatment and safety. Be sure to make and go to all appointments, and call your doctor if you are having problems. It's also a good idea to know your test results and keep a list of the medicines you take. How can you care for yourself at home? Following the DASH diet  · Eat 4 to 5 servings of fruit each day. A serving is 1 medium-sized piece of fruit, ½ cup chopped or canned fruit, 1/4 cup dried fruit, or 4 ounces (½ cup) of fruit juice. Choose fruit more often than fruit juice. · Eat 4 to 5 servings of vegetables each day. A serving is 1 cup of lettuce or raw leafy vegetables, ½ cup of chopped or cooked vegetables, or 4 ounces (½ cup) of vegetable juice. Choose vegetables more often than vegetable juice. · Get 2 to 3 servings of low-fat and fat-free dairy each day. A serving is 8 ounces of milk, 1 cup of yogurt, or 1 ½ ounces of cheese. · Eat 6 to 8 servings of grains each day.  A serving is 1 slice of bread, 1 ounce of dry cereal, or ½ cup of cooked rice, pasta, or cooked cereal. Try to choose whole-grain products as much as possible. · Limit lean meat, poultry, and fish to 2 servings each day. A serving is 3 ounces, about the size of a deck of cards. · Eat 4 to 5 servings of nuts, seeds, and legumes (cooked dried beans, lentils, and split peas) each week. A serving is 1/3 cup of nuts, 2 tablespoons of seeds, or ½ cup of cooked beans or peas. · Limit fats and oils to 2 to 3 servings each day. A serving is 1 teaspoon of vegetable oil or 2 tablespoons of salad dressing. · Limit sweets and added sugars to 5 servings or less a week. A serving is 1 tablespoon jelly or jam, ½ cup sorbet, or 1 cup of lemonade. · Eat less than 2,300 milligrams (mg) of sodium a day. If you limit your sodium to 1,500 mg a day, you can lower your blood pressure even more. Tips for success  · Start small. Do not try to make dramatic changes to your diet all at once. You might feel that you are missing out on your favorite foods and then be more likely to not follow the plan. Make small changes, and stick with them. Once those changes become habit, add a few more changes. · Try some of the following:  ¨ Make it a goal to eat a fruit or vegetable at every meal and at snacks. This will make it easy to get the recommended amount of fruits and vegetables each day. ¨ Try yogurt topped with fruit and nuts for a snack or healthy dessert. ¨ Add lettuce, tomato, cucumber, and onion to sandwiches. ¨ Combine a ready-made pizza crust with low-fat mozzarella cheese and lots of vegetable toppings. Try using tomatoes, squash, spinach, broccoli, carrots, cauliflower, and onions. ¨ Have a variety of cut-up vegetables with a low-fat dip as an appetizer instead of chips and dip. ¨ Sprinkle sunflower seeds or chopped almonds over salads. Or try adding chopped walnuts or almonds to cooked vegetables.   ¨ Try some vegetarian meals using beans and peas. Add garbanzo or kidney beans to salads. Make burritos and tacos with mashed parker beans or black beans. Where can you learn more? Go to http://tiffany-clare.info/. Enter L330 in the search box to learn more about \"DASH Diet: Care Instructions. \"  Current as of: September 21, 2016  Content Version: 11.4  © 9953-3471 [employer]. Care instructions adapted under license by [employer] (which disclaims liability or warranty for this information). If you have questions about a medical condition or this instruction, always ask your healthcare professional. [place] [address] any warranty or liability for your use of this information. Leg and Ankle Edema: Care Instructions  Your Care Instructions  Swelling in the legs, ankles, and feet is called edema. It is common after you sit or stand for a while. Long plane flights or car rides often cause swelling in the legs and feet. You may also have swelling if you have to stand for long periods of time at your job. Problems with the veins in the legs (varicose veins) and changes in hormones can also cause swelling. Sometimes the swelling in the ankles and feet is caused by a more serious problem, such as heart failure, infection, blood clots, or liver or kidney disease. Follow-up care is a key part of your treatment and safety. Be sure to make and go to all appointments, and call your doctor if you are having problems. It's also a good idea to know your test results and keep a list of the medicines you take. How can you care for yourself at home? · If your doctor gave you medicine, take it as prescribed. Call your doctor if you think you are having a problem with your medicine. · Whenever you are resting, raise your legs up. Try to keep the swollen area higher than the level of your heart. · Take breaks from standing or sitting in one position.   ¨ Walk around to increase the blood flow in your lower legs.  ¨ Move your feet and ankles often while you stand, or tighten and relax your leg muscles. · Wear support stockings. Put them on in the morning, before swelling gets worse. · Eat a balanced diet. Lose weight if you need to. · Limit the amount of salt (sodium) in your diet. Salt holds fluid in the body and may increase swelling. When should you call for help? Call 911 anytime you think you may need emergency care. For example, call if:  ? · You have symptoms of a blood clot in your lung (called a pulmonary embolism). These may include:  ¨ Sudden chest pain. ¨ Trouble breathing. ¨ Coughing up blood. ?Call your doctor now or seek immediate medical care if:  ? · You have signs of a blood clot, such as:  ¨ Pain in your calf, back of the knee, thigh, or groin. ¨ Redness and swelling in your leg or groin. ? · You have symptoms of infection, such as:  ¨ Increased pain, swelling, warmth, or redness. ¨ Red streaks or pus. ¨ A fever. ? Watch closely for changes in your health, and be sure to contact your doctor if:  ? · Your swelling is getting worse. ? · You have new or worsening pain in your legs. ? · You do not get better as expected. Where can you learn more? Go to http://tiffany-clare.info/. Enter H585 in the search box to learn more about \"Leg and Ankle Edema: Care Instructions. \"  Current as of: March 20, 2017  Content Version: 11.4  © 7200-1411 [employer]. Care instructions adapted under license by [employer] (which disclaims liability or warranty for this information). If you have questions about a medical condition or this instruction, always ask your healthcare professional. [place] [address] any warranty or liability for your use of this information.

## 2017-12-19 LAB
BUN SERPL-MCNC: 12 MG/DL (ref 8–27)
BUN/CREAT SERPL: 10 (ref 10–24)
CALCIUM SERPL-MCNC: 9.6 MG/DL (ref 8.6–10.2)
CHLORIDE SERPL-SCNC: 102 MMOL/L (ref 96–106)
CO2 SERPL-SCNC: 31 MMOL/L (ref 18–29)
CREAT SERPL-MCNC: 1.17 MG/DL (ref 0.76–1.27)
GFR SERPLBLD CREATININE-BSD FMLA CKD-EPI: 63 ML/MIN/1.73
GFR SERPLBLD CREATININE-BSD FMLA CKD-EPI: 73 ML/MIN/1.73
GLUCOSE SERPL-MCNC: 98 MG/DL (ref 65–99)
POTASSIUM SERPL-SCNC: 4.6 MMOL/L (ref 3.5–5.2)
SODIUM SERPL-SCNC: 146 MMOL/L (ref 134–144)

## 2018-03-19 ENCOUNTER — OFFICE VISIT (OUTPATIENT)
Dept: FAMILY MEDICINE CLINIC | Age: 73
End: 2018-03-19

## 2018-03-19 VITALS
DIASTOLIC BLOOD PRESSURE: 89 MMHG | WEIGHT: 211.6 LBS | TEMPERATURE: 96.6 F | BODY MASS INDEX: 28.66 KG/M2 | HEIGHT: 72 IN | HEART RATE: 78 BPM | OXYGEN SATURATION: 99 % | SYSTOLIC BLOOD PRESSURE: 153 MMHG | RESPIRATION RATE: 16 BRPM

## 2018-03-19 DIAGNOSIS — Z23 ENCOUNTER FOR IMMUNIZATION: ICD-10-CM

## 2018-03-19 DIAGNOSIS — Z13.5 SCREENING FOR GLAUCOMA: ICD-10-CM

## 2018-03-19 DIAGNOSIS — R60.0 LOCALIZED EDEMA: ICD-10-CM

## 2018-03-19 RX ORDER — POTASSIUM CHLORIDE 20 MEQ/1
20 TABLET, EXTENDED RELEASE ORAL DAILY
Qty: 30 TAB | Refills: 3 | Status: SHIPPED | OUTPATIENT
Start: 2018-03-19 | End: 2018-06-19 | Stop reason: SDUPTHER

## 2018-03-19 RX ORDER — FUROSEMIDE 20 MG/1
20 TABLET ORAL DAILY
Qty: 30 TAB | Refills: 3 | Status: SHIPPED | OUTPATIENT
Start: 2018-03-19 | End: 2018-06-19 | Stop reason: SDUPTHER

## 2018-03-19 NOTE — MR AVS SNAPSHOT
[address] [facility] [id number] 
[phone] Patient: Tracie Werner MRN: [id number] TVH:[id number] Visit Information Date & Time Provider Department Dept. Phone Encounter #  
 3/19/2018  8:45 AM Eunice Holly MD [address] [place] [address] OFFICE-ANNEX [phone] [id number] Follow-up Instructions Return in about 3 months (around 6/19/2018), or if symptoms worsen or fail to improve. Upcoming Health Maintenance Date Due  
 GLAUCOMA SCREENING Q2Y 2/9/2012 MEDICARE YEARLY EXAM 9/14/2018 COLON CANCER SCRN (BARIUM / CT COLO / FLX SIG) Q5 2/5/2020 DTaP/Tdap/Td series (2 - Td) 7/8/2024 Allergies as of 3/19/2018  Review Complete On: 3/19/2018 By: Eunice Holly MD  
 No Known Allergies Current Immunizations  Reviewed on 2/7/2017 Name Date Pneumococcal Conjugate (PCV-13) 7/13/2015  9:26 AM  
 Pneumococcal Polysaccharide (PPSV-23) 7/8/2014 Tdap 7/8/2014 Not reviewed this visit You Were Diagnosed With   
  
 Codes Comments Localized edema     ICD-10-CM: R60.0 ICD-9-CM: 782.3 Encounter for immunization     ICD-10-CM: I12 ICD-9-CM: V03.89 Screening for glaucoma     ICD-10-CM: Z13.5 ICD-9-CM: V80.1 Vitals BP Pulse Temp Resp Height(growth percentile) Weight(growth percentile) 153/89 (BP 1 Location: Left arm, BP Patient Position: Sitting) 78 96.6 °F (35.9 °C) (Oral) 16 6' (1.829 m) 211 lb 9.6 oz (96 kg) SpO2 BMI Smoking Status 99% 28.7 kg/m2 Never Smoker Vitals History BMI and BSA Data Body Mass Index Body Surface Area 28.7 kg/m 2 2.21 m 2 Preferred Pharmacy Pharmacy Name Phone [facility] [address] [place] [address] Dr Schumacher, [address] [phone] Your Updated Medication List  
  
   
This list is accurate as of 3/19/18  9:08 AM.  Always use your most recent med list.  
  
  
  
  
 aspirin 81 mg chewable tablet Commonly known as:  CHILDREN'S ASPIRIN  
TAKE ONE TABLET BY MOUTH EVERY DAY  
  
 cholecalciferol 1,000 unit Cap Commonly known as:  VITAMIN D3 One tab daily  
  
 cyanocobalamin 2,500 mcg sublingual tablet Commonly known as:  VITAMIN B12  
1 Tab by SubLINGual route daily. electrolytes-dextrose Soln solution Commonly known as:  PEDIALYTE  
1-2 bottles daily  
  
 furosemide 20 mg tablet Commonly known as:  LASIX Take 1 Tab by mouth daily. potassium chloride 20 mEq tablet Commonly known as:  K-DUR, KLOR-CON Take 1 Tab by mouth daily. Indications: hypokalemia prevention Prescriptions Sent to Pharmacy Refills  
 furosemide (LASIX) 20 mg tablet 3 Sig: Take 1 Tab by mouth daily. Class: Normal  
 Pharmacy: [address], [address] [place] [address] Ph #: [phone] Route: Oral  
 potassium chloride (K-DUR, KLOR-CON) 20 mEq tablet 3 Sig: Take 1 Tab by mouth daily. Indications: hypokalemia prevention Class: Normal  
 Pharmacy: [address], [address] [place] [address] Ph #: [phone] Route: Oral  
  
We Performed the Following REFERRAL TO OPHTHALMOLOGY [REF57 Custom] Follow-up Instructions Return in about 3 months (around 6/19/2018), or if symptoms worsen or fail to improve. Referral Information Referral ID Referred By Referred To  
  
 [id number] Sanya Nichols Not Available Visits Status Start Date End Date 1 New Request 3/19/18 3/19/19 If your referral has a status of pending review or denied, additional information will be sent to support the outcome of this decision. Patient Instructions Leg and Ankle Edema: Care Instructions Your Care Instructions Swelling in the legs, ankles, and feet is called edema. It is common after you sit or stand for a while.  Long plane flights or car rides often cause swelling in the legs and feet. You may also have swelling if you have to stand for long periods of time at your job. Problems with the veins in the legs (varicose veins) and changes in hormones can also cause swelling. Sometimes the swelling in the ankles and feet is caused by a more serious problem, such as heart failure, infection, blood clots, or liver or kidney disease. Follow-up care is a key part of your treatment and safety. Be sure to make and go to all appointments, and call your doctor if you are having problems. It's also a good idea to know your test results and keep a list of the medicines you take. How can you care for yourself at home? · If your doctor gave you medicine, take it as prescribed. Call your doctor if you think you are having a problem with your medicine. · Whenever you are resting, raise your legs up. Try to keep the swollen area higher than the level of your heart. · Take breaks from standing or sitting in one position. ¨ Walk around to increase the blood flow in your lower legs. ¨ Move your feet and ankles often while you stand, or tighten and relax your leg muscles. · Wear support stockings. Put them on in the morning, before swelling gets worse. · Eat a balanced diet. Lose weight if you need to. · Limit the amount of salt (sodium) in your diet. Salt holds fluid in the body and may increase swelling. When should you call for help? Call 911 anytime you think you may need emergency care. For example, call if: 
? · You have symptoms of a blood clot in your lung (called a pulmonary embolism). These may include: 
¨ Sudden chest pain. ¨ Trouble breathing. ¨ Coughing up blood. ?Call your doctor now or seek immediate medical care if: 
? · You have signs of a blood clot, such as: 
¨ Pain in your calf, back of the knee, thigh, or groin. ¨ Redness and swelling in your leg or groin. ? · You have symptoms of infection, such as: 
¨ Increased pain, swelling, warmth, or redness. ¨ Red streaks or pus. ¨ A fever. ? Watch closely for changes in your health, and be sure to contact your doctor if: 
? · Your swelling is getting worse. ? · You have new or worsening pain in your legs. ? · You do not get better as expected. Where can you learn more? Go to http://tiffany-clare.info/. Enter O073 in the search box to learn more about \"Leg and Ankle Edema: Care Instructions. \" Current as of: March 20, 2017 Content Version: 11.4 © 6923-8346 [employer]. Care instructions adapted under license by [employer] (which disclaims liability or warranty for this information). If you have questions about a medical condition or this instruction, always ask your healthcare professional. [address] any warranty or liability for your use of this information. Introducing [facility]! Jannette Cohen introduces [employer] patient portal. Now you can access parts of your medical record, email your doctor's office, and request medication refills online. 1. In your internet browser, go to [email or web address] 2. Click on the First Time User? Click Here link in the Sign In box. You will see the New Member Sign Up page. 3. Enter your [employer] Access Code exactly as it appears below. You will not need to use this code after youve completed the sign-up process. If you do not sign up before the expiration date, you must request a new code. · [employer] Access Code: [email or web address] Expires: 6/17/2018  8:46 AM 
 
4. Enter the last four digits of your Social Security Number (xxxx) and Date of Birth (mm/dd/yyyy) as indicated and click Submit. You will be taken to the next sign-up page. 5. Create a X[employer]Met ID. This will be your [employer] login ID and cannot be changed, so think of one that is secure and easy to remember. 6. Create a [employer] password. You can change your password at any time. 7. Enter your Password Reset Question and Answer. This can be used at a later time if you forget your password. 8. Enter your e-mail address. You will receive e-mail notification when new information is available in [address] 9. Click Sign Up. You can now view and download portions of your medical record. 10. Click the Download Summary menu link to download a portable copy of your medical information. If you have questions, please visit the Frequently Asked Questions section of the [employer] website. Remember, [employer] is NOT to be used for urgent needs. For medical emergencies, dial 911. Now available from your iPhone and Android! Please provide this summary of care documentation to your next provider. Your primary care clinician is listed as Rios Owens. If you have any questions after today's visit, please call [phone].

## 2018-03-19 NOTE — PATIENT INSTRUCTIONS
Leg and Ankle Edema: Care Instructions  Your Care Instructions  Swelling in the legs, ankles, and feet is called edema. It is common after you sit or stand for a while. Long plane flights or car rides often cause swelling in the legs and feet. You may also have swelling if you have to stand for long periods of time at your job. Problems with the veins in the legs (varicose veins) and changes in hormones can also cause swelling. Sometimes the swelling in the ankles and feet is caused by a more serious problem, such as heart failure, infection, blood clots, or liver or kidney disease. Follow-up care is a key part of your treatment and safety. Be sure to make and go to all appointments, and call your doctor if you are having problems. It's also a good idea to know your test results and keep a list of the medicines you take. How can you care for yourself at home? · If your doctor gave you medicine, take it as prescribed. Call your doctor if you think you are having a problem with your medicine. · Whenever you are resting, raise your legs up. Try to keep the swollen area higher than the level of your heart. · Take breaks from standing or sitting in one position. ¨ Walk around to increase the blood flow in your lower legs. ¨ Move your feet and ankles often while you stand, or tighten and relax your leg muscles. · Wear support stockings. Put them on in the morning, before swelling gets worse. · Eat a balanced diet. Lose weight if you need to. · Limit the amount of salt (sodium) in your diet. Salt holds fluid in the body and may increase swelling. When should you call for help? Call 911 anytime you think you may need emergency care. For example, call if:  ? · You have symptoms of a blood clot in your lung (called a pulmonary embolism). These may include:  ¨ Sudden chest pain. ¨ Trouble breathing. ¨ Coughing up blood.    ?Call your doctor now or seek immediate medical care if:  ? · You have signs of a blood clot, such as:  ¨ Pain in your calf, back of the knee, thigh, or groin. ¨ Redness and swelling in your leg or groin. ? · You have symptoms of infection, such as:  ¨ Increased pain, swelling, warmth, or redness. ¨ Red streaks or pus. ¨ A fever. ? Watch closely for changes in your health, and be sure to contact your doctor if:  ? · Your swelling is getting worse. ? · You have new or worsening pain in your legs. ? · You do not get better as expected. Where can you learn more? Go to http://tiffany-clare.info/. Enter C838 in the search box to learn more about \"Leg and Ankle Edema: Care Instructions. \"  Current as of: March 20, 2017  Content Version: 11.4  © 9614-5211 [employer]. Care instructions adapted under license by [employer] (which disclaims liability or warranty for this information). If you have questions about a medical condition or this instruction, always ask your healthcare professional. [place] [address] any warranty or liability for your use of this information.

## 2018-03-19 NOTE — PROGRESS NOTES
Chief Complaint   Patient presents with    Cholesterol Problem     follow up     1. Have you been to the ER, urgent care clinic since your last visit? Hospitalized since your last visit?no    2. Have you seen or consulted any other health care providers outside of the [address] [place] [address] since your last visit? Include any pap smears or colon screening.  no

## 2018-03-19 NOTE — PROGRESS NOTES
HISTORY OF PRESENT ILLNESS  Mariela Rios is a 72 y.o. male. HPI         HTN  Today pt present for Bp check and the patient stating that so far there hasnot been a Compliancy w/ the bp meds for the last 3 days, having had the low salt diet,  the patient has been active life style and does do the daily walking, pt states that patien doesnot obtain the bp at home    today the pt denies Chest Pain, has some legs swelling b/l improved compared to previous visit, but his wt up from 199 to 211,   no lightheadedness, the patient has not been feeling anxious, and  Has not been feeling stressed out,   otherwise feeling better since the last visit    Current Outpatient Prescriptions   Medication Sig Dispense Refill    bumetanide (BUMEX) 2 mg tablet Take 1 Tab by mouth two (2) times a day. Indications: Edema 60 Tab 2    potassium chloride (K-DUR, KLOR-CON) 20 mEq tablet Take 1 Tab by mouth two (2) times a day. Indications: hypokalemia prevention 60 Tab 2    cholecalciferol (VITAMIN D3) 1,000 unit cap One tab daily 30 Cap 11    cyanocobalamin 2,500 mcg Subl 1 Tab by SubLINGual route daily.  30 Tab 6    electrolytes-dextrose (PEDIALYTE) soln solution 1-2 bottles daily (Patient not taking: Reported on 12/18/2017) 1 Bottle 4    aspirin (CHILDREN'S ASPIRIN) 81 mg chewable tablet TAKE ONE TABLET BY MOUTH EVERY DAY 30 Tab 11     No Known Allergies  Past Medical History:   Diagnosis Date    Elevated PSA, less than 10 ng/ml 12/10/2012    Hypertension     Hypokalemia 7/25/2014    Localized edema 2/7/2017    Vitamin D deficiency 7/8/2014    WBC decreased 7/6/2012     Past Surgical History:   Procedure Laterality Date    COLONOSCOPY,DIAGNOSTIC  2/5/2015         HX ORTHOPAEDIC      left knee surg in  1984     Family History   Problem Relation Age of Onset    Kidney Disease Father      Social History   Substance Use Topics    Smoking status: Never Smoker    Smokeless tobacco: Never Used    Alcohol use No      Lab Results  Component Value Date/Time   WBC 3.8 03/21/2017 10:01 AM   HGB 14.3 03/21/2017 10:01 AM   HCT 42.2 03/21/2017 10:01 AM   PLATELET 688 (L) 37/36/1392 10:01 AM   MCV 86 03/21/2017 10:01 AM     Lab Results  Component Value Date/Time   GFR est non-AA 63 12/18/2017 12:51 PM   GFR est AA 73 12/18/2017 12:51 PM   Creatinine 1.17 12/18/2017 12:51 PM   BUN 12 12/18/2017 12:51 PM   Sodium 146 (H) 12/18/2017 12:51 PM   Potassium 4.6 12/18/2017 12:51 PM   Chloride 102 12/18/2017 12:51 PM   CO2 31 (H) 12/18/2017 12:51 PM   Magnesium 2.1 01/13/2016 09:03 AM        Review of Systems   Constitutional: Negative for chills, fever and malaise/fatigue. HENT: Negative for congestion and nosebleeds. Eyes: Negative for blurred vision and pain. Respiratory: Negative for shortness of breath and wheezing. Cardiovascular: Positive for leg swelling. Negative for chest pain and palpitations. Gastrointestinal: Negative for constipation, diarrhea, nausea and vomiting. Genitourinary: Negative for dysuria and frequency. Musculoskeletal: Negative for joint pain and myalgias. Skin: Negative for itching and rash. Neurological: Negative for dizziness, loss of consciousness and headaches. Endo/Heme/Allergies: Does not bruise/bleed easily. Psychiatric/Behavioral: Negative for depression. The patient is not nervous/anxious and does not have insomnia. All other systems reviewed and are negative. Physical Exam   Constitutional: He is oriented to person, place, and time. He appears well-developed and well-nourished. HENT:   Head: Normocephalic and atraumatic. Mouth/Throat: No oropharyngeal exudate. Eyes: Conjunctivae and EOM are normal. Pupils are equal, round, and reactive to light. Neck: Normal range of motion. Neck supple. No JVD present. No thyromegaly present. Cardiovascular: Normal rate, regular rhythm, normal heart sounds and intact distal pulses. Exam reveals no friction rub.     No murmur heard.  Pulmonary/Chest: Effort normal and breath sounds normal. No respiratory distress. He has no wheezes. He has no rales. Abdominal: Soft. Bowel sounds are normal. He exhibits no distension. There is no tenderness. Musculoskeletal: He exhibits edema. He exhibits no tenderness. +1   Lymphadenopathy:     He has no cervical adenopathy. Neurological: He is alert and oriented to person, place, and time. He has normal reflexes. Skin: Skin is warm. No rash noted. No erythema. Psychiatric: He has a normal mood and affect. His behavior is normal.   Nursing note and vitals reviewed. ASSESSMENT and PLAN  Diagnoses and all orders for this visit:    1. Localized edema  -     potassium chloride (K-DUR, KLOR-CON) 20 mEq tablet; Take 1 Tab by mouth daily. Indications: hypokalemia prevention    2. Encounter for immunization  -     potassium chloride (K-DUR, KLOR-CON) 20 mEq tablet; Take 1 Tab by mouth daily. Indications: hypokalemia prevention    3. Screening for glaucoma  -     potassium chloride (K-DUR, KLOR-CON) 20 mEq tablet; Take 1 Tab by mouth daily. Indications: hypokalemia prevention  -     REFERRAL TO OPHTHALMOLOGY    Other orders  -     furosemide (LASIX) 20 mg tablet; Take 1 Tab by mouth daily.       Leg elevation at all times or most of the times, support hoses b/l 24hrs per day, ambulation as possible, use of two pillows at nights while in bed

## 2018-06-19 ENCOUNTER — OFFICE VISIT (OUTPATIENT)
Dept: FAMILY MEDICINE CLINIC | Age: 73
End: 2018-06-19

## 2018-06-19 ENCOUNTER — HOSPITAL ENCOUNTER (OUTPATIENT)
Dept: LAB | Age: 73
Discharge: HOME | End: 2018-06-19
Payer: MEDICARE

## 2018-06-19 VITALS
WEIGHT: 210.4 LBS | DIASTOLIC BLOOD PRESSURE: 83 MMHG | HEART RATE: 81 BPM | RESPIRATION RATE: 16 BRPM | BODY MASS INDEX: 28.5 KG/M2 | OXYGEN SATURATION: 97 % | SYSTOLIC BLOOD PRESSURE: 130 MMHG | HEIGHT: 72 IN | TEMPERATURE: 98.7 F

## 2018-06-19 DIAGNOSIS — R60.0 LOCALIZED EDEMA: Primary | ICD-10-CM

## 2018-06-19 DIAGNOSIS — Z13.5 SCREENING FOR GLAUCOMA: ICD-10-CM

## 2018-06-19 PROCEDURE — 80053 COMPREHEN METABOLIC PANEL: CPT

## 2018-06-19 PROCEDURE — 36415 COLL VENOUS BLD VENIPUNCTURE: CPT

## 2018-06-19 PROCEDURE — 82607 VITAMIN B-12: CPT

## 2018-06-19 PROCEDURE — 85027 COMPLETE CBC AUTOMATED: CPT

## 2018-06-19 RX ORDER — POTASSIUM CHLORIDE 20 MEQ/1
20 TABLET, EXTENDED RELEASE ORAL DAILY
Qty: 90 TAB | Refills: 2 | Status: SHIPPED | OUTPATIENT
Start: 2018-06-19 | End: 2018-09-18 | Stop reason: SDUPTHER

## 2018-06-19 RX ORDER — FUROSEMIDE 20 MG/1
20 TABLET ORAL DAILY
Qty: 90 TAB | Refills: 2 | Status: SHIPPED | OUTPATIENT
Start: 2018-06-19 | End: 2018-09-18 | Stop reason: SDUPTHER

## 2018-06-19 NOTE — PATIENT INSTRUCTIONS
Learning About High Blood Pressure  What is high blood pressure? Blood pressure is a measure of how hard the blood pushes against the walls of your arteries. It's normal for blood pressure to go up and down throughout the day, but if it stays up, you have high blood pressure. Another name for high blood pressure is hypertension. Two numbers tell you your blood pressure. The first number is the systolic pressure. It shows how hard the blood pushes when your heart is pumping. The second number is the diastolic pressure. It shows how hard the blood pushes between heartbeats, when your heart is relaxed and filling with blood. A blood pressure of less than 120/80 (say \"120 over 80\") is ideal for an adult. High blood pressure is 140/90 or higher. You have high blood pressure if your top number is 140 or higher or your bottom number is 90 or higher, or both. Many people fall into the category in between, called prehypertension. People with prehypertension need to make lifestyle changes to bring their blood pressure down and help prevent or delay high blood pressure. What happens when you have high blood pressure? · Blood flows through your arteries with too much force. Over time, this damages the walls of your arteries. But you can't feel it. High blood pressure usually doesn't cause symptoms. · Fat and calcium start to build up in your arteries. This buildup is called plaque. Plaque makes your arteries narrower and stiffer. Blood can't flow through them as easily. · This lack of good blood flow starts to damage some of the organs in your body. This can lead to problems such as coronary artery disease and heart attack, heart failure, stroke, kidney failure, and eye damage. How can you prevent high blood pressure? · Stay at a healthy weight. · Try to limit how much sodium you eat to less than 2,300 milligrams (mg) a day.  If you limit your sodium to 1,500 mg a day, you can lower your blood pressure even more.  ¨ Buy foods that are labeled \"unsalted,\" \"sodium-free,\" or \"low-sodium. \" Foods labeled \"reduced-sodium\" and \"light sodium\" may still have too much sodium. ¨ Flavor your food with garlic, lemon juice, onion, vinegar, herbs, and spices instead of salt. Do not use soy sauce, steak sauce, onion salt, garlic salt, mustard, or ketchup on your food. ¨ Use less salt (or none) when recipes call for it. You can often use half the salt a recipe calls for without losing flavor. · Be physically active. Get at least 30 minutes of exercise on most days of the week. Walking is a good choice. You also may want to do other activities, such as running, swimming, cycling, or playing tennis or team sports. · Limit alcohol to 2 drinks a day for men and 1 drink a day for women. · Eat plenty of fruits, vegetables, and low-fat dairy products. Eat less saturated and total fats. How is high blood pressure treated? · Your doctor will suggest making lifestyle changes. For example, your doctor may ask you to eat healthy foods, quit smoking, lose extra weight, and be more active. · If lifestyle changes don't help enough or your blood pressure is very high, you will have to take medicine every day. Follow-up care is a key part of your treatment and safety. Be sure to make and go to all appointments, and call your doctor if you are having problems. It's also a good idea to know your test results and keep a list of the medicines you take. Where can you learn more? Go to http://tiffany-clare.info/. Enter P501 in the search box to learn more about \"Learning About High Blood Pressure. \"  Current as of: September 21, 2016  Content Version: 11.4  © 1593-6457 [employer]. Care instructions adapted under license by [employer] (which disclaims liability or warranty for this information).  If you have questions about a medical condition or this instruction, always ask your healthcare professional. [employer], [facility] disclaims any warranty or liability for your use of this information. Body Mass Index: Care Instructions  Your Care Instructions    Body mass index (BMI) can help you see if your weight is raising your risk for health problems. It uses a formula to compare how much you weigh with how tall you are. · A BMI lower than 18.5 is considered underweight. · A BMI between 18.5 and 24.9 is considered healthy. · A BMI between 25 and 29.9 is considered overweight. A BMI of 30 or higher is considered obese. If your BMI is in the normal range, it means that you have a lower risk for weight-related health problems. If your BMI is in the overweight or obese range, you may be at increased risk for weight-related health problems, such as high blood pressure, heart disease, stroke, arthritis or joint pain, and diabetes. If your BMI is in the underweight range, you may be at increased risk for health problems such as fatigue, lower protection (immunity) against illness, muscle loss, bone loss, hair loss, and hormone problems. BMI is just one measure of your risk for weight-related health problems. You may be at higher risk for health problems if you are not active, you eat an unhealthy diet, or you drink too much alcohol or use tobacco products. Follow-up care is a key part of your treatment and safety. Be sure to make and go to all appointments, and call your doctor if you are having problems. It's also a good idea to know your test results and keep a list of the medicines you take. How can you care for yourself at home? · Practice healthy eating habits. This includes eating plenty of fruits, vegetables, whole grains, lean protein, and low-fat dairy. · If your doctor recommends it, get more exercise. Walking is a good choice. Bit by bit, increase the amount you walk every day. Try for at least 30 minutes on most days of the week. · Do not smoke.  Smoking can increase your risk for health problems. If you need help quitting, talk to your doctor about stop-smoking programs and medicines. These can increase your chances of quitting for good. · Limit alcohol to 2 drinks a day for men and 1 drink a day for women. Too much alcohol can cause health problems. If you have a BMI higher than 25  · Your doctor may do other tests to check your risk for weight-related health problems. This may include measuring the distance around your waist. A waist measurement of more than 40 inches in men or 35 inches in women can increase the risk of weight-related health problems. · Talk with your doctor about steps you can take to stay healthy or improve your health. You may need to make lifestyle changes to lose weight and stay healthy, such as changing your diet and getting regular exercise. If you have a BMI lower than 18.5  · Your doctor may do other tests to check your risk for health problems. · Talk with your doctor about steps you can take to stay healthy or improve your health. You may need to make lifestyle changes to gain or maintain weight and stay healthy, such as getting more healthy foods in your diet and doing exercises to build muscle. Where can you learn more? Go to http://tiffanyDigidentityclare.info/. Enter S176 in the search box to learn more about \"Body Mass Index: Care Instructions. \"  Current as of: October 13, 2016  Content Version: 11.4  © 9421-5954 [employer]. Care instructions adapted under lic     Leg and Ankle Edema: Care Instructions  Your Care Instructions  Swelling in the legs, ankles, and feet is called edema. It is common after you sit or stand for a while. Long plane flights or car rides often cause swelling in the legs and feet. You may also have swelling if you have to stand for long periods of time at your job. Problems with the veins in the legs (varicose veins) and changes in hormones can also cause swelling.  Sometimes the swelling in the ankles and feet is caused by a more serious problem, such as heart failure, infection, blood clots, or liver or kidney disease. Follow-up care is a key part of your treatment and safety. Be sure to make and go to all appointments, and call your doctor if you are having problems. It's also a good idea to know your test results and keep a list of the medicines you take. How can you care for yourself at home? · If your doctor gave you medicine, take it as prescribed. Call your doctor if you think you are having a problem with your medicine. · Whenever you are resting, raise your legs up. Try to keep the swollen area higher than the level of your heart. · Take breaks from standing or sitting in one position. ¨ Walk around to increase the blood flow in your lower legs. ¨ Move your feet and ankles often while you stand, or tighten and relax your leg muscles. · Wear support stockings. Put them on in the morning, before swelling gets worse. · Eat a balanced diet. Lose weight if you need to. · Limit the amount of salt (sodium) in your diet. Salt holds fluid in the body and may increase swelling. When should you call for help? Call 911 anytime you think you may need emergency care. For example, call if:  ? · You have symptoms of a blood clot in your lung (called a pulmonary embolism). These may include:  ¨ Sudden chest pain. ¨ Trouble breathing. ¨ Coughing up blood. ?Call your doctor now or seek immediate medical care if:  ? · You have signs of a blood clot, such as:  ¨ Pain in your calf, back of the knee, thigh, or groin. ¨ Redness and swelling in your leg or groin. ? · You have symptoms of infection, such as:  ¨ Increased pain, swelling, warmth, or redness. ¨ Red streaks or pus. ¨ A fever. ? Watch closely for changes in your health, and be sure to contact your doctor if:  ? · Your swelling is getting worse. ? · You have new or worsening pain in your legs. ? · You do not get better as expected.    Where can you learn more? Go to http://tiffany-clare.info/. Enter E276 in the search box to learn more about \"Leg and Ankle Edema: Care Instructions. \"  Current as of: March 20, 2017  Content Version: 11.4  © 6435-4255 [employer]. Care instructions adapted under license by [employer] (which disclaims liability or warranty for this information). If you have questions about a medical condition or this instruction, always ask your healthcare professional. [address] any warranty or liability for your use of this information. [employer] by [employer] (which disclaims liability or warranty for this information). If you have questions about a medical condition or this instruction, always ask your healthcare professional. [address] any warranty or liability for your use of this information.

## 2018-06-19 NOTE — PROGRESS NOTES
HISTORY OF PRESENT ILLNESS  Adrienne Rushing is a 72 y.o. male. HPI   Patient present with b/lSwelling of the lower ext,     Edema  Patient complains of edema. The location of the edema is lower leg(s) bilateral, ankle(s) bilateral, feet bilateral.  The edema has been moderate. Onset of symptoms was a couple of yrs ago, not worsening since that time. The edema is present all day. The patient states the problem is long-standing. The swelling has been aggravated by dependency of involved area, no increased in salt intake, currently better but not relieved by diuretics, pt has no support stockings, elevation of involved area does not help, not been associated with shortness of breath,      Vitals 6/19/2018 3/19/2018 12/18/2017 10/25/2017   Weight 210 lb 6.4 oz 211 lb 9.6 oz 199 lb 3.2 oz 205 lb 12.8 oz     Vitals 9/13/2017   Weight 225 lb 6.4 oz          Current Outpatient Prescriptions   Medication Sig Dispense Refill    furosemide (LASIX) 20 mg tablet Take 1 Tab by mouth daily. 30 Tab 3    potassium chloride (K-DUR, KLOR-CON) 20 mEq tablet Take 1 Tab by mouth daily. Indications: hypokalemia prevention 30 Tab 3    aspirin (CHILDREN'S ASPIRIN) 81 mg chewable tablet TAKE ONE TABLET BY MOUTH EVERY DAY 30 Tab 11    cholecalciferol (VITAMIN D3) 1,000 unit cap One tab daily 30 Cap 11    cyanocobalamin 2,500 mcg Subl 1 Tab by SubLINGual route daily.  30 Tab 6     No Known Allergies  Past Medical History:   Diagnosis Date    Elevated PSA, less than 10 ng/ml 12/10/2012    Hypertension     Hypokalemia 7/25/2014    Localized edema 2/7/2017    Vitamin D deficiency 7/8/2014    WBC decreased 7/6/2012     Past Surgical History:   Procedure Laterality Date    COLONOSCOPY,DIAGNOSTIC  2/5/2015         HX ORTHOPAEDIC      left knee surg in  1984     Family History   Problem Relation Age of Onset    Kidney Disease Father      Social History   Substance Use Topics    Smoking status: Never Smoker    Smokeless tobacco: Never Used    Alcohol use No      Lab Results  Component Value Date/Time   WBC 3.8 03/21/2017 10:01 AM   HGB 14.3 03/21/2017 10:01 AM   HCT 42.2 03/21/2017 10:01 AM   PLATELET 859 (L) 13/11/4119 10:01 AM   MCV 86 03/21/2017 10:01 AM     Lab Results  Component Value Date/Time   GFR est non-AA 63 12/18/2017 12:51 PM   GFR est AA 73 12/18/2017 12:51 PM   Creatinine 1.17 12/18/2017 12:51 PM   BUN 12 12/18/2017 12:51 PM   Sodium 146 (H) 12/18/2017 12:51 PM   Potassium 4.6 12/18/2017 12:51 PM   Chloride 102 12/18/2017 12:51 PM   CO2 31 (H) 12/18/2017 12:51 PM   Magnesium 2.1 01/13/2016 09:03 AM        Review of Systems   Constitutional: Negative for chills and fever. HENT: Negative for ear pain and nosebleeds. Eyes: Negative for blurred vision, pain and discharge. Respiratory: Negative for shortness of breath. Cardiovascular: Negative for chest pain and leg swelling. Gastrointestinal: Negative for constipation, diarrhea, nausea and vomiting. Genitourinary: Negative for frequency. Musculoskeletal: Negative for joint pain. Skin: Negative for itching and rash. Neurological: Negative for headaches. Psychiatric/Behavioral: Negative for depression. The patient is not nervous/anxious. Physical Exam   Constitutional: He is oriented to person, place, and time. He appears well-developed and well-nourished. HENT:   Head: Normocephalic and atraumatic. Mouth/Throat: No oropharyngeal exudate. Eyes: Conjunctivae and EOM are normal.   Neck: Normal range of motion. Neck supple. Cardiovascular: Normal rate, regular rhythm and normal heart sounds. No murmur heard. Pulmonary/Chest: Effort normal and breath sounds normal. No respiratory distress. Abdominal: Soft. Bowel sounds are normal. He exhibits no distension. There is no rebound. Musculoskeletal: He exhibits edema. He exhibits no tenderness. Neurological: He is alert and oriented to person, place, and time. Skin: Skin is warm. No erythema. Psychiatric: He has a normal mood and affect. His behavior is normal.   Nursing note and vitals reviewed. ASSESSMENT and PLAN  Diagnoses and all orders for this visit:    1. Localized edema  -     furosemide (LASIX) 20 mg tablet; Take 1 Tab by mouth daily. -     potassium chloride (K-DUR, KLOR-CON) 20 mEq tablet; Take 1 Tab by mouth daily. Indications: hypokalemia prevention  -     CBC W/O DIFF  -     METABOLIC PANEL, COMPREHENSIVE  -     VITAMIN B12 & FOLATE    2.  Screening for glaucoma  -     REFERRAL TO OPTOMETRY      Leg elevation at all times or most of the times, support hoses b/l 24hrs per day, ambulation as possible, use of two pillows at nights while in bed

## 2018-06-19 NOTE — MR AVS SNAPSHOT
[address] [facility] [address] [id number] [phone] Patient: Natasha Glover MRN: [id number] WSQ:[birth year]21 Visit Information Date & Time Provider Department Dept. Phone Encounter #  
 6/19/2018  9:30 AM Monroe Foy MD [address] [facility] OFFICE-ANNEX [phone] [id number] Follow-up Instructions Return in about 3 months (around 9/19/2018), or if symptoms worsen or fail to improve. Follow-up and Disposition History Upcoming Health Maintenance Date Due  
 GLAUCOMA SCREENING Q2Y 2/9/2012 Influenza Age 5 to Adult 8/1/2018 MEDICARE YEARLY EXAM 9/14/2018 COLON CANCER SCRN (BARIUM / CT COLO / FLX SIG) Q5 2/5/2020 DTaP/Tdap/Td series (2 - Td) 7/8/2024 Allergies as of 6/19/2018  Review Complete On: 6/19/2018 By: Diana Dykes LPN No Known Allergies Current Immunizations  Reviewed on 2/7/2017 Name Date Pneumococcal Conjugate (PCV-13) 7/13/2015  9:26 AM  
 Pneumococcal Polysaccharide (PPSV-23) 7/8/2014 Tdap 7/8/2014 Not reviewed this visit You Were Diagnosed With   
  
 Codes Comments Localized edema    -  Primary ICD-10-CM: R60.0 ICD-9-CM: 872. 3 Screening for glaucoma     ICD-10-CM: Z13.5 ICD-9-CM: V80.1 Vitals BP Pulse Temp Resp Height(growth percentile) Weight(growth percentile) 130/83 (BP 1 Location: Left arm, BP Patient Position: At rest) 81 98.7 °F (37.1 °C) (Oral) 16 6' (1.829 m) 210 lb 6.4 oz (95.4 kg) SpO2 BMI Smoking Status 97% 28.54 kg/m2 Never Smoker Vitals History BMI and BSA Data Body Mass Index Body Surface Area 28.54 kg/m 2 2.2 m 2 Preferred Pharmacy Pharmacy Name Phone [facility] [address] [place] [address] Dr Schumacher, [address] [phone] Your Updated Medication List  
  
   
This list is accurate as of 6/19/18 10:05 AM.  Always use your most recent med list.  
  
  
  
  
 aspirin 81 mg chewable tablet Commonly known as:  CHILDREN'S ASPIRIN  
TAKE ONE TABLET BY MOUTH EVERY DAY  
  
 cholecalciferol 1,000 unit Cap Commonly known as:  VITAMIN D3 One tab daily  
  
 cyanocobalamin 2,500 mcg sublingual tablet Commonly known as:  VITAMIN B12  
1 Tab by SubLINGual route daily. furosemide 20 mg tablet Commonly known as:  LASIX Take 1 Tab by mouth daily. potassium chloride 20 mEq tablet Commonly known as:  K-DUR, KLOR-CON Take 1 Tab by mouth daily. Indications: hypokalemia prevention Prescriptions Sent to Pharmacy Refills  
 furosemide (LASIX) 20 mg tablet 2 Sig: Take 1 Tab by mouth daily. Class: Normal  
 Pharmacy: [facility] DR NAYELI ALMARAZ NUPELON [address] Ph #: [phone] Route: Oral  
 potassium chloride (K-DUR, KLOR-CON) 20 mEq tablet 2 Sig: Take 1 Tab by mouth daily. Indications: hypokalemia prevention Class: Normal  
 Pharm[facility]: [facility] [address] Ph #: [phone] Route: Oral  
  
We Performed the Following CBC W/O DIFF [77369 CPT(R)] METABOLIC PANEL, COMPREHENSIVE [28646 CPT(R)] REFERRAL TO OPTOMETRY [id number] Custom] Comments:  
 Please evaluate patient for glaucoma. VITAMIN B12 & FOLATE [88258 CPT(R)] Follow-up Instructions Return in about 3 months (around 9/19/2018), or if symptoms worsen or fail to improve. Referral Information Referral ID Referred By Referred To  
  
 [id number] RIGOBERTO BLACKWELL MD   
   [facility] Wilfredo Cruz [place], [address] Phone: [phone] Fax: [phone] Visits Status Start Date End Date 1 New Request 6/19/18 6/19/19 If your referral has a status of pending review or denied, additional information will be sent to support the outcome of this decision. Patient Instructions Learning About High Blood Pressure What is high blood pressure? Blood pressure is a measure of how hard the blood pushes against the walls of your arteries. It's normal for blood pressure to go up and down throughout the day, but if it stays up, you have high blood pressure. Another name for high blood pressure is hypertension. Two numbers tell you your blood pressure. The first number is the systolic pressure. It shows how hard the blood pushes when your heart is pumping. The second number is the diastolic pressure. It shows how hard the blood pushes between heartbeats, when your heart is relaxed and filling with blood. A blood pressure of less than 120/80 (say \"120 over 80\") is ideal for an adult. High blood pressure is 140/90 or higher. You have high blood pressure if your top number is 140 or higher or your bottom number is 90 or higher, or both. Many people fall into the category in between, called prehypertension. People with prehypertension need to make lifestyle changes to bring their blood pressure down and help prevent or delay high blood pressure. What happens when you have high blood pressure? · Blood flows through your arteries with too much force. Over time, this damages the walls of your arteries. But you can't feel it. High blood pressure usually doesn't cause symptoms. · Fat and calcium start to build up in your arteries. This buildup is called plaque. Plaque makes your arteries narrower and stiffer. Blood can't flow through them as easily. · This lack of good blood flow starts to damage some of the organs in your body. This can lead to problems such as coronary artery disease and heart attack, heart failure, stroke, kidney failure, and eye damage. How can you prevent high blood pressure? · Stay at a healthy weight. · Try to limit how much sodium you eat to less than 2,300 milligrams (mg) a day. If you limit your sodium to 1,500 mg a day, you can lower your blood pressure even more. ¨ Buy foods that are labeled \"unsalted,\" \"sodium-free,\" or \"low-sodium. \" Foods labeled \"reduced-sodium\" and \"light sodium\" may still have too much sodium. ¨ Flavor your food with garlic, lemon juice, onion, vinegar, herbs, and spices instead of salt. Do not use soy sauce, steak sauce, onion salt, garlic salt, mustard, or ketchup on your food. ¨ Use less salt (or none) when recipes call for it. You can often use half the salt a recipe calls for without losing flavor. · Be physically active. Get at least 30 minutes of exercise on most days of the week. Walking is a good choice. You also may want to do other activities, such as running, swimming, cycling, or playing tennis or team sports. · Limit alcohol to 2 drinks a day for men and 1 drink a day for women. · Eat plenty of fruits, vegetables, and low-fat dairy products. Eat less saturated and total fats. How is high blood pressure treated? · Your doctor will suggest making lifestyle changes. For example, your doctor may ask you to eat healthy foods, quit smoking, lose extra weight, and be more active. · If lifestyle changes don't help enough or your blood pressure is very high, you will have to take medicine every day. Follow-up care is a key part of your treatment and safety. Be sure to make and go to all appointments, and call your doctor if you are having problems. It's also a good idea to know your test results and keep a list of the medicines you take. Where can you learn more? Go to http://tiffany-clare.info/. Enter P501 in the search box to learn more about \"Learning About High Blood Pressure. \" Current as of: September 21, 2016 Content Version: 11.4 © 3495-9383 [employer]. Care instructions adapted under license by [employer] (which disclaims liability or warranty for this information).  If you have questions about a medical condition or this instruction, always ask your healthcare professional. [address] any warranty or liability for your use of this information. Body Mass Index: Care Instructions Your Care Instructions Body mass index (BMI) can help you see if your weight is raising your risk for health problems. It uses a formula to compare how much you weigh with how tall you are. · A BMI lower than 18.5 is considered underweight. · A BMI between 18.5 and 24.9 is considered healthy. · A BMI between 25 and 29.9 is considered overweight. A BMI of 30 or higher is considered obese. If your BMI is in the normal range, it means that you have a lower risk for weight-related health problems. If your BMI is in the overweight or obese range, you may be at increased risk for weight-related health problems, such as high blood pressure, heart disease, stroke, arthritis or joint pain, and diabetes. If your BMI is in the underweight range, you may be at increased risk for health problems such as fatigue, lower protection (immunity) against illness, muscle loss, bone loss, hair loss, and hormone problems. BMI is just one measure of your risk for weight-related health problems. You may be at higher risk for health problems if you are not active, you eat an unhealthy diet, or you drink too much alcohol or use tobacco products. Follow-up care is a key part of your treatment and safety. Be sure to make and go to all appointments, and call your doctor if you are having problems. It's also a good idea to know your test results and keep a list of the medicines you take. How can you care for yourself at home? · Practice healthy eating habits. This includes eating plenty of fruits, vegetables, whole grains, lean protein, and low-fat dairy. · If your doctor recommends it, get more exercise. Walking is a good choice. Bit by bit, increase the amount you walk every day. Try for at least 30 minutes on most days of the week. · Do not smoke. Smoking can increase your risk for health problems. If you need help quitting, talk to your doctor about stop-smoking programs and medicines. These can increase your chances of quitting for good. · Limit alcohol to 2 drinks a day for men and 1 drink a day for women. Too much alcohol can cause health problems. If you have a BMI higher than 25 · Your doctor may do other tests to check your risk for weight-related health problems. This may include measuring the distance around your waist. A waist measurement of more than 40 inches in men or 35 inches in women can increase the risk of weight-related health problems. · Talk with your doctor about steps you can take to stay healthy or improve your health. You may need to make lifestyle changes to lose weight and stay healthy, such as changing your diet and getting regular exercise. If you have a BMI lower than 18.5 · Your doctor may do other tests to check your risk for health problems. · Talk with your doctor about steps you can take to stay healthy or improve your health. You may need to make lifestyle changes to gain or maintain weight and stay healthy, such as getting more healthy foods in your diet and doing exercises to build muscle. Where can you learn more? Go to http://tiffanyJauntclare.info/. Enter S176 in the search box to learn more about \"Body Mass Index: Care Instructions. \" Current as of: October 13, 2016 Content Version: 11.4 © 4379-9952 Healthwise[employer] Incorporated. Care instructions adapted under lic Leg and Ankle Edema: Care Instructions Your Care Instructions Swelling in the legs, ankles, and feet is called edema. It is common after you sit or stand for a while. Long plane flights or car rides often cause swelling in the legs and feet. You may also have swelling if you have to stand for long periods of time at your job.  Problems with the veins in the legs (varicose veins) and changes in hormones can also cause swelling. Sometimes the swelling in the ankles and feet is caused by a more serious problem, such as heart failure, infection, blood clots, or liver or kidney disease. Follow-up care is a key part of your treatment and safety. Be sure to make and go to all appointments, and call your doctor if you are having problems. It's also a good idea to know your test results and keep a list of the medicines you take. How can you care for yourself at home? · If your doctor gave you medicine, take it as prescribed. Call your doctor if you think you are having a problem with your medicine. · Whenever you are resting, raise your legs up. Try to keep the swollen area higher than the level of your heart. · Take breaks from standing or sitting in one position. ¨ Walk around to increase the blood flow in your lower legs. ¨ Move your feet and ankles often while you stand, or tighten and relax your leg muscles. · Wear support stockings. Put them on in the morning, before swelling gets worse. · Eat a balanced diet. Lose weight if you need to. · Limit the amount of salt (sodium) in your diet. Salt holds fluid in the body and may increase swelling. When should you call for help? Call 911 anytime you think you may need emergency care. For example, call if: 
? · You have symptoms of a blood clot in your lung (called a pulmonary embolism). These may include: 
¨ Sudden chest pain. ¨ Trouble breathing. ¨ Coughing up blood. ?Call your doctor now or seek immediate medical care if: 
? · You have signs of a blood clot, such as: 
¨ Pain in your calf, back of the knee, thigh, or groin. ¨ Redness and swelling in your leg or groin. ? · You have symptoms of infection, such as: 
¨ Increased pain, swelling, warmth, or redness. ¨ Red streaks or pus. ¨ A fever. ? Watch closely for changes in your health, and be sure to contact your doctor if: ? · Your swelling is getting worse. ? · You have new or worsening pain in your legs. ? · You do not get better as expected. Where can you learn more? Go to http://tiffany-clare.info/. Enter Y398 in the search box to learn more about \"Leg and Ankle Edema: Care Instructions. \" Current as of: March 20, 2017 Content Version: 11.4 © 0101-1380 [employer]o. Care instructions adapted under license by [employer] (which disclaims liability or warranty for this information). If you have questions about a medical condition or this instruction, always ask your healthcare professional. [employer] any warranty or liability for your use of this information. ense by [employer] (which disclaims liability or warranty for this information). If you have questions about a medical condition or this instruction, always ask your healthcare professional. [employer] any warranty or liability for your use of this information. Patient Instructions History Introducing [facility]! John Hein introduces Bolt.io patient portal. Now you can access parts of your medical record, email your doctor's office, and request medication refills online. 1. In your internet browser, go to [email or web address] 2. Click on the First Time User? Click Here link in the Sign In box. You will see the New Member Sign Up page. 3. Enter your Bolt.io Access Code exactly as it appears below. You will not need to use this code after youve completed the sign-up process. If you do not sign up before the expiration date, you must request a new code. · [email or web address] Access Code: [email or web address] Expires: 9/17/2018 10:05 AM 
 
4. Enter the last four digits of your Social Security Number (xxxx) and Date of Birth (mm/dd/yyyy) as indicated and click Submit. You will be taken to the next sign-up page. 5. Create a 1o1Media ID. This will be your 1o1Media login ID and cannot be changed, so think of one that is secure and easy to remember. 6. Create a 1o1Media password. You can change your password at any time. 7. Enter your Password Reset Question and Answer. This can be used at a later time if you forget your password. 8. Enter your e-mail address. You will receive e-mail notification when new information is available in [address] 9. Click Sign Up. You can now view and download portions of your medical record. 10. Click the Download Summary menu link to download a portable copy of your medical information. If you have questions, please visit the Frequently Asked Questions section of the 1o1Media website. Remember, 1o1Media is NOT to be used for urgent needs. For medical emergencies, dial 911. Now available from your iPhone and Android! Please provide this summary of care documentation to your next provider. Your primary care clinician is listed as Sandy Eugene. If you have any questions after today's visit, please call [phone].

## 2018-06-20 LAB
ALBUMIN SERPL-MCNC: 4.3 G/DL (ref 3.5–4.8)
ALBUMIN/GLOB SERPL: 2 {RATIO} (ref 1.2–2.2)
ALP SERPL-CCNC: 92 IU/L (ref 39–117)
ALT SERPL-CCNC: 14 IU/L (ref 0–44)
AST SERPL-CCNC: 22 IU/L (ref 0–40)
BILIRUB SERPL-MCNC: 0.3 MG/DL (ref 0–1.2)
BUN SERPL-MCNC: 10 MG/DL (ref 8–27)
BUN/CREAT SERPL: 10 (ref 10–24)
CALCIUM SERPL-MCNC: 9.3 MG/DL (ref 8.6–10.2)
CHLORIDE SERPL-SCNC: 106 MMOL/L (ref 96–106)
CO2 SERPL-SCNC: 25 MMOL/L (ref 20–29)
CREAT SERPL-MCNC: 1.02 MG/DL (ref 0.76–1.27)
ERYTHROCYTE [DISTWIDTH] IN BLOOD BY AUTOMATED COUNT: 15.2 % (ref 12.3–15.4)
FOLATE SERPL-MCNC: 6.9 NG/ML
GFR SERPLBLD CREATININE-BSD FMLA CKD-EPI: 74 ML/MIN/1.73
GFR SERPLBLD CREATININE-BSD FMLA CKD-EPI: 85 ML/MIN/1.73
GLOBULIN SER CALC-MCNC: 2.2 G/DL (ref 1.5–4.5)
GLUCOSE SERPL-MCNC: 97 MG/DL (ref 65–99)
HCT VFR BLD AUTO: 42.5 % (ref 37.5–51)
HGB BLD-MCNC: 13.8 G/DL (ref 13–17.7)
MCH RBC QN AUTO: 27.9 PG (ref 26.6–33)
MCHC RBC AUTO-ENTMCNC: 32.5 G/DL (ref 31.5–35.7)
MCV RBC AUTO: 86 FL (ref 79–97)
PLATELET # BLD AUTO: 137 X10E3/UL (ref 150–379)
POTASSIUM SERPL-SCNC: 4 MMOL/L (ref 3.5–5.2)
PROT SERPL-MCNC: 6.5 G/DL (ref 6–8.5)
RBC # BLD AUTO: 4.95 X10E6/UL (ref 4.14–5.8)
SODIUM SERPL-SCNC: 142 MMOL/L (ref 134–144)
VIT B12 SERPL-MCNC: 272 PG/ML (ref 232–1245)
WBC # BLD AUTO: 3.7 X10E3/UL (ref 3.4–10.8)

## 2018-09-18 ENCOUNTER — OFFICE VISIT (OUTPATIENT)
Dept: FAMILY MEDICINE CLINIC | Age: 73
End: 2018-09-18

## 2018-09-18 ENCOUNTER — HOSPITAL ENCOUNTER (OUTPATIENT)
Dept: LAB | Age: 73
Discharge: HOME | End: 2018-09-18
Payer: MEDICARE

## 2018-09-18 VITALS
TEMPERATURE: 98 F | OXYGEN SATURATION: 98 % | WEIGHT: 209.2 LBS | HEIGHT: 72 IN | DIASTOLIC BLOOD PRESSURE: 89 MMHG | HEART RATE: 71 BPM | RESPIRATION RATE: 16 BRPM | BODY MASS INDEX: 28.33 KG/M2 | SYSTOLIC BLOOD PRESSURE: 138 MMHG

## 2018-09-18 DIAGNOSIS — Z13.31 SCREENING FOR DEPRESSION: ICD-10-CM

## 2018-09-18 DIAGNOSIS — E53.8 B12 DEFICIENCY: ICD-10-CM

## 2018-09-18 DIAGNOSIS — Z12.11 SCREEN FOR COLON CANCER: ICD-10-CM

## 2018-09-18 DIAGNOSIS — Z71.89 ADVANCED DIRECTIVES, COUNSELING/DISCUSSION: ICD-10-CM

## 2018-09-18 DIAGNOSIS — I10 HYPERTENSION, UNSPECIFIED TYPE: ICD-10-CM

## 2018-09-18 DIAGNOSIS — Z13.39 SCREENING FOR ALCOHOLISM: ICD-10-CM

## 2018-09-18 DIAGNOSIS — R60.0 LOCALIZED EDEMA: ICD-10-CM

## 2018-09-18 DIAGNOSIS — Z00.00 ENCOUNTER FOR MEDICARE ANNUAL WELLNESS EXAM: Primary | ICD-10-CM

## 2018-09-18 DIAGNOSIS — Z13.5 SCREENING FOR GLAUCOMA: ICD-10-CM

## 2018-09-18 PROBLEM — Z78.9 PATIENT IS FULL CODE: Status: ACTIVE | Noted: 2018-09-18

## 2018-09-18 PROCEDURE — 82607 VITAMIN B-12: CPT

## 2018-09-18 PROCEDURE — 80053 COMPREHEN METABOLIC PANEL: CPT

## 2018-09-18 PROCEDURE — 84443 ASSAY THYROID STIM HORMONE: CPT

## 2018-09-18 PROCEDURE — 36415 COLL VENOUS BLD VENIPUNCTURE: CPT

## 2018-09-18 PROCEDURE — 85027 COMPLETE CBC AUTOMATED: CPT

## 2018-09-18 RX ORDER — POTASSIUM CHLORIDE 20 MEQ/1
20 TABLET, EXTENDED RELEASE ORAL DAILY
Qty: 90 TAB | Refills: 0 | Status: SHIPPED | OUTPATIENT
Start: 2018-09-18 | End: 2018-12-18 | Stop reason: SDUPTHER

## 2018-09-18 RX ORDER — FUROSEMIDE 40 MG/1
40 TABLET ORAL DAILY
Qty: 90 TAB | Refills: 0 | Status: SHIPPED | OUTPATIENT
Start: 2018-09-18 | End: 2018-12-18 | Stop reason: SDUPTHER

## 2018-09-18 NOTE — ACP (ADVANCE CARE PLANNING)
Advance Care Planning      Other Legally Authorized Decision Maker would be friend Margie Oscar  as SDM     For My patients who has currently great Decision Making Capacity:     Patient is on no presence of family member stated that he wants to be not DNR at this time,  he likes to be a full code individual,  Pt was given the form, he will sign and bring us a copy on the later date.

## 2018-09-18 NOTE — PROGRESS NOTES
This is the Subsequent Medicare Annual Wellness Exam, performed 12 months or more after the Initial AWV or the last Subsequent AWV I have reviewed the patient's medical history in detail and updated the computerized patient record. History Present for CPE, last Complete Physical exam was 2017,  Up todate w/ all vaccination, last tetanus vaccine was in <6yrs ago  .  
Ella Collins 
   
Last psa exam was normal and was in 2017   
last colonoscopy was normal and was in , fit neg in 2017, No past surgical hx, 
last bone dexa scan was never 
   
No family hx of breast cancer in a male, 
no family hx of prostate cancer  
no family hx of colon cancer, father  of old age mother still livinghealthy, +++sexaully active and uses Safe sex, +++physically active, 
compliant w/ meds, ++Rf needed for today for his meds. No fall no etoh intake not feeling depressed nl interest to do things, Last flu shot made him sick, this yr does refuse to get one, 
 
Ran out of his fluid bp meds with his k supplement requesting a refill felt better with meds, today pitting Edema is at 2++ Vitals 2018 2018 3/19/2018 2017 Weight 209 lb 3.2 oz 210 lb 6.4 oz 211 lb 9.6 oz 199 lb 3.2 oz Vitals 10/25/2017 2017 Weight 205 lb 12.8 oz 225 lb 6.4 oz Past Medical History:  
Diagnosis Date  Elevated PSA, less than 10 ng/ml 12/10/2012  Hypertension  Hypokalemia 2014  Localized edema 2017  Vitamin D deficiency 2014  WBC decreased 2012 Past Surgical History:  
Procedure Laterality Date  COLONOSCOPY,DIAGNOSTIC  2015  HX ORTHOPAEDIC    
 left knee surg in   Current Outpatient Prescriptions Medication Sig Dispense Refill  furosemide (LASIX) 20 mg tablet Take 1 Tab by mouth daily. 90 Tab 2  potassium chloride (K-DUR, KLOR-CON) 20 mEq tablet Take 1 Tab by mouth daily.  Indications: hypokalemia prevention 90 Tab 2  
  aspirin (CHILDREN'S ASPIRIN) 81 mg chewable tablet TAKE ONE TABLET BY MOUTH EVERY DAY 30 Tab 11  
 cholecalciferol (VITAMIN D3) 1,000 unit cap One tab daily 30 Cap 11  
 cyanocobalamin 2,500 mcg Subl 1 Tab by SubLINGual route daily. 30 Tab 6 No Known Allergies Family History Problem Relation Age of Onset  Kidney Disease Father Social History Substance Use Topics  Smoking status: Never Smoker  Smokeless tobacco: Never Used  Alcohol use No  
 
Patient Active Problem List  
Diagnosis Code  
 HTN (hypertension) I10  
 B12 deficiency E53.8  Pre-diabetes R73.03  
 WBC decreased D72.819  
 Screening for colon cancer Z12.11  
 Elevated PSA, less than 10 ng/ml R97.20  Hypercholesteremia E78.00  Platelets decreased ([address] D69.6  Vitamin D deficiency E55.9  Hypokalemia E87.6  Localized edema R60.0 Depression Risk Factor Screening: PHQ over the last two weeks 6/19/2018 Little interest or pleasure in doing things Not at all Feeling down, depressed, irritable, or hopeless Not at all Total Score PHQ 2 0 Alcohol Risk Factor Screening: You do not drink alcohol or very rarely. Functional Ability and Level of Safety:  
Hearing Loss Hearing is good. Activities of Daily Living The home contains: handrails, grab bars and rugs Patient does total self care Fall Risk Fall Risk Assessment, last 12 mths 6/19/2018 Able to walk? Yes Fall in past 12 months? No  
 
 
Abuse Screen Patient is not abused Cognitive Screening Evaluation of Cognitive Function: 
Has your family/caregiver stated any concerns about your memory: no 
Normal 
 
Patient Care Team  
Patient Care Team: 
Neal Cain MD as PCP - General (Family Practice) Assessment/Plan Education and counseling provided: 
Are appropriate based on today's review and evaluation End-of-Life planning (with patient's consent) Pneumococcal Vaccine Diagnoses and all orders for this visit: 
 
 1. Encounter for Medicare annual wellness exam 
 
2. Advanced directives, counseling/discussion -     ADVANCE CARE PLANNING FIRST 30 MINS 3. Screening for glaucoma 
-     REFERRAL TO OPTOMETRY 4. Screening for alcoholism -     Annual  Alcohol Screen 15 min () 5. Screening for depression -     Depression Screen Annual 
 
6. Screen for colon cancer -     OCCULT BLOOD, IMMUNOASSAY (FIT) (70325) 7. Localized edema 
-     furosemide (LASIX) 40 mg tablet; Take 1 Tab by mouth daily. -     potassium chloride (K-DUR, KLOR-CON) 20 mEq tablet; Take 1 Tab by mouth daily. Indications: hypokalemia prevention 
-     CBC W/O DIFF 8. B12 deficiency 9. Hypertension, unspecified type 
-     furosemide (LASIX) 40 mg tablet; Take 1 Tab by mouth daily. -     potassium chloride (K-DUR, KLOR-CON) 20 mEq tablet; Take 1 Tab by mouth daily. Indications: hypokalemia prevention 
-     CBC W/O DIFF 
-     METABOLIC PANEL, COMPREHENSIVE 
-     TSH 3RD GENERATION 
-     VITAMIN B12 & FOLATE 
 
 
[facility] education and counseling provided: 
Age appropriate evidence-based preventive care recommendations based on today's review and evaluation; including relevant cancer screening guidelines, and vaccination recommendations. An After Visit Summary was printed and given to the patient which information about these guidelines, and a personalized schedule for health maintenance items. Whe appropriate and with patient agreement, orders noted below were placed to complete missing health maintenance items. Health Maintenance Due Topic Date Due  GLAUCOMA SCREENING Q2Y  02/09/2012  MEDICARE YEARLY EXAM  09/14/2018

## 2018-09-18 NOTE — PROGRESS NOTES
Name and  verified Chief Complaint Patient presents with  Ankle swelling Left ankle (3 month follow up) Yana Annual Wellness Visit Advance Directives Care Planning Information given, patient acknowledged understanding. Health Maintenance reviewed-discussed with patient. 1. Have you been to the ER, urgent care clinic since your last visit? Hospitalized since your last visit? No 
 
2. Have you seen or consulted any other health care providers outside of the [address] [place] [address] since your last visit? Include any pap smears or colon screening. No 
 
 
Patient stated last eye exam about two years ago.

## 2018-09-18 NOTE — MR AVS SNAPSHOT
[facility] [id number] [phone] Patient: Sami Flores MRN: [id number] HDO:[birth year] Visit Information Date & Time Provider Department Dept. Phone Encounter #  
 9/18/2018  9:45 AM Isabela Barrera MD [facility] OFFICE-ANNEX [phone] [id number] Follow-up Instructions Return in about 3 months (around 12/18/2018), or if symptoms worsen or fail to improve. Follow-up and Disposition History Upcoming Health Maintenance Date Due  
 GLAUCOMA SCREENING Q2Y 2/9/2012 MEDICARE YEARLY EXAM 9/14/2018 Influenza Age 5 to Adult 12/3/2018* COLON CANCER SCRN (BARIUM / CT COLO / FLX SIG) Q5 2/5/2020 DTaP/Tdap/Td series (2 - Td) 7/8/2024 *Topic was postponed. The date shown is not the original due date. Allergies as of 9/18/2018  Review Complete On: 9/18/2018 By: Leonardo Ndiaye LPN No Known Allergies Current Immunizations  Reviewed on 2/7/2017 Name Date Pneumococcal Conjugate (PCV-13) 7/13/2015  9:26 AM  
 Pneumococcal Polysaccharide (PPSV-23) 7/8/2014 Tdap 7/8/2014 Not reviewed this visit You Were Diagnosed With   
  
 Codes Comments Encounter for Medicare annual wellness exam    -  Primary ICD-10-CM: Z00.00 ICD-9-CM: V70.0 Advanced directives, counseling/discussion     ICD-10-CM: Z71.89 ICD-9-CM: V65.49 Screening for glaucoma     ICD-10-CM: Z13.5 ICD-9-CM: V80.1 Screening for alcoholism     ICD-10-CM: Z13.89 ICD-9-CM: V79.1 Screening for depression     ICD-10-CM: Z13.89 ICD-9-CM: V79.0 Screen for colon cancer     ICD-10-CM: Z12.11 ICD-9-CM: V76.51 Localized edema     ICD-10-CM: R60.0 ICD-9-CM: 782.3 B12 deficiency     ICD-10-CM: E53.8 ICD-9-CM: 266.2 Hypertension, unspecified type     ICD-10-CM: I10 
ICD-9-CM: 401.9 Vitals BP Pulse Temp Resp Height(growth percentile) Weight(growth percentile) 138/89 (BP 1 Location: Left arm, BP Patient Position: At rest) 71 98 °F (36.7 °C) (Oral) 16 6' (1.829 m) 209 lb 3.2 oz (94.9 kg) SpO2 BMI Smoking Status 98% 28.37 kg/m2 Never Smoker BMI and BSA Data Body Mass Index Body Surface Area  
 28.37 kg/m 2 2.2 m 2 Preferred Pharmacy Pharmacy Name Phone [facility] [address] [place] [address] [phone] Your Updated Medication List  
  
   
This list is accurate as of 9/18/18 10:32 AM.  Always use your most recent med list.  
  
  
  
  
 aspirin 81 mg chewable tablet Commonly known as:  CHILDREN'S ASPIRIN  
TAKE ONE TABLET BY MOUTH EVERY DAY  
  
 cholecalciferol 1,000 unit Cap Commonly known as:  VITAMIN D3 One tab daily  
  
 cyanocobalamin 2,500 mcg sublingual tablet Commonly known as:  VITAMIN B12  
1 Tab by SubLINGual route daily. furosemide 40 mg tablet Commonly known as:  LASIX Take 1 Tab by mouth daily. potassium chloride 20 mEq tablet Commonly known as:  K-DUR, KLOR-CON Take 1 Tab by mouth daily. Indications: hypokalemia prevention Prescriptions Sent to Pharmacy Refills  
 furosemide (LASIX) 40 mg tablet 0 Sig: Take 1 Tab by mouth daily. Class: Normal  
 Pharmacy: [facility] [address] [facility] [address] [place] [address] Ph #: [phone] Route: Oral  
 potassium chloride (K-DUR, KLOR-CON) 20 mEq tablet 0 Sig: Take 1 Tab by mouth daily. Indications: hypokalemia prevention Class: Normal  
 Pharmacy: [facility] [address] [facility] [address] [place] [address] Ph #: [phone] Route: Oral  
  
We Performed the Following ADVANCE CARE PLANNING FIRST 30 MINS [08443 CPT(R)] CBC W/O DIFF [96345 CPT(R)] [address] [[id number] [facility]] METABOLIC PANEL, COMPREHENSIVE [67342 CPT(R)] OCCULT BLOOD IMMUNOASSAY,DIAGNOSTIC [08714 CPT(R)] [place] ANNUAL ALCOHOL SCREEN 15 MIN [id number] [facility]] REFERRAL TO OPTOMETRY [id number] Custom] Comments:  
 Please evaluate patient for glaucoma. TSH 3RD GENERATION [32826 CPT(R)] VITAMIN B12 & FOLATE [35938 CPT(R)] Follow-up Instructions Return in about 3 months (around 12/18/2018), or if symptoms worsen or fail to improve. Referral Information Referral ID Referred By Referred To  
  
 [id number] RIGOBERTO BLACKWELL MD   
   [address], [address] Phone: [phone] Fax: [phone] Visits Status Start Date End Date 1 New Request 9/18/18 9/18/19 If your referral has a status of pending review or denied, additional information will be sent to support the outcome of this decision. Patient Instructions Advance Directives: Care Instructions Your Care Instructions An advance directive is a legal way to state your wishes at the end of your life. It tells your family and your doctor what to do if you can no longer say what you want. There are two main types of advance directives. You can change them any time that your wishes change. · A living will tells your family and your doctor your wishes about life support and other treatment. · A durable power of  for health care lets you name a person to make treatment decisions for you when you can't speak for yourself. This person is called a health care agent. If you do not have an advance directive, decisions about your medical care may be made by a doctor or a  who doesn't know you. It may help to think of an advance directive as a gift to the people who care for you. If you have one, they won't have to make tough decisions by themselves. Follow-up care is a key part of your treatment and safety. Be sure to make and go to all appointments, and call your doctor if you are having problems. It's also a good idea to know your test results and keep a list of the medicines you take. How can you care for yourself at home? · Discuss your wishes with your loved ones and your doctor. This way, there are no surprises. · Many states have a unique form. Or you might use a universal form that has been approved by many states. This kind of form can sometimes be completed and stored online. Your electronic copy will then be available wherever you have a connection to the Internet. In most cases, doctors will respect your wishes even if you have a form from a different state. · You don't need a  to do an advance directive. But you may want to get legal advice. · Think about these questions when you prepare an advance directive: ¨ Who do you want to make decisions about your medical care if you are not able to? Many people choose a family member or close friend. ¨ Do you know enough about life support methods that might be used? If not, talk to your doctor so you understand. ¨ What are you most afraid of that might happen? You might be afraid of having pain, losing your independence, or being kept alive by machines. ¨ Where would you prefer to die? Choices include your home, a hospital, or a nursing home. ¨ Would you like to have information about hospice care to support you and your family? ¨ Do you want to donate organs when you die? ¨ Do you want certain Spiritism practices performed before you die? If so, put your wishes in the advance directive. · Read your advance directive every year, and make changes as needed. When should you call for help? Be sure to contact your doctor if you have any questions. Where can you learn more? Go to http://tiffany-clare.info/. Enter R264 in the search box to learn more about \"Advance Directives: Care Instructions. \" Current as of: October 6, 2017 Content Version: 11.7 © 8910-6335 Healthwise, Incorporated.  Care instructions adapted under license by [employer] (which disclaims liability or warranty for this information). If you have questions about a medical condition or this instruction, always ask your healthcare professional. [place] [address] any warranty or liability for your use of this information. Medicare Wellness Visit, Male The best way to live healthy is to have a lifestyle where you eat a well-balanced diet, exercise regularly, limit alcohol use, and quit all forms of tobacco/nicotine, if applicable. Regular preventive services are another way to keep healthy. Preventive services (vaccines, screening tests, monitoring & exams) can help personalize your care plan, which helps you manage your own care. Screening tests can find health problems at the earliest stages, when they are easiest to treat. [address] follows the current, evidence-based guidelines published by the [facility] ([facility]) when recommending preventive services for our patients. Because we follow these guidelines, sometimes recommendations change over time as research supports it. (For example, a prostate screening blood test is no longer routinely recommended for men with no symptoms.) Of course, you and your doctor may decide to screen more often for some diseases, based on your risk and co-morbidities (chronic disease you are already diagnosed with). Preventive services for you include: - Medicare offers their members a free annual wellness visit, which is time for you and your primary care provider to discuss and plan for your preventive service needs. Take advantage of this benefit every year! 
-All adults over age 72 should receive the recommended pneumonia vaccines. Current USPSTF guidelines recommend a series of two vaccines for the best pneumonia protection.  
-All adults should have a flu vaccine yearly and an ECG. All adults age 61 and older should receive a shingles vaccine once in their lifetime. -All adults age 38-68 who are overweight should have a diabetes screening test once every three years.  
-Other screening tests & preventive services for persons with diabetes include: an eye exam to screen for diabetic retinopathy, a kidney function test, a foot exam, and stricter control over your cholesterol.  
-Cardiovascular screening for adults with routine risk involves an electrocardiogram (ECG) at intervals determined by the provider.  
-Colorectal cancer screening should be done for adults age 54-65 with no increased risk factors for colorectal cancer. There are a number of acceptable methods of screening for this type of cancer. Each test has its own benefits and drawbacks. Discuss with your provider what is most appropriate for you during your annual wellness visit. The different tests include: colonoscopy (considered the best screening method), a fecal occult blood test, a fecal DNA test, and sigmoidoscopy. 
-All adults born between [facility] should be screened once for Hepatitis C. 
-An Abdominal Aortic Aneurysm (AAA) Screening is recommended for men age 73-68 who has ever smoked in their lifetime. Here is a list of your current Health Maintenance items (your personalized list of preventive services) with a due date: 
Health Maintenance Due Topic Date Due  Glaucoma Screening   02/09/2012 [facility] Annual Well Visit  09/14/2018 High Cholesterol: Care Instructions Your Care Instructions Cholesterol is a type of fat in your blood. It is needed for many body functions, such as making new cells. Cholesterol is made by your body. It also comes from food you eat. High cholesterol means that you have too much of the fat in your blood. This raises your risk of a heart attack and stroke. LDL and HDL are part of your total cholesterol. LDL is the \"bad\" cholesterol.  High LDL can raise your risk for heart disease, heart attack, and stroke. HDL is the \"good\" cholesterol. It helps clear bad cholesterol from the body. High HDL is linked with a lower risk of heart disease, heart attack, and stroke. Your cholesterol levels help your doctor find out your risk for having a heart attack or stroke. You and your doctor can talk about whether you need to lower your risk and what treatment is best for you. A heart-healthy lifestyle along with medicines can help lower your cholesterol and your risk. The way you choose to lower your risk will depend on how high your risk is for heart attack and stroke. It will also depend on how you feel about taking medicines. Follow-up care is a key part of your treatment and safety. Be sure to make and go to all appointments, and call your doctor if you are having problems. It's also a good idea to know your test results and keep a list of the medicines you take. How can you care for yourself at home? · Eat a variety of foods every day. Good choices include fruits, vegetables, whole grains (like oatmeal), dried beans and peas, nuts and seeds, soy products (like tofu), and fat-free or low-fat dairy products. · Replace butter, margarine, and hydrogenated or partially hydrogenated oils with olive and canola oils. (Canola oil margarine without trans fat is fine.) · Replace red meat with fish, poultry, and soy protein (like tofu). · Limit processed and packaged foods like chips, crackers, and cookies. · Bake, broil, or steam foods. Don't jarrell them. · Be physically active. Get at least 30 minutes of exercise on most days of the week. Walking is a good choice. You also may want to do other activities, such as running, swimming, cycling, or playing tennis or team sports. · Stay at a healthy weight or lose weight by making the changes in eating and physical activity listed above. Losing just a small amount of weight, even 5 to 10 pounds, can reduce your risk for having a heart attack or stroke. · Do not smoke. When should you call for help? Watch closely for changes in your health, and be sure to contact your doctor if: 
  · You need help making lifestyle changes.  
  · You have questions about your medicine. Where can you learn more? Go to http://tiffany-clare.info/. Enter B442 in the search box to learn more about \"High Cholesterol: Care Instructions. \" Current as of: May 10, 2017 Content Version: 11.7 © 1012-8323 [employer]XE. Care instructions adapted under license by [employer] (which disclaims liability or warranty for this information). If you have questions about a medical condition or this instruction, always ask your healthcare professional. [place] [address] any warranty or liability for your use of this information. Advance Care Planning: Care Instructions Your Care Instructions It can be hard to live with an illness that cannot be cured. But if your health is getting worse, you may want to make decisions about end-of-life care. Planning for the end of your life does not mean that you are giving up. It is a way to make sure that your wishes are met. Clearly stating your wishes can make it easier for your loved ones. Making plans while you are still able may also ease your mind and make your final days less stressful and more meaningful. Follow-up care is a key part of your treatment and safety. Be sure to make and go to all appointments, and call your doctor if you are having problems. It's also a good idea to know your test results and keep a list of the medicines you take. What can you do to plan for the end of life? · You can bring these issues up with your doctor. You do not need to wait until your doctor starts the conversation. You might start with \"I would not be willing to live with . Cathy Valencia \" When you complete this sentence it helps your doctor understand your wishes. · Talk openly and honestly with your doctor. This is the best way to understand the decisions you will need to make as your health changes. Know that you can always change your mind. · Ask your doctor about commonly used life-support measures. These include tube feedings, breathing machines, and fluids given through a vein (IV). Understanding these treatments will help you decide whether you want them. · You may choose to have these life-supporting treatments for a limited time. This allows a trial period to see whether they will help you. You may also decide that you want your doctor to take only certain measures to keep you alive. It is important to spell out these conditions so that your doctor and family understand them. · Talk to your doctor about how long you are likely to live. He or she may be able to give you an idea of what usually happens with your specific illness. · Think about preparing papers that state your wishes. This way there will not be any confusion about what you want. You can change your instructions at any time. Which papers should you prepare? Advance directives are legal papers that tell doctors how you want to be cared for at the end of your life. You do not need a  to write these papers. Ask your doctor or your state [facility] department for information on how to write your advance directives. They may have the forms for each of these types of papers. Make sure your doctor has a copy of these on file, and give a copy to a family member or close friend. · Consider a do-not-resuscitate order (DNR). This order asks that no extra treatments be done if your heart stops or you stop breathing. Extra treatments may include cardiopulmonary resuscitation (CPR), electrical shock to restart your heart, or a machine to breathe for you. If you decide to have a DNR order, ask your doctor to explain and write it. Place the order in your home where everyone can easily see it. · Consider a living will. A living will explains your wishes about life support and other treatments at the end of your life if you become unable to speak for yourself. Living chicas tell doctors to use or not use treatments that would keep you alive. You must have one or two witnesses or a notary present when you sign this form. · Consider a durable power of  for health care. This allows you to name a person to make decisions about your care if you are not able to. Most people ask a close friend or family member. Talk to this person about the kinds of treatments you want and those that you do not want. Make sure this person understands your wishes. These legal papers are simple to change. Tell your doctor what you want to change, and ask him or her to make a note in your medical file. Give your family updated copies of the papers. Where can you learn more? Go to http://tiffany-clare.info/. Enter P184 in the search box to learn more about \"Advance Care Planning: Care Instructions. \" Current as of: November 17, 2016 Content Version: 11.3 © 2056-7557 Helpmycash. Care instructions adapted under license by [employer] (which disclaims liability or warranty for this information). If you have questions about a medical condition or this instruction, always ask your healthcare professional. [address] any warranty or liability for your use of this information. Patient Instructions History Introducing [facility]! Alta Deshpande introduces iJento patient portal. Now you can access parts of your medical record, email your doctor's office, and request medication refills online. 1. In your internet browser, go to [email or web address] 2. Click on the First Time User? Click Here link in the Sign In box. You will see the New Member Sign Up page. 3. Enter your LaunchKey Access Code exactly as it appears below. You will not need to use this code after youve completed the sign-up process. If you do not sign up before the expiration date, you must request a new code. · LaunchKey Access Code: [email or web address] Expires: 12/17/2018 10:32 AM 
 
4. Enter the last four digits of your Social Security Number (xxxx) and Date of Birth (mm/dd/yyyy) as indicated and click Submit. You will be taken to the next sign-up page. 5. Create a LaunchKey ID. This will be your LaunchKey login ID and cannot be changed, so think of one that is secure and easy to remember. 6. Create a LaunchKey password. You can change your password at any time. 7. Enter your Password Reset Question and Answer. This can be used at a later time if you forget your password. 8. Enter your e-mail address. You will receive e-mail notification when new information is available in [address] 9. Click Sign Up. You can now view and download portions of your medical record. 10. Click the Download Summary menu link to download a portable copy of your medical information. If you have questions, please visit the Frequently Asked Questions section of the LaunchKey website. Remember, LaunchKey is NOT to be used for urgent needs. For medical emergencies, dial 911. Now available from your iPhone and Android! Please provide this summary of care documentation to your next provider. Your primary care clinician is listed as Lubna Johnson. If you have any questions after today's visit, please call [phone].

## 2018-09-18 NOTE — PATIENT INSTRUCTIONS
Advance Directives: Care Instructions Your Care Instructions An advance directive is a legal way to state your wishes at the end of your life. It tells your family and your doctor what to do if you can no longer say what you want. There are two main types of advance directives. You can change them any time that your wishes change. · A living will tells your family and your doctor your wishes about life support and other treatment. · A durable power of  for health care lets you name a person to make treatment decisions for you when you can't speak for yourself. This person is called a health care agent. If you do not have an advance directive, decisions about your medical care may be made by a doctor or a  who doesn't know you. It may help to think of an advance directive as a gift to the people who care for you. If you have one, they won't have to make tough decisions by themselves. Follow-up care is a key part of your treatment and safety. Be sure to make and go to all appointments, and call your doctor if you are having problems. It's also a good idea to know your test results and keep a list of the medicines you take. How can you care for yourself at home? · Discuss your wishes with your loved ones and your doctor. This way, there are no surprises. · Many states have a unique form. Or you might use a universal form that has been approved by many states. This kind of form can sometimes be completed and stored online. Your electronic copy will then be available wherever you have a connection to the Internet. In most cases, doctors will respect your wishes even if you have a form from a different state. · You don't need a  to do an advance directive. But you may want to get legal advice. · Think about these questions when you prepare an advance directive: ¨ Who do you want to make decisions about your medical care if you are not able to? Many people choose a family member or close friend. ¨ Do you know enough about life support methods that might be used? If not, talk to your doctor so you understand. ¨ What are you most afraid of that might happen? You might be afraid of having pain, losing your independence, or being kept alive by machines. ¨ Where would you prefer to die? Choices include your home, a hospital, or a nursing home. ¨ Would you like to have information about hospice care to support you and your family? ¨ Do you want to donate organs when you die? ¨ Do you want certain Religion practices performed before you die? If so, put your wishes in the advance directive. · Read your advance directive every year, and make changes as needed. When should you call for help? Be sure to contact your doctor if you have any questions. Where can you learn more? Go to http://tiffanyAshlar Holdingsclare.info/. Enter R264 in the search box to learn more about \"Advance Directives: Care Instructions. \" Current as of: October 6, 2017 Content Version: 11.7 © 4933-8874 [employer]. Care instructions adapted under license by [employer] (which disclaims liability or warranty for this information). If you have questions about a medical condition or this instruction, always ask your healthcare professional. [address] any warranty or liability for your use of this information. Medicare Wellness Visit, Male The best way to live healthy is to have a lifestyle where you eat a well-balanced diet, exercise regularly, limit alcohol use, and quit all forms of tobacco/nicotine, if applicable. Regular preventive services are another way to keep healthy. Preventive services (vaccines, screening tests, monitoring & exams) can help personalize your care plan, which helps you manage your own care.  Screening tests can find health problems at the earliest stages, when they are easiest to treat. [address] [facility] follows the current, evidence-based guidelines published by the [facility] States Amor Ruiz ([facility]) when recommending preventive services for our patients. Because we follow these guidelines, sometimes recommendations change over time as research supports it. (For example, a prostate screening blood test is no longer routinely recommended for men with no symptoms.) Of course, you and your doctor may decide to screen more often for some diseases, based on your risk and co-morbidities (chronic disease you are already diagnosed with). Preventive services for you include: - Medicare offers their members a free annual wellness visit, which is time for you and your primary care provider to discuss and plan for your preventive service needs. Take advantage of this benefit every year! 
-All adults over age 72 should receive the recommended pneumonia vaccines. Current USPSTF guidelines recommend a series of two vaccines for the best pneumonia protection.  
-All adults should have a flu vaccine yearly and an ECG. All adults age 61 and older should receive a shingles vaccine once in their lifetime.   
-All adults age 38-68 who are overweight should have a diabetes screening test once every three years.  
-Other screening tests & preventive services for persons with diabetes include: an eye exam to screen for diabetic retinopathy, a kidney function test, a foot exam, and stricter control over your cholesterol.  
-Cardiovascular screening for adults with routine risk involves an electrocardiogram (ECG) at intervals determined by the provider.  
-Colorectal cancer screening should be done for adults age 54-65 with no increased risk factors for colorectal cancer. There are a number of acceptable methods of screening for this type of cancer. Each test has its own benefits and drawbacks.  Discuss with your provider what is most appropriate for you during your annual wellness visit. The different tests include: colonoscopy (considered the best screening method), a fecal occult blood test, a fecal DNA test, and sigmoidoscopy. 
-All adults born between [facility] should be screened once for Hepatitis C. 
-An Abdominal Aortic Aneurysm (AAA) Screening is recommended for men age 73-68 who has ever smoked in their lifetime. Here is a list of your current Health Maintenance items (your personalized list of preventive services) with a due date: 
Health Maintenance Due Topic Date Due  Glaucoma Screening   02/09/2012 [address] [place] [address] Annual Well Visit  09/14/2018 High Cholesterol: Care Instructions Your Care Instructions Cholesterol is a type of fat in your blood. It is needed for many body functions, such as making new cells. Cholesterol is made by your body. It also comes from food you eat. High cholesterol means that you have too much of the fat in your blood. This raises your risk of a heart attack and stroke. LDL and HDL are part of your total cholesterol. LDL is the \"bad\" cholesterol. High LDL can raise your risk for heart disease, heart attack, and stroke. HDL is the \"good\" cholesterol. It helps clear bad cholesterol from the body. High HDL is linked with a lower risk of heart disease, heart attack, and stroke. Your cholesterol levels help your doctor find out your risk for having a heart attack or stroke. You and your doctor can talk about whether you need to lower your risk and what treatment is best for you. A heart-healthy lifestyle along with medicines can help lower your cholesterol and your risk. The way you choose to lower your risk will depend on how high your risk is for heart attack and stroke. It will also depend on how you feel about taking medicines. Follow-up care is a key part of your treatment and safety.  Be sure to make and go to all appointments, and call your doctor if you are having problems. It's also a good idea to know your test results and keep a list of the medicines you take. How can you care for yourself at home? · Eat a variety of foods every day. Good choices include fruits, vegetables, whole grains (like oatmeal), dried beans and peas, nuts and seeds, soy products (like tofu), and fat-free or low-fat dairy products. · Replace butter, margarine, and hydrogenated or partially hydrogenated oils with olive and canola oils. (Canola oil margarine without trans fat is fine.) · Replace red meat with fish, poultry, and soy protein (like tofu). · Limit processed and packaged foods like chips, crackers, and cookies. · Bake, broil, or steam foods. Don't jarrell them. · Be physically active. Get at least 30 minutes of exercise on most days of the week. Walking is a good choice. You also may want to do other activities, such as running, swimming, cycling, or playing tennis or team sports. · Stay at a healthy weight or lose weight by making the changes in eating and physical activity listed above. Losing just a small amount of weight, even 5 to 10 pounds, can reduce your risk for having a heart attack or stroke. · Do not smoke. When should you call for help? Watch closely for changes in your health, and be sure to contact your doctor if: 
  · You need help making lifestyle changes.  
  · You have questions about your medicine. Where can you learn more? Go to http://tiffany-clare.info/. Enter K932 in the search box to learn more about \"High Cholesterol: Care Instructions. \" Current as of: May 10, 2017 Content Version: 11.7 © 7445-7069 [employer]. Care instructions adapted under license by [employer] (which disclaims liability or warranty for this information).  If you have questions about a medical condition or this instruction, always ask your healthcare professional. [employer] disclaims any warranty or liability for your use of this information. Advance Care Planning: Care Instructions Your Care Instructions It can be hard to live with an illness that cannot be cured. But if your health is getting worse, you may want to make decisions about end-of-life care. Planning for the end of your life does not mean that you are giving up. It is a way to make sure that your wishes are met. Clearly stating your wishes can make it easier for your loved ones. Making plans while you are still able may also ease your mind and make your final days less stressful and more meaningful. Follow-up care is a key part of your treatment and safety. Be sure to make and go to all appointments, and call your doctor if you are having problems. It's also a good idea to know your test results and keep a list of the medicines you take. What can you do to plan for the end of life? · You can bring these issues up with your doctor. You do not need to wait until your doctor starts the conversation. You might start with \"I would not be willing to live with . Ethelene Gauss Ethelene Gauss [address] \" When you complete this sentence it helps your doctor understand your wishes. · Talk openly and honestly with your doctor. This is the best way to understand the decisions you will need to make as your health changes. Know that you can always change your mind. · Ask your doctor about commonly used life-support measures. These include tube feedings, breathing machines, and fluids given through a vein (IV). Understanding these treatments will help you decide whether you want them. · You may choose to have these life-supporting treatments for a limited time. This allows a trial period to see whether they will help you. You may also decide that you want your doctor to take only certain measures to keep you alive. It is important to spell out these conditions so that your doctor and family understand them. · Talk to your doctor about how long you are likely to live. He or she may be able to give you an idea of what usually happens with your specific illness. · Think about preparing papers that state your wishes. This way there will not be any confusion about what you want. You can change your instructions at any time. Which papers should you prepare? Advance directives are legal papers that tell doctors how you want to be cared for at the end of your life. You do not need a  to write these papers. Ask your doctor or your [facility] department for information on how to write your advance directives. They may have the forms for each of these types of papers. Make sure your doctor has a copy of these on file, and give a copy to a family member or close friend. · Consider a do-not-resuscitate order (DNR). This order asks that no extra treatments be done if your heart stops or you stop breathing. Extra treatments may include cardiopulmonary resuscitation (CPR), electrical shock to restart your heart, or a machine to breathe for you. If you decide to have a DNR order, ask your doctor to explain and write it. Place the order in your home where everyone can easily see it. · Consider a living will. A living will explains your wishes about life support and other treatments at the end of your life if you become unable to speak for yourself. Living chicas tell doctors to use or not use treatments that would keep you alive. You must have one or two witnesses or a notary present when you sign this form. · Consider a durable power of  for health care. This allows you to name a person to make decisions about your care if you are not able to. Most people ask a close friend or family member. Talk to this person about the kinds of treatments you want and those that you do not want. Make sure this person understands your wishes. These legal papers are simple to change.  Tell your doctor what you want to change, and ask him or her to make a note in your medical file. Give your family updated copies of the papers. Where can you learn more? Go to http://tiffany-clare.info/. Enter P184 in the search box to learn more about \"Advance Care Planning: Care Instructions. \" Current as of: November 17, 2016 Content Version: 11.3 © 8717-0588 [employer]. Care instructions adapted under license by "[employer] (which disclaims liability or warranty for this information). If you have questions about a medical condition or this instruction, always ask your healthcare professional. [place] [address] any warranty or liability for your use of this information.

## 2018-09-19 LAB
ALBUMIN SERPL-MCNC: 4.3 G/DL (ref 3.5–4.8)
ALBUMIN/GLOB SERPL: 1.7 {RATIO} (ref 1.2–2.2)
ALP SERPL-CCNC: 99 IU/L (ref 39–117)
ALT SERPL-CCNC: 17 IU/L (ref 0–44)
AST SERPL-CCNC: 24 IU/L (ref 0–40)
BILIRUB SERPL-MCNC: 0.3 MG/DL (ref 0–1.2)
BUN SERPL-MCNC: 8 MG/DL (ref 8–27)
BUN/CREAT SERPL: 8 (ref 10–24)
CALCIUM SERPL-MCNC: 9.6 MG/DL (ref 8.6–10.2)
CHLORIDE SERPL-SCNC: 103 MMOL/L (ref 96–106)
CO2 SERPL-SCNC: 26 MMOL/L (ref 20–29)
CREAT SERPL-MCNC: 1.05 MG/DL (ref 0.76–1.27)
ERYTHROCYTE [DISTWIDTH] IN BLOOD BY AUTOMATED COUNT: 14.7 % (ref 12.3–15.4)
FOLATE SERPL-MCNC: 6.8 NG/ML
GLOBULIN SER CALC-MCNC: 2.5 G/DL (ref 1.5–4.5)
GLUCOSE SERPL-MCNC: 103 MG/DL (ref 65–99)
HCT VFR BLD AUTO: 45 % (ref 37.5–51)
HGB BLD-MCNC: 14.6 G/DL (ref 13–17.7)
MCH RBC QN AUTO: 28.5 PG (ref 26.6–33)
MCHC RBC AUTO-ENTMCNC: 32.4 G/DL (ref 31.5–35.7)
MCV RBC AUTO: 88 FL (ref 79–97)
PLATELET # BLD AUTO: 121 X10E3/UL (ref 150–379)
POTASSIUM SERPL-SCNC: 4.4 MMOL/L (ref 3.5–5.2)
PROT SERPL-MCNC: 6.8 G/DL (ref 6–8.5)
RBC # BLD AUTO: 5.12 X10E6/UL (ref 4.14–5.8)
SODIUM SERPL-SCNC: 144 MMOL/L (ref 134–144)
TSH SERPL DL<=0.005 MIU/L-ACNC: 2.61 UIU/ML (ref 0.45–4.5)
VIT B12 SERPL-MCNC: 246 PG/ML (ref 232–1245)
WBC # BLD AUTO: 3.8 X10E3/UL (ref 3.4–10.8)

## 2018-11-06 NOTE — PATIENT INSTRUCTIONS
Learning About High Blood Pressure  What is high blood pressure? Blood pressure is a measure of how hard the blood pushes against the walls of your arteries. It's normal for blood pressure to go up and down throughout the day, but if it stays up, you have high blood pressure. Another name for high blood pressure is hypertension. Two numbers tell you your blood pressure. The first number is the systolic pressure. It shows how hard the blood pushes when your heart is pumping. The second number is the diastolic pressure. It shows how hard the blood pushes between heartbeats, when your heart is relaxed and filling with blood. A blood pressure of less than 120/80 (say \"120 over 80\") is ideal for an adult. High blood pressure is 140/90 or higher. You have high blood pressure if your top number is 140 or higher or your bottom number is 90 or higher, or both. Many people fall into the category in between, called prehypertension. People with prehypertension need to make lifestyle changes to bring their blood pressure down and help prevent or delay high blood pressure. What happens when you have high blood pressure? · Blood flows through your arteries with too much force. Over time, this damages the walls of your arteries. But you can't feel it. High blood pressure usually doesn't cause symptoms. · Fat and calcium start to build up in your arteries. This buildup is called plaque. Plaque makes your arteries narrower and stiffer. Blood can't flow through them as easily. · This lack of good blood flow starts to damage some of the organs in your body. This can lead to problems such as coronary artery disease and heart attack, heart failure, stroke, kidney failure, and eye damage. How can you prevent high blood pressure? · Stay at a healthy weight. · Try to limit how much sodium you eat to less than 2,300 milligrams (mg) a day.  If you limit your sodium to 1,500 mg a day, you can lower your blood pressure even Physician at bedside.     more.  ¨ Buy foods that are labeled \"unsalted,\" \"sodium-free,\" or \"low-sodium. \" Foods labeled \"reduced-sodium\" and \"light sodium\" may still have too much sodium. ¨ Flavor your food with garlic, lemon juice, onion, vinegar, herbs, and spices instead of salt. Do not use soy sauce, steak sauce, onion salt, garlic salt, mustard, or ketchup on your food. ¨ Use less salt (or none) when recipes call for it. You can often use half the salt a recipe calls for without losing flavor. · Be physically active. Get at least 30 minutes of exercise on most days of the week. Walking is a good choice. You also may want to do other activities, such as running, swimming, cycling, or playing tennis or team sports. · Limit alcohol to 2 drinks a day for men and 1 drink a day for women. · Eat plenty of fruits, vegetables, and low-fat dairy products. Eat less saturated and total fats. How is high blood pressure treated? · Your doctor will suggest making lifestyle changes. For example, your doctor may ask you to eat healthy foods, quit smoking, lose extra weight, and be more active. · If lifestyle changes don't help enough or your blood pressure is very high, you will have to take medicine every day. Follow-up care is a key part of your treatment and safety. Be sure to make and go to all appointments, and call your doctor if you are having problems. It's also a good idea to know your test results and keep a list of the medicines you take. Where can you learn more? Go to http://tiffany-clare.info/. Enter P501 in the search box to learn more about \"Learning About High Blood Pressure. \"  Current as of: March 23, 2016  Content Version: 11.1  © 2678-3126 [employer] Care instructions adapted under license by [email or web address] (which disclaims liability or warranty for this information).  If you have questions about a medical condition or this instruction, always ask your healthcare professional. [employer] disclaims any warranty or liability for your use of this information.

## 2018-12-18 ENCOUNTER — OFFICE VISIT (OUTPATIENT)
Dept: FAMILY MEDICINE CLINIC | Age: 73
End: 2018-12-18

## 2018-12-18 ENCOUNTER — HOSPITAL ENCOUNTER (OUTPATIENT)
Dept: LAB | Age: 73
Discharge: HOME | End: 2018-12-18
Payer: MEDICARE

## 2018-12-18 VITALS
HEART RATE: 85 BPM | HEIGHT: 72 IN | TEMPERATURE: 98.1 F | OXYGEN SATURATION: 95 % | SYSTOLIC BLOOD PRESSURE: 135 MMHG | RESPIRATION RATE: 18 BRPM | DIASTOLIC BLOOD PRESSURE: 84 MMHG | BODY MASS INDEX: 28.88 KG/M2 | WEIGHT: 213.2 LBS

## 2018-12-18 DIAGNOSIS — Z13.5 SCREENING FOR GLAUCOMA: ICD-10-CM

## 2018-12-18 DIAGNOSIS — I10 HYPERTENSION, UNSPECIFIED TYPE: ICD-10-CM

## 2018-12-18 DIAGNOSIS — Z23 ENCOUNTER FOR IMMUNIZATION: Primary | ICD-10-CM

## 2018-12-18 DIAGNOSIS — R60.0 LOCALIZED EDEMA: ICD-10-CM

## 2018-12-18 DIAGNOSIS — E53.8 B12 DEFICIENCY: ICD-10-CM

## 2018-12-18 PROCEDURE — 36415 COLL VENOUS BLD VENIPUNCTURE: CPT

## 2018-12-18 PROCEDURE — 80048 BASIC METABOLIC PNL TOTAL CA: CPT

## 2018-12-18 RX ORDER — FUROSEMIDE 40 MG/1
40 TABLET ORAL DAILY
Qty: 90 TAB | Refills: 0 | Status: SHIPPED | OUTPATIENT
Start: 2018-12-18 | End: 2019-03-18 | Stop reason: SDUPTHER

## 2018-12-18 RX ORDER — CYANOCOBALAMIN 1000 UG/ML
1000 INJECTION, SOLUTION INTRAMUSCULAR; SUBCUTANEOUS ONCE
Qty: 1 ML | Refills: 0
Start: 2018-12-18 | End: 2018-12-18

## 2018-12-18 RX ORDER — POTASSIUM CHLORIDE 20 MEQ/1
20 TABLET, EXTENDED RELEASE ORAL DAILY
Qty: 90 TAB | Refills: 0 | Status: SHIPPED | OUTPATIENT
Start: 2018-12-18 | End: 2019-03-18 | Stop reason: SDUPTHER

## 2018-12-18 NOTE — PROGRESS NOTES
Name and  verified. Chief Complaint   Patient presents with    Hypertension     PATIENT DECLINED FLU VACCINE. Health Maintenance reviewed-discussed with patient. 1. Have you been to the ER, urgent care clinic since your last visit? Hospitalized since your last visit? no    2. Have you seen or consulted any other health care providers outside of the [address] [place] [address] since your last visit? Include any pap smears or colon screening.  no

## 2018-12-18 NOTE — PROGRESS NOTES
HISTORY OF PRESENT ILLNESS  Eveline Brown is a 72 y.o. male. HPI     b12 def  Has been doing otc not helping the levels buys it from store,   today pitting Edema is at 1+  Vitals 12/18/2018 9/18/2018 6/19/2018 3/19/2018   Weight 213 lb 3.2 oz 209 lb 3.2 oz 210 lb 6.4 oz 211 lb 9.6 oz     Vitals 12/18/2017 10/25/2017 9/13/2017   Weight 199 lb 3.2 oz 205 lb 12.8 oz 225 lb 6.4 oz       HTN  Today pt present for Bp check and the patient stating that so far there has been a Compliancy w/ the bp meds, having had the low salt diet, h is active at work with daily walking,   today the pt denies Chest Pain, has no lightheadedness,  otherwise feeling better since the last visit      Current Outpatient Medications   Medication Sig Dispense Refill    furosemide (LASIX) 40 mg tablet Take 1 Tab by mouth daily. 90 Tab 0    potassium chloride (K-DUR, KLOR-CON) 20 mEq tablet Take 1 Tab by mouth daily. Indications: hypokalemia prevention 90 Tab 0    aspirin (CHILDREN'S ASPIRIN) 81 mg chewable tablet TAKE ONE TABLET BY MOUTH EVERY DAY 30 Tab 11    cholecalciferol (VITAMIN D3) 1,000 unit cap One tab daily 30 Cap 11    cyanocobalamin 2,500 mcg Subl 1 Tab by SubLINGual route daily.  30 Tab 6     No Known Allergies  Past Medical History:   Diagnosis Date    Elevated PSA, less than 10 ng/ml 12/10/2012    Hypertension     Hypokalemia 7/25/2014    Localized edema 2/7/2017    Patient is full code 9/18/2018    Vitamin D deficiency 7/8/2014    WBC decreased 7/6/2012     Past Surgical History:   Procedure Laterality Date    COLONOSCOPY,DIAGNOSTIC  2/5/2015         HX ORTHOPAEDIC      left knee surg in  1984     Family History   Problem Relation Age of Onset    Kidney Disease Father      Social History     Tobacco Use    Smoking status: Never Smoker    Smokeless tobacco: Never Used   Substance Use Topics    Alcohol use: No      Lab Results   Component Value Date/Time    WBC 3.8 09/18/2018 10:38 AM    HGB 14.6 09/18/2018 10:38 AM HCT 45.0 09/18/2018 10:38 AM    PLATELET 954 (L) 55/82/3143 10:38 AM    MCV 88 09/18/2018 10:38 AM     Lab Results   Component Value Date/Time    Hemoglobin A1c 6.5 (H) 01/13/2016 09:03 AM    Hemoglobin A1c 6.3 (H) 07/13/2015 09:18 AM    Hemoglobin A1c 6.4 (H) 01/26/2015 10:21 AM    Glucose 103 (H) 09/18/2018 10:38 AM    LDL, calculated 68 02/07/2017 09:21 AM    Creatinine 1.05 09/18/2018 10:38 AM      Lab Results   Component Value Date/Time    Cholesterol, total 139 02/07/2017 09:21 AM    HDL Cholesterol 59 02/07/2017 09:21 AM    LDL, calculated 68 02/07/2017 09:21 AM    Triglyceride 62 02/07/2017 09:21 AM     Lab Results   Component Value Date/Time    TSH 2.610 09/18/2018 10:38 AM    T4, Free 0.86 04/23/2012 09:30 AM         Review of Systems   Constitutional: Negative for chills and fever. HENT: Negative for ear pain and nosebleeds. Eyes: Negative for blurred vision, pain and discharge. Respiratory: Negative for shortness of breath. Cardiovascular: Negative for chest pain and leg swelling. Gastrointestinal: Negative for constipation, diarrhea, nausea and vomiting. Genitourinary: Negative for frequency. Musculoskeletal: Negative for joint pain. Skin: Negative for itching and rash. Neurological: Negative for headaches. Psychiatric/Behavioral: Negative for depression. The patient is not nervous/anxious. Physical Exam   Constitutional: He is oriented to person, place, and time. He appears well-developed and well-nourished. HENT:   Head: Normocephalic and atraumatic. Mouth/Throat: No oropharyngeal exudate. Eyes: Conjunctivae and EOM are normal. Pupils are equal, round, and reactive to light. Neck: Normal range of motion. Neck supple. No JVD present. No thyromegaly present. Cardiovascular: Normal rate, regular rhythm, normal heart sounds and intact distal pulses. Exam reveals no friction rub. No murmur heard.   Pulmonary/Chest: Effort normal and breath sounds normal. No respiratory distress. He has no wheezes. He has no rales. Abdominal: Soft. Bowel sounds are normal. He exhibits no distension. There is no tenderness. Musculoskeletal: He exhibits no edema or tenderness. Lymphadenopathy:     He has no cervical adenopathy. Neurological: He is alert and oriented to person, place, and time. He has normal reflexes. Skin: Skin is warm. No rash noted. No erythema. Psychiatric: He has a normal mood and affect. His behavior is normal.   Nursing note and vitals reviewed. ASSESSMENT and PLAN  Diagnoses and all orders for this visit:    1. Encounter for immunization  -     varicella-zoster recombinant, PF, (SHINGRIX) 50 mcg/0.5 mL susr injection; 0.5 mL by IntraMUSCular route once for 1 dose. 2. Screening for glaucoma  -     REFERRAL TO OPTOMETRY    3. Localized edema  -     furosemide (LASIX) 40 mg tablet; Take 1 Tab by mouth daily. -     potassium chloride (K-DUR, KLOR-CON) 20 mEq tablet; Take 1 Tab by mouth daily.  -     METABOLIC PANEL, BASIC    4. Hypertension, unspecified type  -     furosemide (LASIX) 40 mg tablet; Take 1 Tab by mouth daily. -     potassium chloride (K-DUR, KLOR-CON) 20 mEq tablet; Take 1 Tab by mouth daily.  -     METABOLIC PANEL, BASIC    5. B12 deficiency  -     VITAMIN B12 INJECTION  -     THER/PROPH/DIAG INJECTION, SUBCUT/IM  -     cyanocobalamin (VITAMIN B-12) 1,000 mcg/mL injection; 1 mL by IntraMUSCular route once for 1 dose.   -     METABOLIC PANEL, BASIC      Leg elevation at all times or most of the times, support hoses b/l 24hrs per day, ambulation as possible, use of two pillows at nights while in bed

## 2018-12-18 NOTE — PATIENT INSTRUCTIONS
High Blood Pressure: Care Instructions  Your Care Instructions    If your blood pressure is usually above 130/80, you have high blood pressure, or hypertension. That means the top number is 130 or higher or the bottom number is 80 or higher, or both. Despite what a lot of people think, high blood pressure usually doesn't cause headaches or make you feel dizzy or lightheaded. It usually has no symptoms. But it does increase your risk for heart attack, stroke, and kidney or eye damage. The higher your blood pressure, the more your risk increases. Your doctor will give you a goal for your blood pressure. Your goal will be based on your health and your age. Lifestyle changes, such as eating healthy and being active, are always important to help lower blood pressure. You might also take medicine to reach your blood pressure goal.  Follow-up care is a key part of your treatment and safety. Be sure to make and go to all appointments, and call your doctor if you are having problems. It's also a good idea to know your test results and keep a list of the medicines you take. How can you care for yourself at home? Medical treatment  · If you stop taking your medicine, your blood pressure will go back up. You may take one or more types of medicine to lower your blood pressure. Be safe with medicines. Take your medicine exactly as prescribed. Call your doctor if you think you are having a problem with your medicine. · Talk to your doctor before you start taking aspirin every day. Aspirin can help certain people lower their risk of a heart attack or stroke. But taking aspirin isn't right for everyone, because it can cause serious bleeding. · See your doctor regularly. You may need to see the doctor more often at first or until your blood pressure comes down. · If you are taking blood pressure medicine, talk to your doctor before you take decongestants or anti-inflammatory medicine, such as ibuprofen.  Some of these medicines can raise blood pressure. · Learn how to check your blood pressure at home. Lifestyle changes  · Stay at a healthy weight. This is especially important if you put on weight around the waist. Losing even 10 pounds can help you lower your blood pressure. · If your doctor recommends it, get more exercise. Walking is a good choice. Bit by bit, increase the amount you walk every day. Try for at least 30 minutes on most days of the week. You also may want to swim, bike, or do other activities. · Avoid or limit alcohol. Talk to your doctor about whether you can drink any alcohol. · Try to limit how much sodium you eat to less than 2,300 milligrams (mg) a day. Your doctor may ask you to try to eat less than 1,500 mg a day. · Eat plenty of fruits (such as bananas and oranges), vegetables, legumes, whole grains, and low-fat dairy products. · Lower the amount of saturated fat in your diet. Saturated fat is found in animal products such as milk, cheese, and meat. Limiting these foods may help you lose weight and also lower your risk for heart disease. · Do not smoke. Smoking increases your risk for heart attack and stroke. If you need help quitting, talk to your doctor about stop-smoking programs and medicines. These can increase your chances of quitting for good. When should you call for help? Call 911 anytime you think you may need emergency care. This may mean having symptoms that suggest that your blood pressure is causing a serious heart or blood vessel problem. Your blood pressure may be over 180/120.   For example, call 911 if:    · You have symptoms of a heart attack. These may include:  ? Chest pain or pressure, or a strange feeling in the chest.  ? Sweating. ? Shortness of breath. ? Nausea or vomiting. ? Pain, pressure, or a strange feeling in the back, neck, jaw, or upper belly or in one or both shoulders or arms. ? Lightheadedness or sudden weakness.   ? A fast or irregular heartbeat.     · You have symptoms of a stroke. These may include:  ? Sudden numbness, tingling, weakness, or loss of movement in your face, arm, or leg, especially on only one side of your body. ? Sudden vision changes. ? Sudden trouble speaking. ? Sudden confusion or trouble understanding simple statements. ? Sudden problems with walking or balance. ? A sudden, severe headache that is different from past headaches.     · You have severe back or belly pain.    Do not wait until your blood pressure comes down on its own. Get help right away.   Call your doctor now or seek immediate care if:    · Your blood pressure is much higher than normal (such as 180/120 or higher), but you don't have symptoms.     · You think high blood pressure is causing symptoms, such as:  ? Severe headache.  ? Blurry vision.    Watch closely for changes in your health, and be sure to contact your doctor if:    · Your blood pressure measures higher than your doctor recommends at least 2 times. That means the top number is higher or the bottom number is higher, or both.     · You think you may be having side effects from your blood pressure medicine. Where can you learn more? Go to http://tiffany-clare.info/. Enter Y173 in the search box to learn more about \"High Blood Pressure: Care Instructions. \"  Current as of: December 6, 2017  Content Version: 11.8  © 2851-8582 [employer]. Care instructions adapted under license by [employer] (which disclaims liability or warranty for this information). If you have questions about a medical condition or this instruction, always ask your healthcare professional. [place] [address] any warranty or liability for your use of this information. Vitamin B12: About This Test  What is it? This blood test measures the amount of vitamin B12 in your blood. Your body needs this B vitamin to make blood cells and to keep your nervous system healthy.   Why is this test done? This test is used to:  · Check for vitamin B12 deficiency anemia, especially in those who have had stomach or intestinal surgery or who have small intestine problems or a family history of this anemia. · Diagnose the cause of certain types of anemia. · Help find the cause of dementia or other nervous system symptoms, such as tingling or numbness of the arms or legs. How can you prepare for the test?  · In general, you don't need to prepare before having this test. Your doctor may give you some specific instructions. What happens during the test?  · A health professional takes a sample of your blood. What else should you know about the test?  · Your results will include an explanation of what a \"normal\" result is. This is called a \"reference range. \" It is just a guide. Your doctor will evaluate your results based on your health and other factors. This means that a value that falls outside the normal values listed may still be normal for you. · Vitamin B12 is usually measured at the same time as folic acid is tested, because a lack of either one can lead to a form of anemia called megaloblastic anemia. How long does the test take? · The test will take a few minutes. What happens after the test?  · You will probably be able to go home right away. · You can go back to your usual activities right away. Follow-up care is a key part of your treatment and safety. Be sure to make and go to all appointments, and call your doctor if you are having problems. It's also a good idea to keep a list of the medicines you take. Ask your doctor when you can expect to have your test results. Where can you learn more? Go to http://tiffany-clare.info/. Enter Q213 in the search box to learn more about \"Vitamin B12: About This Test.\"  Current as of: May 7, 2018  Content Version: 11.8  © 4348-7046 [employer].  Care instructions adapted under license by [employer] (which disclaims liability or warranty for this information). If you have questions about a medical condition or this instruction, always ask your healthcare professional. [address] any warranty or liability for your use of this information.

## 2018-12-18 NOTE — PROGRESS NOTES
After obtaining consent, and per orders of , B12 1000mcg/ml  given to  Left deltoid Im  . Patient instructed to remain in clinic for 15 minutes afterwards, and to report any adverse reaction to me immediately.  Patient did not have any adverse reactions during this office visit

## 2018-12-19 LAB
BUN SERPL-MCNC: 10 MG/DL (ref 8–27)
BUN/CREAT SERPL: 10 (ref 10–24)
CALCIUM SERPL-MCNC: 9.3 MG/DL (ref 8.6–10.2)
CHLORIDE SERPL-SCNC: 104 MMOL/L (ref 96–106)
CO2 SERPL-SCNC: 26 MMOL/L (ref 20–29)
CREAT SERPL-MCNC: 1.03 MG/DL (ref 0.76–1.27)
GLUCOSE SERPL-MCNC: 94 MG/DL (ref 65–99)
POTASSIUM SERPL-SCNC: 3.8 MMOL/L (ref 3.5–5.2)
SODIUM SERPL-SCNC: 144 MMOL/L (ref 134–144)

## 2019-01-15 ENCOUNTER — OFFICE VISIT (OUTPATIENT)
Dept: FAMILY MEDICINE CLINIC | Age: 74
End: 2019-01-15

## 2019-01-15 VITALS
OXYGEN SATURATION: 95 % | BODY MASS INDEX: 28.12 KG/M2 | RESPIRATION RATE: 18 BRPM | DIASTOLIC BLOOD PRESSURE: 65 MMHG | WEIGHT: 207.6 LBS | HEIGHT: 72 IN | HEART RATE: 77 BPM | TEMPERATURE: 96.5 F | SYSTOLIC BLOOD PRESSURE: 125 MMHG

## 2019-01-15 DIAGNOSIS — E53.8 VITAMIN B 12 DEFICIENCY: Primary | ICD-10-CM

## 2019-01-15 RX ORDER — CYANOCOBALAMIN 1000 UG/ML
1000 INJECTION, SOLUTION INTRAMUSCULAR; SUBCUTANEOUS ONCE
Qty: 1 ML | Refills: 0
Start: 2019-01-15 | End: 2019-01-15

## 2019-01-15 NOTE — PATIENT INSTRUCTIONS
High Blood Pressure: Care Instructions Your Care Instructions If your blood pressure is usually above 130/80, you have high blood pressure, or hypertension. That means the top number is 130 or higher or the bottom number is 80 or higher, or both. Despite what a lot of people think, high blood pressure usually doesn't cause headaches or make you feel dizzy or lightheaded. It usually has no symptoms. But it does increase your risk for heart attack, stroke, and kidney or eye damage. The higher your blood pressure, the more your risk increases. Your doctor will give you a goal for your blood pressure. Your goal will be based on your health and your age. Lifestyle changes, such as eating healthy and being active, are always important to help lower blood pressure. You might also take medicine to reach your blood pressure goal. 
Follow-up care is a key part of your treatment and safety. Be sure to make and go to all appointments, and call your doctor if you are having problems. It's also a good idea to know your test results and keep a list of the medicines you take. How can you care for yourself at home? Medical treatment · If you stop taking your medicine, your blood pressure will go back up. You may take one or more types of medicine to lower your blood pressure. Be safe with medicines. Take your medicine exactly as prescribed. Call your doctor if you think you are having a problem with your medicine. · Talk to your doctor before you start taking aspirin every day. Aspirin can help certain people lower their risk of a heart attack or stroke. But taking aspirin isn't right for everyone, because it can cause serious bleeding. · See your doctor regularly. You may need to see the doctor more often at first or until your blood pressure comes down. · If you are taking blood pressure medicine, talk to your doctor before you take decongestants or anti-inflammatory medicine, such as ibuprofen. Some of these medicines can raise blood pressure. · Learn how to check your blood pressure at home. Lifestyle changes · Stay at a healthy weight. This is especially important if you put on weight around the waist. Losing even 10 pounds can help you lower your blood pressure. · If your doctor recommends it, get more exercise. Walking is a good choice. Bit by bit, increase the amount you walk every day. Try for at least 30 minutes on most days of the week. You also may want to swim, bike, or do other activities. · Avoid or limit alcohol. Talk to your doctor about whether you can drink any alcohol. · Try to limit how much sodium you eat to less than 2,300 milligrams (mg) a day. Your doctor may ask you to try to eat less than 1,500 mg a day. · Eat plenty of fruits (such as bananas and oranges), vegetables, legumes, whole grains, and low-fat dairy products. · Lower the amount of saturated fat in your diet. Saturated fat is found in animal products such as milk, cheese, and meat. Limiting these foods may help you lose weight and also lower your risk for heart disease. · Do not smoke. Smoking increases your risk for heart attack and stroke. If you need help quitting, talk to your doctor about stop-smoking programs and medicines. These can increase your chances of quitting for good. When should you call for help? Call 911 anytime you think you may need emergency care. This may mean having symptoms that suggest that your blood pressure is causing a serious heart or blood vessel problem. Your blood pressure may be over 180/120. 
 For example, call 911 if: 
  · You have symptoms of a heart attack. These may include: 
? Chest pain or pressure, or a strange feeling in the chest. 
? Sweating. ? Shortness of breath. ? Nausea or vomiting. ? Pain, pressure, or a strange feeling in the back, neck, jaw, or upper belly or in one or both shoulders or arms. ? Lightheadedness or sudden weakness. ? A fast or irregular heartbeat.  
  · You have symptoms of a stroke. These may include: 
? Sudden numbness, tingling, weakness, or loss of movement in your face, arm, or leg, especially on only one side of your body. ? Sudden vision changes. ? Sudden trouble speaking. ? Sudden confusion or trouble understanding simple statements. ? Sudden problems with walking or balance. ? A sudden, severe headache that is different from past headaches.  
  · You have severe back or belly pain.  
 Do not wait until your blood pressure comes down on its own. Get help right away. 
 Call your doctor now or seek immediate care if: 
  · Your blood pressure is much higher than normal (such as 180/120 or higher), but you don't have symptoms.  
  · You think high blood pressure is causing symptoms, such as: 
? Severe headache. 
? Blurry vision.  
 Watch closely for changes in your health, and be sure to contact your doctor if: 
  · Your blood pressure measures higher than your doctor recommends at least 2 times. That means the top number is higher or the bottom number is higher, or both.  
  · You think you may be having side effects from your blood pressure medicine. Where can you learn more? Go to http://tiffany-clare.info/. Enter R916 in the search box to learn more about \"High Blood Pressure: Care Instructions. \" Current as of: December 6, 2017 Content Version: 11.8 © 1466-6695 [employer]. Care instructions adapted under license by [employer] (which disclaims liability or warranty for this information). If you have questions about a medical condition or this instruction, always ask your healthcare professional. [place] [address] any warranty or liability for your use of this information.

## 2019-01-15 NOTE — PROGRESS NOTES
After obtaining consent, and per orders of , b12 injection  given to  Left deltoid IM  . Patient instructed to remain in clinic for 15 minutes afterwards, and to report any adverse reaction to me immediately. Patient did not have any adverse reactions during this office visit

## 2019-01-15 NOTE — PROGRESS NOTES
HISTORY OF PRESENT ILLNESS Ruba Mitchell is a 72 y.o. male. HPI  
b12 fdef nv Current Outpatient Medications Medication Sig Dispense Refill  furosemide (LASIX) 40 mg tablet Take 1 Tab by mouth daily. 90 Tab 0  
 potassium chloride (K-DUR, KLOR-CON) 20 mEq tablet Take 1 Tab by mouth daily. 90 Tab 0  
 aspirin (CHILDREN'S ASPIRIN) 81 mg chewable tablet TAKE ONE TABLET BY MOUTH EVERY DAY 30 Tab 11  
 cholecalciferol (VITAMIN D3) 1,000 unit cap One tab daily 30 Cap 11 No Known Allergies Past Medical History:  
Diagnosis Date  Elevated PSA, less than 10 ng/ml 12/10/2012  Hypertension  Hypokalemia 7/25/2014  Localized edema 2/7/2017  Patient is full code 9/18/2018  Vitamin D deficiency 7/8/2014  WBC decreased 7/6/2012 Past Surgical History:  
Procedure Laterality Date  COLONOSCOPY,DIAGNOSTIC  2/5/2015  HX ORTHOPAEDIC    
 left knee surg in  1984 Family History Problem Relation Age of Onset  Kidney Disease Father Social History Tobacco Use  Smoking status: Never Smoker  Smokeless tobacco: Never Used Substance Use Topics  Alcohol use: No  
  
Lab Results Component Value Date/Time WBC 3.8 09/18/2018 10:38 AM  
 HGB 14.6 09/18/2018 10:38 AM  
 HCT 45.0 09/18/2018 10:38 AM  
 PLATELET 526 (L) 15/36/1169 10:38 AM  
 MCV 88 09/18/2018 10:38 AM  
 
Lab Results Component Value Date/Time GFR est non-AA 73 12/18/2018 09:25 AM  
 GFR est AA 84 12/18/2018 09:25 AM  
 Creatinine 1.03 12/18/2018 09:25 AM  
 BUN 10 12/18/2018 09:25 AM  
 Sodium 144 12/18/2018 09:25 AM  
 Potassium 3.8 12/18/2018 09:25 AM  
 Chloride 104 12/18/2018 09:25 AM  
 CO2 26 12/18/2018 09:25 AM  
 Magnesium 2.1 01/13/2016 09:03 AM  
  
Review of Systems Constitutional: Negative for chills and fever. HENT: Negative for ear pain and nosebleeds. Eyes: Negative for blurred vision, pain and discharge. Respiratory: Negative for shortness of breath. Cardiovascular: Negative for chest pain and leg swelling. Gastrointestinal: Negative for constipation, diarrhea, nausea and vomiting. Genitourinary: Negative for frequency. Musculoskeletal: Negative for joint pain. Skin: Negative for itching and rash. Neurological: Negative for headaches. Psychiatric/Behavioral: Negative for depression. The patient is not nervous/anxious. Physical Exam  
Constitutional: He is oriented to person, place, and time. He appears well-developed and well-nourished. HENT:  
Head: Normocephalic and atraumatic. Mouth/Throat: No oropharyngeal exudate. Eyes: Conjunctivae and EOM are normal.  
Neck: Normal range of motion. Neck supple. Cardiovascular: Normal rate, regular rhythm and normal heart sounds. No murmur heard. Pulmonary/Chest: Effort normal and breath sounds normal. No respiratory distress. Abdominal: Soft. Bowel sounds are normal. He exhibits no distension. There is no rebound. Musculoskeletal: He exhibits no edema or tenderness. Neurological: He is alert and oriented to person, place, and time. Skin: Skin is warm. No erythema. Psychiatric: He has a normal mood and affect. His behavior is normal.  
Nursing note and vitals reviewed. ASSESSMENT and PLAN Diagnoses and all orders for this visit: 1. Vitamin B 12 deficiency 
-     VITAMIN B12 INJECTION 
-     THER/PROPH/DIAG INJECTION, SUBCUT/IM 
-     cyanocobalamin (VITAMIN B-12) 1,000 mcg/mL injection; 1 mL by IntraMUSCular route once for 1 dose.

## 2019-02-12 ENCOUNTER — CLINICAL SUPPORT (OUTPATIENT)
Dept: FAMILY MEDICINE CLINIC | Age: 74
End: 2019-02-12

## 2019-02-12 DIAGNOSIS — E53.8 B12 DEFICIENCY: Primary | ICD-10-CM

## 2019-02-12 RX ORDER — CYANOCOBALAMIN 1000 UG/ML
1000 INJECTION, SOLUTION INTRAMUSCULAR; SUBCUTANEOUS ONCE
Qty: 1 ML | Refills: 0
Start: 2019-02-12 | End: 2019-02-12

## 2019-02-12 NOTE — PROGRESS NOTES
Chief Complaint Patient presents with  Immunization/Injection B12 After obtaining consent, and per orders of , b12 1000mcg/ml injection given to  Left deltoid IM  . Patient instructed to remain in clinic for 15 minutes afterwards, and to report any adverse reaction to me immediately. Patient did not have any adverse reactions during this office visit

## 2019-02-12 NOTE — PATIENT INSTRUCTIONS
Cyanocobalamin (Vitamin B-12) (By injection) Cyanocobalamin (hwo-vj-vg-koe-BAL-a-min) Treats vitamin B-12 deficiency. Brand Name(s): B-12 Compliance Injection Kit, B-12 Kit, Physicians EZ Use B-12 Compliance, Vitamin Deficiency Injectable System - B12 There may be other brand names for this medicine. When This Medicine Should Not Be Used: This medicine is not right for everyone. Do not use it if you had an allergic reaction to vitamin B-12. How to Use This Medicine:  
Injectable · Your doctor will prescribe your exact dose and tell you how often it should be given. This medicine is given as a shot into one of your muscles. · Missed dose: Call your doctor or pharmacist for instructions. Drugs and Foods to Avoid: Ask your doctor or pharmacist before using any other medicine, including over-the-counter medicines, vitamins, and herbal products. · Do not use folic acid supplements unless your doctor says it is okay. Warnings While Using This Medicine: · Tell your doctor if you are pregnant or breastfeeding, or if you have kidney disease. · Keep all medicine out of the reach of children. Never share your medicine with anyone. Possible Side Effects While Using This Medicine:  
Call your doctor right away if you notice any of these side effects: · Allergic reaction: Itching or hives, swelling in your face or hands, swelling or tingling in your mouth or throat, chest tightness, trouble breathing If you notice other side effects that you think are caused by this medicine, tell your doctor. Call your doctor for medical advice about side effects. You may report side effects to FDA at 0-077-FDA-6332 © 2017 [facility] Information is for End User's use only and may not be sold, redistributed or otherwise used for commercial purposes. The above information is an  only. It is not intended as medical advice for individual conditions or treatments.  Talk to your doctor, nurse or pharmacist before following any medical regimen to see if it is safe and effective for you.

## 2019-03-18 ENCOUNTER — HOSPITAL ENCOUNTER (OUTPATIENT)
Dept: LAB | Age: 74
Discharge: HOME | End: 2019-03-18
Payer: MEDICARE

## 2019-03-18 ENCOUNTER — OFFICE VISIT (OUTPATIENT)
Dept: FAMILY MEDICINE CLINIC | Age: 74
End: 2019-03-18

## 2019-03-18 VITALS
DIASTOLIC BLOOD PRESSURE: 88 MMHG | OXYGEN SATURATION: 99 % | TEMPERATURE: 98.3 F | RESPIRATION RATE: 20 BRPM | WEIGHT: 218.9 LBS | SYSTOLIC BLOOD PRESSURE: 147 MMHG | HEART RATE: 79 BPM | HEIGHT: 72 IN | BODY MASS INDEX: 29.65 KG/M2

## 2019-03-18 DIAGNOSIS — Z13.5 SCREENING FOR GLAUCOMA: ICD-10-CM

## 2019-03-18 DIAGNOSIS — E53.8 B12 DEFICIENCY: ICD-10-CM

## 2019-03-18 DIAGNOSIS — D69.6 PLATELETS DECREASED (HCC): ICD-10-CM

## 2019-03-18 DIAGNOSIS — Z23 ENCOUNTER FOR IMMUNIZATION: ICD-10-CM

## 2019-03-18 DIAGNOSIS — R60.0 LOCALIZED EDEMA: Primary | ICD-10-CM

## 2019-03-18 DIAGNOSIS — I10 HYPERTENSION, UNSPECIFIED TYPE: ICD-10-CM

## 2019-03-18 PROCEDURE — 82607 VITAMIN B-12: CPT

## 2019-03-18 PROCEDURE — 36415 COLL VENOUS BLD VENIPUNCTURE: CPT

## 2019-03-18 PROCEDURE — 84443 ASSAY THYROID STIM HORMONE: CPT

## 2019-03-18 PROCEDURE — 82306 VITAMIN D 25 HYDROXY: CPT

## 2019-03-18 PROCEDURE — 80053 COMPREHEN METABOLIC PANEL: CPT

## 2019-03-18 PROCEDURE — 80061 LIPID PANEL: CPT

## 2019-03-18 PROCEDURE — 85027 COMPLETE CBC AUTOMATED: CPT

## 2019-03-18 RX ORDER — POTASSIUM CHLORIDE 20 MEQ/1
20 TABLET, EXTENDED RELEASE ORAL DAILY
Qty: 90 TAB | Refills: 1 | Status: SHIPPED | OUTPATIENT
Start: 2019-03-18 | End: 2019-06-18 | Stop reason: SDUPTHER

## 2019-03-18 RX ORDER — CYANOCOBALAMIN 1000 UG/ML
1000 INJECTION, SOLUTION INTRAMUSCULAR; SUBCUTANEOUS ONCE
Qty: 1 ML | Refills: 0
Start: 2019-03-18 | End: 2019-03-18

## 2019-03-18 RX ORDER — FUROSEMIDE 40 MG/1
40 TABLET ORAL DAILY
Qty: 90 TAB | Refills: 1 | Status: SHIPPED | OUTPATIENT
Start: 2019-03-18 | End: 2019-06-18 | Stop reason: SDUPTHER

## 2019-03-18 NOTE — PROGRESS NOTES
HISTORY OF PRESENT ILLNESS  Krystal Bates is a 69 y.o. male. HPI   b12 def   Currently on oral tablet daily, less fatigued more active,  no etoh intake, on no multivitamin daily, no cigs    Patient present with b/lSwelling of the lower ext,     Patient complains of edema. The location of the edema is lower leg(s) bilateral, ankle(s) bilateral, feet bilateral.  The edema has been moderate,The patient states the problem is long-standing. The swelling has been aggravated by dependency of involved area, increased salt intake, risk factors include the pt's gender, hypertension. on the pt last visits, the patient did not have much of the legs swelling, the weight was also better, Today the patient denies leg pain, denies rash, and legs lesion,and the denial of dizziness,   the wt went up by close to 10 pounds, pt states that he ran out of his lasix for couple of months but has been taking the big pill,   Vitals 3/18/2019 1/15/2019 12/18/2018 9/18/2018   Weight 218 lb 14.4 oz 207 lb 9.6 oz 213 lb 3.2 oz 209 lb 3.2 oz     Vitals 6/19/2018   Weight 210 lb 6.4 oz       HTN  Today pt present for Bp check and the patient stating that so far there has been a Compliancy w/ the bp meds, having had the low salt diet  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 90 Tab 0    potassium chloride (K-DUR, KLOR-CON) 20 mEq tablet Take 1 Tab by mouth daily.  90 Tab 0    aspirin (CHILDREN'S ASPIRIN) 81 mg chewable tablet TAKE ONE TABLET BY MOUTH EVERY DAY 30 Tab 11    cholecalciferol (VITAMIN D3) 1,000 unit cap One tab daily 30 Cap 11     No Known Allergies  Past Medical History:   Diagnosis Date    Elevated PSA, less than 10 ng/ml 12/10/2012    Hypertension     Hypokalemia 7/25/2014    Localized edema 2/7/2017    Patient is full code 9/18/2018    Vitamin D deficiency 7/8/2014    WBC decreased 7/6/2012     Past Surgical History:   Procedure Laterality Date    COLONOSCOPY,DIAGNOSTIC  2/5/2015         HX ORTHOPAEDIC      left knee surg in  1984     Family History   Problem Relation Age of Onset    Kidney Disease Father      Social History     Tobacco Use    Smoking status: Never Smoker    Smokeless tobacco: Never Used   Substance Use Topics    Alcohol use: No      Lab Results   Component Value Date/Time    WBC 3.8 09/18/2018 10:38 AM    HGB 14.6 09/18/2018 10:38 AM    HCT 45.0 09/18/2018 10:38 AM    PLATELET 426 (L) 50/62/5629 10:38 AM    MCV 88 09/18/2018 10:38 AM     Lab Results   Component Value Date/Time    GFR est non-AA 73 12/18/2018 09:25 AM    GFR est AA 84 12/18/2018 09:25 AM    Creatinine 1.03 12/18/2018 09:25 AM    BUN 10 12/18/2018 09:25 AM    Sodium 144 12/18/2018 09:25 AM    Potassium 3.8 12/18/2018 09:25 AM    Chloride 104 12/18/2018 09:25 AM    CO2 26 12/18/2018 09:25 AM    Magnesium 2.1 01/13/2016 09:03 AM        Review of Systems   Constitutional: Negative for chills and fever. HENT: Negative for ear pain and nosebleeds. Eyes: Negative for blurred vision, pain and discharge. Respiratory: Negative for shortness of breath. Cardiovascular: Positive for leg swelling. Negative for chest pain. Gastrointestinal: Negative for constipation, diarrhea, nausea and vomiting. Genitourinary: Negative for frequency. Musculoskeletal: Negative for joint pain. Skin: Negative for itching and rash. Neurological: Negative for headaches. Psychiatric/Behavioral: Negative for depression. The patient is not nervous/anxious. Physical Exam   Constitutional: He is oriented to person, place, and time. He appears well-developed and well-nourished. HENT:   Head: Normocephalic and atraumatic. Mouth/Throat: No oropharyngeal exudate.    Eyes: Conjunctivae and EOM are normal.   Neck: Normal range of motion. Neck supple. Cardiovascular: Normal rate, regular rhythm and normal heart sounds. No murmur heard. Pulmonary/Chest: Effort normal and breath sounds normal. No respiratory distress. Abdominal: Soft. Bowel sounds are normal. He exhibits no distension. There is no rebound. Musculoskeletal: He exhibits edema. He exhibits no tenderness. Right ankle: He exhibits swelling. Left ankle: He exhibits swelling. Right lower leg: He exhibits swelling. Left lower leg: He exhibits swelling. 3+++ bilateral below the knees   Neurological: He is alert and oriented to person, place, and time. Skin: Skin is warm. No erythema. Psychiatric: He has a normal mood and affect. His behavior is normal.   Nursing note and vitals reviewed. ASSESSMENT and PLAN  Diagnoses and all orders for this visit:    1. Localized edema  -     CBC W/O DIFF  -     LIPID PANEL  -     TSH 3RD GENERATION  -     VITAMIN B12 & FOLATE  -     VITAMIN D, 25 HYDROXY  -     METABOLIC PANEL, COMPREHENSIVE  -     furosemide (LASIX) 40 mg tablet; Take 1 Tab by mouth daily. -     potassium chloride (K-DUR, KLOR-CON) 20 mEq tablet; Take 1 Tab by mouth daily. -     VITAMIN B12 INJECTION  -     THER/PROPH/DIAG INJECTION, SUBCUT/IM    2. Hypertension, unspecified type  -     CBC W/O DIFF  -     LIPID PANEL  -     TSH 3RD GENERATION  -     VITAMIN B12 & FOLATE  -     VITAMIN D, 25 HYDROXY  -     METABOLIC PANEL, COMPREHENSIVE  -     furosemide (LASIX) 40 mg tablet; Take 1 Tab by mouth daily. -     potassium chloride (K-DUR, KLOR-CON) 20 mEq tablet; Take 1 Tab by mouth daily.   -     VITAMIN B12 INJECTION  -     THER/PROPH/DIAG INJECTION, SUBCUT/IM    3. B12 deficiency  -     VITAMIN B12 & FOLATE  -     VITAMIN D, 25 HYDROXY  -     METABOLIC PANEL, COMPREHENSIVE  -     VITAMIN B12 INJECTION  -     THER/PROPH/DIAG INJECTION, SUBCUT/IM    4. Screening for glaucoma  - REFERRAL TO OPTOMETRY    5. Encounter for immunization  -     varicella-zoster recombinant, PF, (SHINGRIX) 50 mcg/0.5 mL susr injection; 0.5 mL by IntraMUSCular route once for 1 dose. 6. Platelets decreased ([address]    Other orders  -     cyanocobalamin (VITAMIN B-12) 1,000 mcg/mL injection; 1 mL by IntraMUSCular route once for 1 dose. HTN not controlled patient has been of his loop diuretic, refill given no change of bp meds at this time,  Discussed sodium restriction, high k rich diet,  maintaining ideal body weight and regular exercise program such as daily walking 30 min perday 4-5 times per week,  medication compliance advised, was told to call back for rfs or of any concern  Leg elevation at all times or most of the times, support hoses b/l 24hrs per day, ambulation as possible, use of two pillows at nights while in bed,  Pt was also referred to cardio but refused at this time      Discussed the patient's BMI with him. The BMI follow up plan is as follows:     dietary management education, guidance, and counseling  encourage exercise  monitor weight  prescribed dietary intake    An After Visit Summary was printed and given to the patient.

## 2019-03-18 NOTE — PATIENT INSTRUCTIONS
Vitamin B12: About This Test  What is it? This blood test measures the amount of vitamin B12 in your blood. Your body needs this B vitamin to make blood cells and to keep your nervous system healthy. Why is this test done? This test is used to:  · Check for vitamin B12 deficiency anemia, especially in those who have had stomach or intestinal surgery or who have small intestine problems or a family history of this anemia. · Diagnose the cause of certain types of anemia. · Help find the cause of a decrease in mental abilities or other nervous system symptoms, such as tingling or numbness of the arms or legs. How can you prepare for the test?  · In general, you don't need to prepare before having this test. Your doctor may give you some specific instructions. What happens during the test?  · A health professional takes a sample of your blood. What else should you know about the test?  · Your results will include an explanation of what a \"normal\" result is. This is called a \"reference range. \" It is just a guide. Your doctor will evaluate your results based on your health and other factors. This means that a value that falls outside the normal values listed may still be normal for you. · Vitamin B12 is usually measured at the same time as folic acid is tested, because a lack of either one can lead to a form of anemia called megaloblastic anemia. How long does the test take? · The test will take a few minutes. What happens after the test?  · You will probably be able to go home right away. · You can go back to your usual activities right away. Follow-up care is a key part of your treatment and safety. Be sure to make and go to all appointments, and call your doctor if you are having problems. It's also a good idea to keep a list of the medicines you take. Ask your doctor when you can expect to have your test results. Where can you learn more?   Go to http://tiffany-clare.info/. Enter Q213 in the search box to learn more about \"Vitamin B12: About This Test.\"  Current as of: May 6, 2018  Content Version: 11.9  © 2006-2018 [employer]. Care instructions adapted under license by [employer] (which disclaims liability or warranty for this information). If you have questions about a medical condition or this instruction, always ask your healthcare professional. [place] [address] any warranty or liability for your use of this information. Leg and Ankle Edema: Care Instructions  Your Care Instructions  Swelling in the legs, ankles, and feet is called edema. It is common after you sit or stand for a while. Long plane flights or car rides often cause swelling in the legs and feet. You may also have swelling if you have to stand for long periods of time at your job. Problems with the veins in the legs (varicose veins) and changes in hormones can also cause swelling. Sometimes the swelling in the ankles and feet is caused by a more serious problem, such as heart failure, infection, blood clots, or liver or kidney disease. Follow-up care is a key part of your treatment and safety. Be sure to make and go to all appointments, and call your doctor if you are having problems. It's also a good idea to know your test results and keep a list of the medicines you take. How can you care for yourself at home? · If your doctor gave you medicine, take it as prescribed. Call your doctor if you think you are having a problem with your medicine. · Whenever you are resting, raise your legs up. Try to keep the swollen area higher than the level of your heart. · Take breaks from standing or sitting in one position. ? Walk around to increase the blood flow in your lower legs. ? Move your feet and ankles often while you stand, or tighten and relax your leg muscles. · Wear support stockings.  Put them on in the morning, before swelling gets worse. · Eat a balanced diet. Lose weight if you need to. · Limit the amount of salt (sodium) in your diet. Salt holds fluid in the body and may increase swelling. When should you call for help? Call 911 anytime you think you may need emergency care. For example, call if:    · You have symptoms of a blood clot in your lung (called a pulmonary embolism). These may include:  ? Sudden chest pain. ? Trouble breathing. ? Coughing up blood.    Call your doctor now or seek immediate medical care if:    · You have signs of a blood clot, such as:  ? Pain in your calf, back of the knee, thigh, or groin. ? Redness and swelling in your leg or groin.     · You have symptoms of infection, such as:  ? Increased pain, swelling, warmth, or redness. ? Red streaks or pus. ? A fever.    Watch closely for changes in your health, and be sure to contact your doctor if:    · Your swelling is getting worse.     · You have new or worsening pain in your legs.     · You do not get better as expected. Where can you learn more? Go to http://tiffany-clare.info/. Enter M639 in the search box to learn more about \"Leg and Ankle Edema: Care Instructions. \"  Current as of: September 23, 2018  Content Version: 11.9  © 1732-4052 [employer]. Care instructions adapted under license by [employer] (which disclaims liability or warranty for this information). If you have questions about a medical condition or this instruction, always ask your healthcare professional. [place] [address] any warranty or liability for your use of this information. Body Mass Index: Care Instructions  Your Care Instructions    Body mass index (BMI) can help you see if your weight is raising your risk for health problems. It uses a formula to compare how much you weigh with how tall you are. · A BMI lower than 18.5 is considered underweight.   · A BMI between 18.5 and 24.9 is considered healthy. · A BMI between 25 and 29.9 is considered overweight. A BMI of 30 or higher is considered obese. If your BMI is in the normal range, it means that you have a lower risk for weight-related health problems. If your BMI is in the overweight or obese range, you may be at increased risk for weight-related health problems, such as high blood pressure, heart disease, stroke, arthritis or joint pain, and diabetes. If your BMI is in the underweight range, you may be at increased risk for health problems such as fatigue, lower protection (immunity) against illness, muscle loss, bone loss, hair loss, and hormone problems. BMI is just one measure of your risk for weight-related health problems. You may be at higher risk for health problems if you are not active, you eat an unhealthy diet, or you drink too much alcohol or use tobacco products. Follow-up care is a key part of your treatment and safety. Be sure to make and go to all appointments, and call your doctor if you are having problems. It's also a good idea to know your test results and keep a list of the medicines you take. How can you care for yourself at home? · Practice healthy eating habits. This includes eating plenty of fruits, vegetables, whole grains, lean protein, and low-fat dairy. · If your doctor recommends it, get more exercise. Walking is a good choice. Bit by bit, increase the amount you walk every day. Try for at least 30 minutes on most days of the week. · Do not smoke. Smoking can increase your risk for health problems. If you need help quitting, talk to your doctor about stop-smoking programs and medicines. These can increase your chances of quitting for good. · Limit alcohol to 2 drinks a day for men and 1 drink a day for women. Too much alcohol can cause health problems. If you have a BMI higher than 25  · Your doctor may do other tests to check your risk for weight-related health problems.  This may include measuring the distance around your waist. A waist measurement of more than 40 inches in men or 35 inches in women can increase the risk of weight-related health problems. · Talk with your doctor about steps you can take to stay healthy or improve your health. You may need to make lifestyle changes to lose weight and stay healthy, such as changing your diet and getting regular exercise. If you have a BMI lower than 18.5  · Your doctor may do other tests to check your risk for health problems. · Talk with your doctor about steps you can take to stay healthy or improve your health. You may need to make lifestyle changes to gain or maintain weight and stay healthy, such as getting more healthy foods in your diet and doing exercises to build muscle. Where can you learn more? Go to http://tiffany-clare.info/. Enter S176 in the search box to learn more about \"Body Mass Index: Care Instructions. \"  Current as of: October 13, 2016  Content Version: 11.4  © 8184-4870 [employer]. Care instructions adapted under license by [employer] (which disclaims liability or warranty for this information). If you have questions about a medical condition or this instruction, always ask your healthcare professional. [address] any warranty or liability for your use of this information.

## 2019-03-18 NOTE — PROGRESS NOTES
Name and  verified      Chief Complaint   Patient presents with    Vitamin B12 Deficiency     f/u     Patient declined flu vaccine. Health Maintenance reviewed-discussed with patient. 1. Have you been to the ER, urgent care clinic since your last visit? Hospitalized since your last visit? no    2. Have you seen or consulted any other health care providers outside of the [address] [place] [address] since your last visit? Include any pap smears or colon screening.  no

## 2019-03-19 LAB
25(OH)D3+25(OH)D2 SERPL-MCNC: 24.9 NG/ML (ref 30–100)
ALBUMIN SERPL-MCNC: 4.2 G/DL (ref 3.5–4.8)
ALBUMIN/GLOB SERPL: 1.8 {RATIO} (ref 1.2–2.2)
ALP SERPL-CCNC: 91 IU/L (ref 39–117)
ALT SERPL-CCNC: 18 IU/L (ref 0–44)
AST SERPL-CCNC: 23 IU/L (ref 0–40)
BILIRUB SERPL-MCNC: <0.2 MG/DL (ref 0–1.2)
BUN SERPL-MCNC: 12 MG/DL (ref 8–27)
BUN/CREAT SERPL: 12 (ref 10–24)
CALCIUM SERPL-MCNC: 9.1 MG/DL (ref 8.6–10.2)
CHLORIDE SERPL-SCNC: 104 MMOL/L (ref 96–106)
CHOLEST SERPL-MCNC: 150 MG/DL (ref 100–199)
CO2 SERPL-SCNC: 26 MMOL/L (ref 20–29)
CREAT SERPL-MCNC: 1 MG/DL (ref 0.76–1.27)
ERYTHROCYTE [DISTWIDTH] IN BLOOD BY AUTOMATED COUNT: 14.8 % (ref 12.3–15.4)
FOLATE SERPL-MCNC: 5.4 NG/ML
GLOBULIN SER CALC-MCNC: 2.4 G/DL (ref 1.5–4.5)
GLUCOSE SERPL-MCNC: 107 MG/DL (ref 65–99)
HCT VFR BLD AUTO: 44.2 % (ref 37.5–51)
HDLC SERPL-MCNC: 57 MG/DL
HGB BLD-MCNC: 14.5 G/DL (ref 13–17.7)
LDLC SERPL CALC-MCNC: 80 MG/DL (ref 0–99)
MCH RBC QN AUTO: 28.6 PG (ref 26.6–33)
MCHC RBC AUTO-ENTMCNC: 32.8 G/DL (ref 31.5–35.7)
MCV RBC AUTO: 87 FL (ref 79–97)
PLATELET # BLD AUTO: 134 X10E3/UL (ref 150–379)
POTASSIUM SERPL-SCNC: 4.3 MMOL/L (ref 3.5–5.2)
PROT SERPL-MCNC: 6.6 G/DL (ref 6–8.5)
RBC # BLD AUTO: 5.07 X10E6/UL (ref 4.14–5.8)
SODIUM SERPL-SCNC: 142 MMOL/L (ref 134–144)
TRIGL SERPL-MCNC: 63 MG/DL (ref 0–149)
TSH SERPL DL<=0.005 MIU/L-ACNC: 3.16 UIU/ML (ref 0.45–4.5)
VIT B12 SERPL-MCNC: >2000 PG/ML (ref 232–1245)
VLDLC SERPL CALC-MCNC: 13 MG/DL (ref 5–40)
WBC # BLD AUTO: 3.2 X10E3/UL (ref 3.4–10.8)

## 2019-04-15 ENCOUNTER — CLINICAL SUPPORT (OUTPATIENT)
Dept: FAMILY MEDICINE CLINIC | Age: 74
End: 2019-04-15

## 2019-04-15 DIAGNOSIS — E53.8 B12 DEFICIENCY: Primary | ICD-10-CM

## 2019-04-15 RX ORDER — CYANOCOBALAMIN 1000 UG/ML
1000 INJECTION, SOLUTION INTRAMUSCULAR; SUBCUTANEOUS ONCE
Qty: 1 ML | Refills: 0
Start: 2019-04-15 | End: 2019-04-15

## 2019-04-15 NOTE — PROGRESS NOTES
Chief Complaint   Patient presents with    Immunization/Injection     b12     After obtaining consent, and per orders of , b12 1000mcg/ml   given to  Left deltoid IM  . Patient instructed to remain in clinic for 15 minutes afterwards, and to report any adverse reaction to me immediately.  Patient did not have any adverse reactions during this office visit

## 2019-04-15 NOTE — PATIENT INSTRUCTIONS
Cyanocobalamin (Vitamin B-12) (By injection)   Cyanocobalamin (ouq-bq-ud-koe-BAL-a-min)  Treats vitamin B-12 deficiency. Brand Name(s): B-12 Compliance Injection Kit, B-12 Kit, Physicians EZ Use B-12 Compliance, Vitamin Deficiency Injectable System - B12   There may be other brand names for this medicine. When This Medicine Should Not Be Used: This medicine is not right for everyone. Do not use it if you had an allergic reaction to vitamin B-12. How to Use This Medicine:   Injectable  · Your doctor will prescribe your exact dose and tell you how often it should be given. This medicine is given as a shot into one of your muscles. · Missed dose: Call your doctor or pharmacist for instructions. Drugs and Foods to Avoid:   Ask your doctor or pharmacist before using any other medicine, including over-the-counter medicines, vitamins, and herbal products. · Do not use folic acid supplements unless your doctor says it is okay. Warnings While Using This Medicine:   · Tell your doctor if you are pregnant or breastfeeding, or if you have kidney disease. · Keep all medicine out of the reach of children. Never share your medicine with anyone. Possible Side Effects While Using This Medicine:   Call your doctor right away if you notice any of these side effects:  · Allergic reaction: Itching or hives, swelling in your face or hands, swelling or tingling in your mouth or throat, chest tightness, trouble breathing  If you notice other side effects that you think are caused by this medicine, tell your doctor. Call your doctor for medical advice about side effects. You may report side effects to FDA at 5-769-FDA-7088  © 2017 [address] Information is for End User's use only and may not be sold, redistributed or otherwise used for commercial purposes. The above information is an  only. It is not intended as medical advice for individual conditions or treatments.  Talk to your doctor, nurse or pharmacist before following any medical regimen to see if it is safe and effective for you.

## 2019-05-13 ENCOUNTER — CLINICAL SUPPORT (OUTPATIENT)
Dept: FAMILY MEDICINE CLINIC | Age: 74
End: 2019-05-13

## 2019-05-13 VITALS
HEART RATE: 84 BPM | OXYGEN SATURATION: 98 % | DIASTOLIC BLOOD PRESSURE: 88 MMHG | RESPIRATION RATE: 20 BRPM | TEMPERATURE: 97.8 F | SYSTOLIC BLOOD PRESSURE: 149 MMHG

## 2019-05-13 DIAGNOSIS — E53.8 VITAMIN B 12 DEFICIENCY: Primary | ICD-10-CM

## 2019-05-13 RX ORDER — CYANOCOBALAMIN 1000 UG/ML
1000 INJECTION, SOLUTION INTRAMUSCULAR; SUBCUTANEOUS ONCE
Qty: 1 ML | Refills: 0
Start: 2019-05-13 | End: 2019-05-13

## 2019-05-13 NOTE — PATIENT INSTRUCTIONS
Cyanocobalamin (Vitamin B-12) (By injection)   Cyanocobalamin (ksu-jk-yh-koe-BAL-a-min)  Treats vitamin B-12 deficiency. Brand Name(s): B-12 Compliance Injection Kit, B-12 Kit, Physicians EZ Use B-12 Compliance, Vitamin Deficiency Injectable System - B12   There may be other brand names for this medicine. When This Medicine Should Not Be Used: This medicine is not right for everyone. Do not use it if you had an allergic reaction to vitamin B-12. How to Use This Medicine:   Injectable  · Your doctor will prescribe your exact dose and tell you how often it should be given. This medicine is given as a shot into one of your muscles. · Missed dose: Call your doctor or pharmacist for instructions. Drugs and Foods to Avoid:   Ask your doctor or pharmacist before using any other medicine, including over-the-counter medicines, vitamins, and herbal products. · Do not use folic acid supplements unless your doctor says it is okay. Warnings While Using This Medicine:   · Tell your doctor if you are pregnant or breastfeeding, or if you have kidney disease. · Keep all medicine out of the reach of children. Never share your medicine with anyone. Possible Side Effects While Using This Medicine:   Call your doctor right away if you notice any of these side effects:  · Allergic reaction: Itching or hives, swelling in your face or hands, swelling or tingling in your mouth or throat, chest tightness, trouble breathing  If you notice other side effects that you think are caused by this medicine, tell your doctor. Call your doctor for medical advice about side effects. You may report side effects to FDA at 2-243-FDA-2509  © 2017 [facility] Information is for End User's use only and may not be sold, redistributed or otherwise used for commercial purposes. The above information is an  only. It is not intended as medical advice for individual conditions or treatments.  Talk to your doctor, nurse or pharmacist before following any medical regimen to see if it is safe and effective for you.

## 2019-06-18 ENCOUNTER — HOSPITAL ENCOUNTER (OUTPATIENT)
Dept: LAB | Age: 74
Discharge: HOME | End: 2019-06-18
Payer: MEDICARE

## 2019-06-18 ENCOUNTER — OFFICE VISIT (OUTPATIENT)
Dept: FAMILY MEDICINE CLINIC | Age: 74
End: 2019-06-18

## 2019-06-18 VITALS
SYSTOLIC BLOOD PRESSURE: 130 MMHG | DIASTOLIC BLOOD PRESSURE: 86 MMHG | TEMPERATURE: 97.4 F | BODY MASS INDEX: 29.31 KG/M2 | OXYGEN SATURATION: 97 % | HEART RATE: 79 BPM | HEIGHT: 72 IN | WEIGHT: 216.4 LBS | RESPIRATION RATE: 16 BRPM

## 2019-06-18 DIAGNOSIS — E53.8 VITAMIN B12 DEFICIENCY NEUROPATHY (HCC): ICD-10-CM

## 2019-06-18 DIAGNOSIS — E53.8 B12 DEFICIENCY: ICD-10-CM

## 2019-06-18 DIAGNOSIS — D69.6 PLATELETS DECREASED (HCC): ICD-10-CM

## 2019-06-18 DIAGNOSIS — G63 VITAMIN B12 DEFICIENCY NEUROPATHY (HCC): ICD-10-CM

## 2019-06-18 DIAGNOSIS — R60.0 LOCALIZED EDEMA: Primary | ICD-10-CM

## 2019-06-18 DIAGNOSIS — I10 HYPERTENSION, UNSPECIFIED TYPE: ICD-10-CM

## 2019-06-18 PROCEDURE — 36415 COLL VENOUS BLD VENIPUNCTURE: CPT

## 2019-06-18 PROCEDURE — 85027 COMPLETE CBC AUTOMATED: CPT

## 2019-06-18 PROCEDURE — 82607 VITAMIN B-12: CPT

## 2019-06-18 PROCEDURE — 80053 COMPREHEN METABOLIC PANEL: CPT

## 2019-06-18 PROCEDURE — 84443 ASSAY THYROID STIM HORMONE: CPT

## 2019-06-18 RX ORDER — FUROSEMIDE 40 MG/1
40 TABLET ORAL DAILY
Qty: 90 TAB | Refills: 1 | Status: SHIPPED | OUTPATIENT
Start: 2019-06-18 | End: 2019-09-18 | Stop reason: ALTCHOICE

## 2019-06-18 RX ORDER — DOCUSATE SODIUM 100 MG
CAPSULE ORAL
Qty: 1 BOTTLE | Refills: 6 | Status: SHIPPED | OUTPATIENT
Start: 2019-06-18 | End: 2021-01-22 | Stop reason: ALTCHOICE

## 2019-06-18 RX ORDER — CYANOCOBALAMIN 1000 UG/ML
1000 INJECTION, SOLUTION INTRAMUSCULAR; SUBCUTANEOUS ONCE
Qty: 1 ML | Refills: 0
Start: 2019-06-18 | End: 2019-06-18

## 2019-06-18 RX ORDER — POTASSIUM CHLORIDE 20 MEQ/1
20 TABLET, EXTENDED RELEASE ORAL DAILY
Qty: 90 TAB | Refills: 1 | Status: SHIPPED | OUTPATIENT
Start: 2019-06-18 | End: 2019-09-18 | Stop reason: SDUPTHER

## 2019-06-18 RX ORDER — METOLAZONE 5 MG/1
5 TABLET ORAL DAILY
Qty: 90 TAB | Refills: 1 | Status: SHIPPED | OUTPATIENT
Start: 2019-06-18 | End: 2019-09-18 | Stop reason: SDUPTHER

## 2019-06-18 NOTE — PATIENT INSTRUCTIONS
Cyanocobalamin (Vitamin B-12) (By injection) Cyanocobalamin ([id number]-[id number]-pm-[id number]-BAL-a-min) Treats vitamin B-12 deficiency. Brand Name(s): B-12 Compliance Injection Kit, B-12 Kit, Physicians EZ Use B-12 Compliance, Vitamin Deficiency Injectable System - B12 There may be other brand names for this medicine. When This Medicine Should Not Be Used: This medicine is not right for everyone. Do not use it if you had an allergic reaction to vitamin B-12. How to Use This Medicine:  
Injectable · Your doctor will prescribe your exact dose and tell you how often it should be given. This medicine is given as a shot into one of your muscles. · Missed dose: Call your doctor or pharmacist for instructions. Drugs and Foods to Avoid: Ask your doctor or pharmacist before using any other medicine, including over-the-counter medicines, vitamins, and herbal products. · Do not use folic acid supplements unless your doctor says it is okay. Warnings While Using This Medicine: · Tell your doctor if you are pregnant or breastfeeding, or if you have kidney disease. · Keep all medicine out of the reach of children. Never share your medicine with anyone. Possible Side Effects While Using This Medicine:  
Call your doctor right away if you notice any of these side effects: · Allergic reaction: Itching or hives, swelling in your face or hands, swelling or tingling in your mouth or throat, chest tightness, trouble breathing If you notice other side effects that you think are caused by this medicine, tell your doctor. Call your doctor for medical advice about side effects. You may report side effects to FDA at 2-138-FDA-1083 © 2017 [address] Information is for End User's use only and may not be sold, redistributed or otherwise used for commercial purposes. The above information is an  only. It is not intended as medical advice for individual conditions or treatments.  Talk to your doctor, nurse or pharmacist before following any medical regimen to see if it is safe and effective for you.

## 2019-06-18 NOTE — PROGRESS NOTES
HISTORY OF PRESENT ILLNESS  Yazan Gilliam is a 69 y.o. male. HPI     Patient complains of edema. The location of the edema is lower leg(s) bilateral, ankle(s) bilateral, feet bilateral.  The edema has been moderate. Onset of symptoms was a few weeks ago, gradually worsening since that time. The edema is present all day. The patient states the problem is long-standing. on the pt last visits, the patient did have much of the legs swelling, the weight was also worse Today the patient denies leg pain, denies rash, and legs lesion,and the denial of dizziness,         216 lb 6.4 oz (98.2 kg) -- 218 lb 14.4     b12 def   Currently on oral tablet daily, less fatigued more active, no metformin no etoh intake, on no multivitamin daily    HTN  Today pt present for Bp check and++Compliancy w/ the bp meds, having had the low salt diet ,  has been active, patien does not obtain the bp at home ,, today the pt denies Chest Pain, has no legs swelling no lightheadedness,            Current Outpatient Medications   Medication Sig Dispense Refill    furosemide (LASIX) 40 mg tablet Take 1 Tab by mouth daily. 90 Tab 1    potassium chloride (K-DUR, KLOR-CON) 20 mEq tablet Take 1 Tab by mouth daily.  90 Tab 1    aspirin (CHILDREN'S ASPIRIN) 81 mg chewable tablet TAKE ONE TABLET BY MOUTH EVERY DAY 30 Tab 11    cholecalciferol (VITAMIN D3) 1,000 unit cap One tab daily 30 Cap 11     No Known Allergies  Past Medical History:   Diagnosis Date    Elevated PSA, less than 10 ng/ml 12/10/2012    Hypertension     Hypokalemia 7/25/2014    Localized edema 2/7/2017    Patient is full code 9/18/2018    Vitamin B12 deficiency neuropathy ([facility] [address] 6/18/2019    Vitamin D deficiency 7/8/2014    WBC decreased 7/6/2012     Past Surgical History:   Procedure Laterality Date    COLONOSCOPY,DIAGNOSTIC  2/5/2015         HX ORTHOPAEDIC      left knee surg in  1984     Family History   Problem Relation Age of Onset    Kidney Disease Father      Social History     Tobacco Use    Smoking status: Never Smoker    Smokeless tobacco: Never Used   Substance Use Topics    Alcohol use: No      Lab Results   Component Value Date/Time    WBC 3.2 (L) 03/18/2019 10:31 AM    HGB 14.5 03/18/2019 10:31 AM    HCT 44.2 03/18/2019 10:31 AM    PLATELET 636 (L) 99/07/1619 10:31 AM    MCV 87 03/18/2019 10:31 AM     Lab Results   Component Value Date/Time    GFR est non-AA 75 03/18/2019 10:31 AM    GFR est AA 87 03/18/2019 10:31 AM    Creatinine 1.00 03/18/2019 10:31 AM    BUN 12 03/18/2019 10:31 AM    Sodium 142 03/18/2019 10:31 AM    Potassium 4.3 03/18/2019 10:31 AM    Chloride 104 03/18/2019 10:31 AM    CO2 26 03/18/2019 10:31 AM    Magnesium 2.1 01/13/2016 09:03 AM        Review of Systems   Constitutional: Positive for malaise/fatigue. Negative for chills and fever. HENT: Negative for ear pain and nosebleeds. Eyes: Negative for blurred vision, pain and discharge. Respiratory: Negative for shortness of breath. Cardiovascular: Positive for leg swelling. Negative for chest pain. Gastrointestinal: Negative for constipation, diarrhea, nausea and vomiting. Genitourinary: Negative for frequency. Musculoskeletal: Negative for joint pain. Skin: Negative for itching and rash. Neurological: Positive for tingling. Negative for headaches. Psychiatric/Behavioral: Negative for depression. The patient is not nervous/anxious. Physical Exam   Constitutional: He is oriented to person, place, and time. He appears well-developed and well-nourished. HENT:   Head: Normocephalic and atraumatic. Mouth/Throat: No oropharyngeal exudate. Eyes: Conjunctivae and EOM are normal.   Neck: Normal range of motion. Neck supple. Cardiovascular: Normal rate, regular rhythm and normal heart sounds. No murmur heard. Pulmonary/Chest: Effort normal and breath sounds normal. No respiratory distress. Abdominal: Soft. Bowel sounds are normal. He exhibits no distension.  There is no rebound. Musculoskeletal: He exhibits edema and tenderness. Neurological: He is alert and oriented to person, place, and time. Skin: Skin is warm. No erythema. Psychiatric: He has a normal mood and affect. His behavior is normal.   Nursing note and vitals reviewed. ASSESSMENT and PLAN  Diagnoses and all orders for this visit:    1. Platelets decreased (HCC)  -     furosemide (LASIX) 40 mg tablet; Take 1 Tab by mouth daily. -     potassium chloride (K-DUR, KLOR-CON) 20 mEq tablet; Take 1 Tab by mouth daily. Indications: prevention of low potassium in the blood  -     VITAMIN B12 INJECTION  -     THER/PROPH/DIAG INJECTION, SUBCUT/IM  -     cyanocobalamin (VITAMIN B-12) 1,000 mcg/mL injection; 1 mL by IntraMUSCular route once for 1 dose. -     metOLazone (ZAROXOLYN) 5 mg tablet; Take 1 Tab by mouth daily.  -     METABOLIC PANEL, COMPREHENSIVE  -     CBC W/O DIFF  -     TSH 3RD GENERATION  -     VITAMIN B12 & FOLATE    2. Localized edema  -     furosemide (LASIX) 40 mg tablet; Take 1 Tab by mouth daily. -     potassium chloride (K-DUR, KLOR-CON) 20 mEq tablet; Take 1 Tab by mouth daily. Indications: prevention of low potassium in the blood  -     VITAMIN B12 INJECTION  -     THER/PROPH/DIAG INJECTION, SUBCUT/IM  -     cyanocobalamin (VITAMIN B-12) 1,000 mcg/mL injection; 1 mL by IntraMUSCular route once for 1 dose. -     metOLazone (ZAROXOLYN) 5 mg tablet; Take 1 Tab by mouth daily.  -     METABOLIC PANEL, COMPREHENSIVE  -     CBC W/O DIFF  -     TSH 3RD GENERATION  -     VITAMIN B12 & FOLATE    3. Hypertension, unspecified type  -     furosemide (LASIX) 40 mg tablet; Take 1 Tab by mouth daily. -     potassium chloride (K-DUR, KLOR-CON) 20 mEq tablet; Take 1 Tab by mouth daily.  Indications: prevention of low potassium in the blood  -     VITAMIN B12 INJECTION  -     THER/PROPH/DIAG INJECTION, SUBCUT/IM  -     cyanocobalamin (VITAMIN B-12) 1,000 mcg/mL injection; 1 mL by IntraMUSCular route once for 1 dose. -     metOLazone (ZAROXOLYN) 5 mg tablet; Take 1 Tab by mouth daily.  -     METABOLIC PANEL, COMPREHENSIVE  -     CBC W/O DIFF  -     TSH 3RD GENERATION  -     VITAMIN B12 & FOLATE    4. B12 deficiency  -     furosemide (LASIX) 40 mg tablet; Take 1 Tab by mouth daily. -     potassium chloride (K-DUR, KLOR-CON) 20 mEq tablet; Take 1 Tab by mouth daily. Indications: prevention of low potassium in the blood  -     VITAMIN B12 INJECTION  -     THER/PROPH/DIAG INJECTION, SUBCUT/IM  -     cyanocobalamin (VITAMIN B-12) 1,000 mcg/mL injection; 1 mL by IntraMUSCular route once for 1 dose. -     metOLazone (ZAROXOLYN) 5 mg tablet; Take 1 Tab by mouth daily.  -     METABOLIC PANEL, COMPREHENSIVE  -     CBC W/O DIFF  -     TSH 3RD GENERATION  -     VITAMIN B12 & FOLATE    5. Vitamin B12 deficiency neuropathy (HCC)  -     furosemide (LASIX) 40 mg tablet; Take 1 Tab by mouth daily. -     potassium chloride (K-DUR, KLOR-CON) 20 mEq tablet; Take 1 Tab by mouth daily. Indications: prevention of low potassium in the blood  -     VITAMIN B12 INJECTION  -     THER/PROPH/DIAG INJECTION, SUBCUT/IM  -     cyanocobalamin (VITAMIN B-12) 1,000 mcg/mL injection; 1 mL by IntraMUSCular route once for 1 dose. -     metOLazone (ZAROXOLYN) 5 mg tablet; Take 1 Tab by mouth daily.  -     METABOLIC PANEL, COMPREHENSIVE  -     CBC W/O DIFF  -     TSH 3RD GENERATION  -     VITAMIN B12 & FOLATE    Other orders  -     varicella zoster vaccine live (VARICELLA-ZOSTER VACINE LIVE) 19,400 unit/0.65 mL susr injection; 1 Vial by SubCUTAneous route once for 1 dose. -     electrolytes-dextrose (PEDIALYTE) soln solution;  One bottle of 237ml daily

## 2019-06-18 NOTE — PROGRESS NOTES
Chief Complaint   Patient presents with    Hypertension     1. Have you been to the ER, urgent care clinic since your last visit? Hospitalized since your last visit? No    2. Have you seen or consulted any other health care providers outside of the [address] [place] [address] since your last visit? Include any pap smears or colon screening. No    After obtaining consent, and per orders of , b12 1000mcg/ml given to  Left deltoid IM  . Patient instructed to remain in clinic for 15 minutes afterwards, and to report any adverse reaction to me immediately.  Patient did not have any adverse reactions during this office visit

## 2019-06-19 LAB
ALBUMIN SERPL-MCNC: 4.4 G/DL (ref 3.5–4.8)
ALBUMIN/GLOB SERPL: 2 {RATIO} (ref 1.2–2.2)
ALP SERPL-CCNC: 92 IU/L (ref 39–117)
ALT SERPL-CCNC: 18 IU/L (ref 0–44)
AST SERPL-CCNC: 26 IU/L (ref 0–40)
BILIRUB SERPL-MCNC: 0.4 MG/DL (ref 0–1.2)
BUN SERPL-MCNC: 8 MG/DL (ref 8–27)
BUN/CREAT SERPL: 8 (ref 10–24)
CALCIUM SERPL-MCNC: 9.6 MG/DL (ref 8.6–10.2)
CHLORIDE SERPL-SCNC: 102 MMOL/L (ref 96–106)
CO2 SERPL-SCNC: 27 MMOL/L (ref 20–29)
CREAT SERPL-MCNC: 0.98 MG/DL (ref 0.76–1.27)
ERYTHROCYTE [DISTWIDTH] IN BLOOD BY AUTOMATED COUNT: 14.5 % (ref 12.3–15.4)
FOLATE SERPL-MCNC: 6.7 NG/ML
GLOBULIN SER CALC-MCNC: 2.2 G/DL (ref 1.5–4.5)
GLUCOSE SERPL-MCNC: 97 MG/DL (ref 65–99)
HCT VFR BLD AUTO: 42 % (ref 37.5–51)
HGB BLD-MCNC: 14.2 G/DL (ref 13–17.7)
MCH RBC QN AUTO: 28.6 PG (ref 26.6–33)
MCHC RBC AUTO-ENTMCNC: 33.8 G/DL (ref 31.5–35.7)
MCV RBC AUTO: 85 FL (ref 79–97)
PLATELET # BLD AUTO: 133 X10E3/UL (ref 150–450)
POTASSIUM SERPL-SCNC: 4.3 MMOL/L (ref 3.5–5.2)
PROT SERPL-MCNC: 6.6 G/DL (ref 6–8.5)
RBC # BLD AUTO: 4.97 X10E6/UL (ref 4.14–5.8)
SODIUM SERPL-SCNC: 144 MMOL/L (ref 134–144)
TSH SERPL DL<=0.005 MIU/L-ACNC: 2.96 UIU/ML (ref 0.45–4.5)
VIT B12 SERPL-MCNC: >2000 PG/ML (ref 232–1245)
WBC # BLD AUTO: 4.2 X10E3/UL (ref 3.4–10.8)

## 2019-09-18 ENCOUNTER — OFFICE VISIT (OUTPATIENT)
Dept: FAMILY MEDICINE CLINIC | Age: 74
End: 2019-09-18

## 2019-09-18 ENCOUNTER — HOSPITAL ENCOUNTER (OUTPATIENT)
Dept: LAB | Age: 74
Discharge: HOME | End: 2019-09-18
Payer: MEDICARE

## 2019-09-18 VITALS
OXYGEN SATURATION: 99 % | RESPIRATION RATE: 20 BRPM | BODY MASS INDEX: 25.33 KG/M2 | WEIGHT: 187 LBS | TEMPERATURE: 97.3 F | SYSTOLIC BLOOD PRESSURE: 95 MMHG | HEART RATE: 61 BPM | HEIGHT: 72 IN | DIASTOLIC BLOOD PRESSURE: 61 MMHG

## 2019-09-18 DIAGNOSIS — G63 VITAMIN B12 DEFICIENCY NEUROPATHY (HCC): ICD-10-CM

## 2019-09-18 DIAGNOSIS — E53.8 B12 DEFICIENCY: ICD-10-CM

## 2019-09-18 DIAGNOSIS — Z00.00 MEDICARE ANNUAL WELLNESS VISIT, SUBSEQUENT: Primary | ICD-10-CM

## 2019-09-18 DIAGNOSIS — D69.6 PLATELETS DECREASED (HCC): ICD-10-CM

## 2019-09-18 DIAGNOSIS — Z11.59 NEED FOR HEPATITIS C SCREENING TEST: ICD-10-CM

## 2019-09-18 DIAGNOSIS — E53.8 VITAMIN B12 DEFICIENCY NEUROPATHY (HCC): ICD-10-CM

## 2019-09-18 DIAGNOSIS — Z13.31 SCREENING FOR DEPRESSION: ICD-10-CM

## 2019-09-18 DIAGNOSIS — Z12.11 SCREEN FOR COLON CANCER: ICD-10-CM

## 2019-09-18 DIAGNOSIS — Z71.89 ADVANCED DIRECTIVES, COUNSELING/DISCUSSION: ICD-10-CM

## 2019-09-18 DIAGNOSIS — R60.0 LOCALIZED EDEMA: ICD-10-CM

## 2019-09-18 DIAGNOSIS — Z23 ENCOUNTER FOR IMMUNIZATION: ICD-10-CM

## 2019-09-18 DIAGNOSIS — I10 HYPERTENSION, UNSPECIFIED TYPE: ICD-10-CM

## 2019-09-18 DIAGNOSIS — Z13.39 SCREENING FOR ALCOHOLISM: ICD-10-CM

## 2019-09-18 PROCEDURE — 85027 COMPLETE CBC AUTOMATED: CPT

## 2019-09-18 PROCEDURE — 80053 COMPREHEN METABOLIC PANEL: CPT

## 2019-09-18 PROCEDURE — 86803 HEPATITIS C AB TEST: CPT

## 2019-09-18 PROCEDURE — 80061 LIPID PANEL: CPT

## 2019-09-18 PROCEDURE — 84443 ASSAY THYROID STIM HORMONE: CPT

## 2019-09-18 RX ORDER — ASPIRIN 81 MG/1
TABLET ORAL DAILY
COMMUNITY

## 2019-09-18 RX ORDER — POTASSIUM CHLORIDE 20 MEQ/1
20 TABLET, EXTENDED RELEASE ORAL DAILY
Qty: 90 TAB | Refills: 1 | Status: SHIPPED | OUTPATIENT
Start: 2019-09-18 | End: 2019-12-18 | Stop reason: SDUPTHER

## 2019-09-18 RX ORDER — CYANOCOBALAMIN 1000 UG/ML
1000 INJECTION, SOLUTION INTRAMUSCULAR; SUBCUTANEOUS ONCE
Qty: 1 ML | Refills: 0
Start: 2019-09-18 | End: 2019-09-18

## 2019-09-18 RX ORDER — METOLAZONE 5 MG/1
5 TABLET ORAL DAILY
Qty: 90 TAB | Refills: 1 | Status: SHIPPED | OUTPATIENT
Start: 2019-09-18 | End: 2020-03-23 | Stop reason: SDUPTHER

## 2019-09-18 NOTE — PROGRESS NOTES
Name and  verified      Chief Complaint   Patient presents with   [address] [place] [address] Annual Wellness Visit       Order placed for flu vaccine, per Verbal Order from Dr. Cecy Gaytan on 2019 due to HM. Health Maintenance reviewed-discussed with patient. 1. Have you been to the ER, urgent care clinic since your last visit? Hospitalized since your last visit? no    2. Have you seen or consulted any other health care providers outside of the [address] [place] [address] since your last visit? Include any pap smears or colon screening.   no

## 2019-09-18 NOTE — PATIENT INSTRUCTIONS
Vaccine Information Statement Influenza (Flu) Vaccine (Inactivated or Recombinant): What You Need to Know Many Vaccine Information Statements are available in [place] and other languages. See www.immunize.org/vis Hojas de información sobre vacunas están disponibles en español y en muchos otros idiomas. Visite www.immunize.org/vis 1. Why get vaccinated? Influenza vaccine can prevent influenza (flu). Flu is a contagious disease that spreads around the [facility] every year, usually between October and May. Anyone can get the flu, but it is more dangerous for some people. Infants and young children, people 72years of age and older, pregnant women, and people with certain health conditions or a weakened immune system are at greatest risk of flu complications. Pneumonia, bronchitis, sinus infections and ear infections are examples of flu-related complications. If you have a medical condition, such as heart disease, cancer or diabetes, flu can make it worse. Flu can cause fever and chills, sore throat, muscle aches, fatigue, cough, headache, and runny or stuffy nose. Some people may have vomiting and diarrhea, though this is more common in children than adults. Each year thousands of people in the [facility] die from flu, and many more are hospitalized. Flu vaccine prevents millions of illnesses and flu-related visits to the doctor each year. 2. Influenza vaccines CDC recommends everyone 10months of age and older get vaccinated every flu season. Children 6 months through 6years of age may need 2 doses during a single flu season. Everyone else needs only 1 dose each flu season. It takes about 2 weeks for protection to develop after vaccination. There are many flu viruses, and they are always changing. Each year a new flu vaccine is made to protect against three or four viruses that are likely to cause disease in the upcoming flu season.  Even when the vaccine doesnt exactly match these viruses, it may still provide some protection. Influenza vaccine does not cause flu. Influenza vaccine may be given at the same time as other vaccines. 3. Talk with your health care provider Tell your vaccine provider if the person getting the vaccine: 
 Has had an allergic reaction after a previous dose of influenza vaccine, or has any severe, life-threatening allergies.  Has ever had Guillain-Barré Syndrome (also called GBS). In some cases, your health care provider may decide to postpone influenza vaccination to a future visit. People with minor illnesses, such as a cold, may be vaccinated. People who are moderately or severely ill should usually wait until they recover before getting influenza vaccine. Your health care provider can give you more information. 4. Risks of a reaction  Soreness, redness, and swelling where shot is given, fever, muscle aches, and headache can happen after influenza vaccine.  There may be a very small increased risk of Guillain-Barré Syndrome (GBS) after inactivated influenza vaccine (the flu shot). [place] children who get the flu shot along with pneumococcal vaccine (PCV13), and/or DTaP vaccine at the same time might be slightly more likely to have a seizure caused by fever. Tell your health care provider if a child who is getting flu vaccine has ever had a seizure. People sometimes faint after medical procedures, including vaccination. Tell your provider if you feel dizzy or have vision changes or ringing in the ears. As with any medicine, there is a very remote chance of a vaccine causing a severe allergic reaction, other serious injury, or death. 5. What if there is a serious problem? An allergic reaction could occur after the vaccinated person leaves the clinic.  If you see signs of a severe allergic reaction (hives, swelling of the face and throat, difficulty breathing, a fast heartbeat, dizziness, or weakness), call 9-1-1 and get the person to the nearest hospital. 
 
For other signs that concern you, call your health care provider. Adverse reactions should be reported to the Vaccine Adverse Event Reporting System (VAERS). Your health care provider will usually file this report, or you can do it yourself. Visit the VAERS website at www.vaers. hhs.gov or call 2-947.787.3529. VAERS is only for reporting reactions, and VAERS staff do not give medical advice. 6. The National Vaccine Injury Compensation Program 
 
The [facility] Vaccine Injury Compensation Program (VICP) is a federal program that was created to compensate people who may have been injured by certain vaccines. Visit the VICP website at www.hrsa.gov/vaccinecompensation or call 3-614.816.1502 to learn about the program and about filing a claim. There is a time limit to file a claim for compensation. 7. How can I learn more?  Ask your health care provider.  Call your local or state health department.  Contact the Centers for Disease Control and Prevention (CDC): 
- Call 3-614.254.3536 (7-321-YTG-INFO) or 
- Visit CDCs influenza website at www.cdc.gov/flu Vaccine Information Statement (Interim) Inactivated Influenza Vaccine 8/15/2019 
42 CLARISSA Marcial 872LD-[id number] Department of [employer] Centers for Disease Control and Prevention Office Use Only Medicare Wellness Visit, Male The best way to live healthy is to have a lifestyle where you eat a well-balanced diet, exercise regularly, limit alcohol use, and quit all forms of tobacco/nicotine, if applicable. Regular preventive services are another way to keep healthy. Preventive services (vaccines, screening tests, monitoring & exams) can help personalize your care plan, which helps you manage your own care. Screening tests can find health problems at the earliest stages, when they are easiest to treat. [address] Early follows the current, evidence-based guidelines published by the [facility] Amor Ruiz ([facility]) when recommending preventive services for our patients. Because we follow these guidelines, sometimes recommendations change over time as research supports it. (For example, a prostate screening blood test is no longer routinely recommended for men with no symptoms.) Of course, you and your doctor may decide to screen more often for some diseases, based on your risk and co-morbidities (chronic disease you are already diagnosed with). Preventive services for you include: - Medicare offers their members a free annual wellness visit, which is time for you and your primary care provider to discuss and plan for your preventive service needs. Take advantage of this benefit every year! 
-All adults over age 72 should receive the recommended pneumonia vaccines. Current USPSTF guidelines recommend a series of two vaccines for the best pneumonia protection.  
-All adults should have a flu vaccine yearly and an ECG. All adults age 61 and older should receive a shingles vaccine once in their lifetime.   
-All adults age 38-68 who are overweight should have a diabetes screening test once every three years.  
-Other screening tests & preventive services for persons with diabetes include: an eye exam to screen for diabetic retinopathy, a kidney function test, a foot exam, and stricter control over your cholesterol.  
-Cardiovascular screening for adults with routine risk involves an electrocardiogram (ECG) at intervals determined by the provider.  
-Colorectal cancer screening should be done for adults age 54-65 with no increased risk factors for colorectal cancer. There are a number of acceptable methods of screening for this type of cancer. Each test has its own benefits and drawbacks.  Discuss with your provider what is most appropriate for you during your annual wellness visit. The different tests include: colonoscopy (considered the best screening method), a fecal occult blood test, a fecal DNA test, and sigmoidoscopy. 
-All adults born between [facility] should be screened once for Hepatitis C. 
-An Abdominal Aortic Aneurysm (AAA) Screening is recommended for men age 73-68 who has ever smoked in their lifetime. Here is a list of your current Health Maintenance items (your personalized list of preventive services) with a due date: 
Health Maintenance Due Topic Date Due  Shingles Vaccine (1 of 2) 02/09/1997  Glaucoma Screening   02/09/2012 [address] [place] [address] Annual Well Visit  09/19/2019  Colon Cancer Screening  02/05/2020 Khang [address] What is a living will? A living will is a legal form you use to write down the kind of care you want at the end of your life. It is used by the health professionals who will treat you if you aren't able to decide for yourself. If you put your wishes in writing, your loved ones and others will know what kind of care you want. They won't need to guess. This can ease your mind and be helpful to others. A living will is not the same as an estate or property will. An estate will explains what you want to happen with your money and property after you die. Is a living will a legal document? A living will is a legal document. Each state has its own laws about living chicas. If you move to another state, make sure that your living will is legal in the state where you now live. Or you might use a universal form that has been approved by many states. This kind of form can sometimes be completed and stored online. Your electronic copy will then be available wherever you have a connection to the Internet. In most cases, doctors will respect your wishes even if you have a form from a different state. · You don't need an  to complete a living will.  But legal advice can be helpful if your state's laws are unclear, your health history is complicated, or your family can't agree on what should be in your living will. · You can change your living will at any time. Some people find that their wishes about end-of-life care change as their health changes. · In addition to making a living will, think about completing a medical power of  form. This form lets you name the person you want to make end-of-life treatment decisions for you (your \"health care agent\") if you're not able to. Many hospitals and nursing homes will give you the forms you need to complete a living will and a medical power of . · Your living will is used only if you can't make or communicate decisions for yourself anymore. If you become able to make decisions again, you can accept or refuse any treatment, no matter what you wrote in your living will. · Your state may offer an online registry. This is a place where you can store your living will online so the doctors and nurses who need to treat you can find it right away. What should you think about when creating a living will? Talk about your end-of-life wishes with your family members and your doctor. Let them know what you want. That way the people making decisions for you won't be surprised by your choices. Think about these questions as you make your living will: · Do you know enough about life support methods that might be used? If not, talk to your doctor so you know what might be done if you can't breathe on your own, your heart stops, or you're unable to swallow. · What things would you still want to be able to do after you receive life-support methods? Would you want to be able to walk? To speak? To eat on your own? To live without the help of machines? · If you have a choice, where do you want to be cared for? In your home? At a hospital or nursing home? · Do you want certain Caodaism practices performed if you become very ill? · If you have a choice at the end of your life, where would you prefer to die? At home? In a hospital or nursing home? Somewhere else? · Would you prefer to be buried or cremated? · Do you want your organs to be donated after you die? What should you do with your living will? · Make sure that your family members and your health care agent have copies of your living will. · Give your doctor a copy of your living will to keep in your medical record. If you have more than one doctor, make sure that each one has a copy. · You may want to put a copy of your living will where it can be easily found. Where can you learn more? Go to http://tiffany-clare.info/. Enter T162 in the search box to learn more about \"Learning About Living Caesar Freeze. \" Current as of: August 8, 2016 Content Version: 11.3 © 3084-6594 [employer]. Care instructions adapted under license by [employer] (which disclaims liability or warranty for this information). If you have questions about a medical condition or this instruction, always ask your healthcare professional. [place] [address] any warranty or liability for your use of this information. Preventing Falls: Care Instructions Your Care Instructions Getting around your home safely can be a challenge if you have injuries or health problems that make it easy for you to fall. Loose rugs and furniture in walkways are among the dangers for many older people who have problems walking or who have poor eyesight. People who have conditions such as arthritis, osteoporosis, or dementia also have to be careful not to fall. You can make your home safer with a few simple measures. Follow-up care is a key part of your treatment and safety. Be sure to make and go to all appointments, and call your doctor if you are having problems. It's also a good idea to know your test results and keep a list of the medicines you take. How can you care for yourself at home? Taking care of yourself · You may get dizzy if you do not drink enough water. To prevent dehydration, drink plenty of fluids, enough so that your urine is light yellow or clear like water. Choose water and other caffeine-free clear liquids. If you have kidney, heart, or liver disease and have to limit fluids, talk with your doctor before you increase the amount of fluids you drink. · Exercise regularly to improve your strength, muscle tone, and balance. Walk if you can. Swimming may be a good choice if you cannot walk easily. · Have your vision and hearing checked each year or any time you notice a change. If you have trouble seeing and hearing, you might not be able to avoid objects and could lose your balance. · Know the side effects of the medicines you take. Ask your doctor or pharmacist whether the medicines you take can affect your balance. Sleeping pills or sedatives can affect your balance. · Limit the amount of alcohol you drink. Alcohol can impair your balance and other senses. · Ask your doctor whether calluses or corns on your feet need to be removed. If you wear loose-fitting shoes because of calluses or corns, you can lose your balance and fall. · Talk to your doctor if you have numbness in your feet. Preventing falls at home · Remove raised doorway thresholds, throw rugs, and clutter. Repair loose carpet or raised areas in the floor. · Move furniture and electrical cords to keep them out of walking paths. · Use nonskid floor wax, and wipe up spills right away, especially on ceramic tile floors. · If you use a walker or cane, put rubber tips on it. If you use crutches, clean the bottoms of them regularly with an abrasive pad, such as steel wool. · Keep your house well lit, especially Annabel Session, and outside walkways. Use night-lights in areas such as hallways and bathrooms.  Add extra light switches or use remote switches (such as switches that go on or off when you clap your hands) to make it easier to turn lights on if you have to get up during the night. · Install sturdy handrails on stairways. · Move items in your cabinets so that the things you use a lot are on the lower shelves (about waist level). · Keep a cordless phone and a flashlight with new batteries by your bed. If possible, put a phone in each of the main rooms of your house, or carry a cell phone in case you fall and cannot reach a phone. Or, you can wear a device around your neck or wrist. You push a button that sends a signal for help. · Wear low-heeled shoes that fit well and give your feet good support. Use footwear with nonskid soles. Check the heels and soles of your shoes for wear. Repair or replace worn heels or soles. · Do not wear socks without shoes on wood floors. · Walk on the grass when the sidewalks are slippery. If you live in an area that gets snow and ice in the winter, sprinkle salt on slippery steps and sidewalks. Preventing falls in the bath · Install grab bars and nonskid mats inside and outside your shower or tub and near the toilet and sinks. · Use shower chairs and bath benches. · Use a hand-held shower head that will allow you to sit while showering. · Get into a tub or shower by putting the weaker leg in first. Get out of a tub or shower with your strong side first. 
· Repair loose toilet seats and consider installing a raised toilet seat to make getting on and off the toilet easier. · Keep your bathroom door unlocked while you are in the shower. Where can you learn more? Go to http://tiffany-clare.info/. Enter [id number] in the search box to learn more about \"Preventing Falls: Care Instructions. \" Current as of: March 16, 2018 Content Version: 11.8 © 0925-7141 Healthwise, [employer].  Care instructions adapted under license by [address] (which disclaims liability or warranty for this information). If you have questions about a medical condition or this instruction, always ask your healthcare professional. [address] any warranty or liability for your use of this information. Body Mass Index: Care Instructions Your Care Instructions Body mass index (BMI) can help you see if your weight is raising your risk for health problems. It uses a formula to compare how much you weigh with how tall you are. · A BMI lower than 18.5 is considered underweight. · A BMI between 18.5 and 24.9 is considered healthy. · A BMI between 25 and 29.9 is considered overweight. A BMI of 30 or higher is considered obese. If your BMI is in the normal range, it means that you have a lower risk for weight-related health problems. If your BMI is in the overweight or obese range, you may be at increased risk for weight-related health problems, such as high blood pressure, heart disease, stroke, arthritis or joint pain, and diabetes. If your BMI is in the underweight range, you may be at increased risk for health problems such as fatigue, lower protection (immunity) against illness, muscle loss, bone loss, hair loss, and hormone problems. BMI is just one measure of your risk for weight-related health problems. You may be at higher risk for health problems if you are not active, you eat an unhealthy diet, or you drink too much alcohol or use tobacco products. Follow-up care is a key part of your treatment and safety. Be sure to make and go to all appointments, and call your doctor if you are having problems. It's also a good idea to know your test results and keep a list of the medicines you take. How can you care for yourself at home? · Practice healthy eating habits. This includes eating plenty of fruits, vegetables, whole grains, lean protein, and low-fat dairy. · If your doctor recommends it, get more exercise. Walking is a good choice. Bit by bit, increase the amount you walk every day. Try for at least 30 minutes on most days of the week. · Do not smoke. Smoking can increase your risk for health problems. If you need help quitting, talk to your doctor about stop-smoking programs and medicines. These can increase your chances of quitting for good. · Limit alcohol to 2 drinks a day for men and 1 drink a day for women. Too much alcohol can cause health problems. If you have a BMI higher than 25 · Your doctor may do other tests to check your risk for weight-related health problems. This may include measuring the distance around your waist. A waist measurement of more than 40 inches in men or 35 inches in women can increase the risk of weight-related health problems. · Talk with your doctor about steps you can take to stay healthy or improve your health. You may need to make lifestyle changes to lose weight and stay healthy, such as changing your diet and getting regular exercise. If you have a BMI lower than 18.5 · Your doctor may do other tests to check your risk for health problems. · Talk with your doctor about steps you can take to stay healthy or improve your health. You may need to make lifestyle changes to gain or maintain weight and stay healthy, such as getting more healthy foods in your diet and doing exercises to build muscle. Where can you learn more? Go to http://tiffany-clare.info/. Enter S176 in the search box to learn more about \"Body Mass Index: Care Instructions. \" Current as of: October 13, 2016 Content Version: 11.4 © 7818-7474 Healthwise, Inc[employer]. Care instructions adapted under license by [employer] (which disclaims liability or warranty for this information).  If you have questions about a medical condition or this instruction, always ask your healthcare professional. Vaelntin Goldstein [facility] disclaims any warranty or liability for your use of this information. Cyanocobalamin (Vitamin B-12) (By injection) Cyanocobalamin (fzh-gy-mz-koe-BAL-a-min) Treats vitamin B-12 deficiency. Brand Name(s): B-12 Compliance Injection Kit, B-12 Kit, Physicians EZ Use B-12 Compliance, Vitamin Deficiency Injectable System - B12 There may be other brand names for this medicine. When This Medicine Should Not Be Used: This medicine is not right for everyone. Do not use it if you had an allergic reaction to vitamin B-12. How to Use This Medicine:  
Injectable · Your doctor will prescribe your exact dose and tell you how often it should be given. This medicine is given as a shot into one of your muscles. · Missed dose: Call your doctor or pharmacist for instructions. Drugs and Foods to Avoid: Ask your doctor or pharmacist before using any other medicine, including over-the-counter medicines, vitamins, and herbal products. · Do not use folic acid supplements unless your doctor says it is okay. Warnings While Using This Medicine: · Tell your doctor if you are pregnant or breastfeeding, or if you have kidney disease. · Keep all medicine out of the reach of children. Never share your medicine with anyone. Possible Side Effects While Using This Medicine:  
Call your doctor right away if you notice any of these side effects: · Allergic reaction: Itching or hives, swelling in your face or hands, swelling or tingling in your mouth or throat, chest tightness, trouble breathing If you notice other side effects that you think are caused by this medicine, tell your doctor. Call your doctor for medical advice about side effects. You may report side effects to FDA at 7-700-FDA-4261 © 2017 [facility] Information is for End User's use only and may not be sold, redistributed or otherwise used for commercial purposes. The above information is an  only.  It is not intended as medical advice for individual conditions or treatments. Talk to your doctor, nurse or pharmacist before following any medical regimen to see if it is safe and effective for you.

## 2019-09-18 NOTE — ACP (ADVANCE CARE PLANNING)
Advance Care Planning     Other Legally Authorized Decision Maker would be Kendal Ekaterinas mother brother eleazar lr  Spouse as SDM     For My patients who has currently great Decision Making Capacity:     Patient is on presence of no family member,, stated that the pt wants to be not DNR at this time,  The pt likes to be a full code individual,  The patient states that there is a lot of will to live,  Pt was given the form,   will sign and bring us a copy on the later date.

## 2019-09-18 NOTE — PROGRESS NOTES
After obtaining consent, and per orders of , b12 1000mcg/ml  given to  Left deltoid IM  . Patient instructed to remain in clinic for 15 minutes afterwards, and to report any adverse reaction to me immediately.  Patient did not have any adverse reactions during this office visit    Declines flu vaccine

## 2019-09-18 NOTE — PROGRESS NOTES
This is the Subsequent Medicare Annual Wellness Exam, performed 12 months or more after the Initial AWV or the last Subsequent AWV    I have reviewed the patient's medical history in detail and updated the computerized patient record. History     Present for CPE, last Complete Physical exam was 2018,  Up todate w/ all vaccination, last tetanus vaccine was in .      Patient has good supportive care at home, and feels safe no physical mental abuse, Medications causing fall and the risk for future fall screened specially if the continuation of some of the current meds continues is addressed,  the depression at this age addressed pt with improvig interests and enjoy to do things, not anxious not depressed, not wanted to heart self or others  pt at this visit, is physically functional with gait nl and nicely independent and walks w/out mobility device and, in addition mentaly is functional,  very Alert and oriented, The immunizations including flu, PNA, dTap r/w'd uptodate ,   BMI for the pt's age the nl BMI r/w'd and information given, bp was screened for abnormality,     Last psa exam was normal and was in 2017,  no family hx of prostate cancer    last colonoscopy was normal and was in , fit neg in 2017, no family hx of colon cancer  No past surgical hx,  last bone dexa scan was never  No family hx of breast cancer in a male,  father  of old age mother still living healthy, +++sexaully active and uses [employer], +++physically active,  compliant w/ meds, ++Rf needed for today for his meds.      No fall no etoh intake not feeling depressed nl interest to do things,  Last flu shot made him sick, this yr does refuse to get one,    Weight 187 lb (84.8 kg) 216 lb 6.4 oz            HTN  Today pt present for Bp check and the patient stating that so far there has been a Compliancy w/ the bp meds, having had the low salt diet and try to the best of pt's knowledge to avoid smoked meats, the patient has been active life style and does do the daily walking, in addition  , today the pt denies Chest Pain, has no legs swelling no lightheadedness,  the pat has not been feeling anxious, and  Has not been feeling stressed out, otherwise feeling better since the last visit    Patient has had history of great amount of swelling was given to diuretic today the swellings are all gone away currently on Lasix and Zaroxolyn once daily with potassium supplement  b12 def   Currently  mostly B12 injection, previously patient was taking oral B12 and patient states that the oral medication was not helping, the injection has been providing the patient more stamina patient is also feeling less fatigued more active, no metformin no etoh intake, on ++ multivitamin daily        Past Medical History:   Diagnosis Date    Elevated PSA, less than 10 ng/ml 12/10/2012    Hypertension     Hypokalemia 7/25/2014    Localized edema 2/7/2017    Patient is full code 9/18/2018    Vitamin B12 deficiency neuropathy ([facility] [address] 6/18/2019    Vitamin D deficiency 7/8/2014    WBC decreased 7/6/2012      Past Surgical History:   Procedure Laterality Date    COLONOSCOPY,DIAGNOSTIC  2/5/2015         HX COLONOSCOPY      HX ORTHOPAEDIC      left knee surg in  1984     Current Outpatient Medications   Medication Sig Dispense Refill    aspirin delayed-release 81 mg tablet Take  by mouth daily.  furosemide (LASIX) 40 mg tablet Take 1 Tab by mouth daily. 90 Tab 1    potassium chloride (K-DUR, KLOR-CON) 20 mEq tablet Take 1 Tab by mouth daily. Indications: prevention of low potassium in the blood 90 Tab 1    metOLazone (ZAROXOLYN) 5 mg tablet Take 1 Tab by mouth daily.  90 Tab 1    electrolytes-dextrose (PEDIALYTE) soln solution One bottle of 237ml daily 1 Bottle 6    aspirin (CHILDREN'S ASPIRIN) 81 mg chewable tablet TAKE ONE TABLET BY MOUTH EVERY DAY 30 Tab 11    cholecalciferol (VITAMIN D3) 1,000 unit cap One tab daily 30 Cap 11     No Known Allergies  Family History   Problem Relation Age of Onset    Kidney Disease Father      Social History     Tobacco Use    Smoking status: Never Smoker    Smokeless tobacco: Never Used   Substance Use Topics    Alcohol use: No     Patient Active Problem List   Diagnosis Code    HTN (hypertension) I10    B12 deficiency E53.8    Pre-diabetes R73.03    WBC decreased D72.819    Screening for colon cancer Z12.11    Elevated PSA, less than 10 ng/ml R97.20    Hypercholesteremia E78.00    Platelets decreased ([facility] [address] D69.6    Vitamin D deficiency E55.9    Hypokalemia E87.6    Localized edema R60.0    Patient is full code Z78.9    Vitamin B12 deficiency neuropathy ([facility] [address] E53.8, G63       Depression Risk Factor Screening:     3 most recent PHQ Screens 9/18/2019   Little interest or pleasure in doing things Not at all   Feeling down, depressed, irritable, or hopeless Not at all   Total Score PHQ 2 0     Alcohol Risk Factor Screening: You do not drink alcohol or very rarely. Functional Ability and Level of Safety:   Hearing Loss  Hearing is good. Activities of Daily Living  The home contains: handrails, grab bars and rugs  Patient does total self care    Fall Risk  Fall Risk Assessment, last 12 mths 6/18/2019   Able to walk? Yes   Fall in past 12 months?  No       Abuse Screen  Patient is not abused    Cognitive Screening   Evaluation of Cognitive Function:  Has your family/caregiver stated any concerns about your memory: no  Normal    Patient Care Team   Patient Care Team:  Sharyn Mims MD as PCP - General (Family Practice)    Assessment/Plan   Education and counseling provided:  Are appropriate based on today's review and evaluation  End-of-Life planning (with patient's consent)  Influenza Vaccine  Prostate cancer screening tests (PSA, covered annually)  Colorectal cancer screening tests  Medical nutrition therapy for individuals with diabetes or renal disease    Diagnoses and all orders for this visit: 1. Medicare annual wellness visit, subsequent  -     COLLECTION VENOUS BLOOD,VENIPUNCTURE    2. Advanced directives, counseling/discussion  -     ADVANCE CARE PLANNING FIRST 30 MINS  -     FULL CODE    3. Encounter for immunization  -     INFLUENZA VACCINE INACTIVATED (IIV), SUBUNIT, ADJUVANTED, IM  -     ADMIN INFLUENZA VIRUS VAC  -     INFLUENZA VIRUS VAC QUAD,SPLIT,PRESV FREE SYRINGE IM    4. Screening for alcoholism  -     ND ANNUAL ALCOHOL SCREEN 15 MIN    5. Screening for depression  -     DEPRESSION SCREEN ANNUAL    6. Screen for colon cancer  Comments:  last one was >10yrs ago  Orders:  -     REFERRAL FOR COLONOSCOPY  -     COLLECTION VENOUS BLOOD,VENIPUNCTURE    7. Need for hepatitis C screening test  -     HEPATITIS C AB  -     COLLECTION VENOUS BLOOD,VENIPUNCTURE    8. Vitamin B12 deficiency neuropathy (HCC)  -     potassium chloride (K-DUR, KLOR-CON) 20 mEq tablet; Take 1 Tab by mouth daily. Indications: prevention of low potassium in the blood  -     metOLazone (ZAROXOLYN) 5 mg tablet; Take 1 Tab by mouth daily. -     VITAMIN B12 INJECTION  -     THER/PROPH/DIAG INJECTION, SUBCUT/IM  -     cyanocobalamin (VITAMIN B-12) 1,000 mcg/mL injection; 1 mL by IntraMUSCular route once for 1 dose. -     CBC W/O DIFF  -     LIPID PANEL  -     TSH 3RD GENERATION  -     METABOLIC PANEL, COMPREHENSIVE  -     COLLECTION VENOUS BLOOD,VENIPUNCTURE    9. Hypertension, unspecified type  -     potassium chloride (K-DUR, KLOR-CON) 20 mEq tablet; Take 1 Tab by mouth daily. Indications: prevention of low potassium in the blood  -     metOLazone (ZAROXOLYN) 5 mg tablet; Take 1 Tab by mouth daily. -     VITAMIN B12 INJECTION  -     THER/PROPH/DIAG INJECTION, SUBCUT/IM  -     CBC W/O DIFF  -     LIPID PANEL  -     TSH 3RD GENERATION  -     METABOLIC PANEL, COMPREHENSIVE  -     COLLECTION VENOUS BLOOD,VENIPUNCTURE    10. Platelets decreased (HCC)  -     potassium chloride (K-DUR, KLOR-CON) 20 mEq tablet;  Take 1 Tab by mouth daily. Indications: prevention of low potassium in the blood  -     metOLazone (ZAROXOLYN) 5 mg tablet; Take 1 Tab by mouth daily. -     VITAMIN B12 INJECTION  -     THER/PROPH/DIAG INJECTION, SUBCUT/IM  -     CBC W/O DIFF  -     LIPID PANEL  -     TSH 3RD GENERATION  -     METABOLIC PANEL, COMPREHENSIVE  -     COLLECTION VENOUS BLOOD,VENIPUNCTURE    11. Localized edema  -     potassium chloride (K-DUR, KLOR-CON) 20 mEq tablet; Take 1 Tab by mouth daily. Indications: prevention of low potassium in the blood  -     metOLazone (ZAROXOLYN) 5 mg tablet; Take 1 Tab by mouth daily. -     VITAMIN B12 INJECTION  -     THER/PROPH/DIAG INJECTION, SUBCUT/IM  -     cyanocobalamin (VITAMIN B-12) 1,000 mcg/mL injection; 1 mL by IntraMUSCular route once for 1 dose. -     CBC W/O DIFF  -     LIPID PANEL  -     TSH 3RD GENERATION  -     METABOLIC PANEL, COMPREHENSIVE  -     COLLECTION VENOUS BLOOD,VENIPUNCTURE    12. B12 deficiency  -     potassium chloride (K-DUR, KLOR-CON) 20 mEq tablet; Take 1 Tab by mouth daily. Indications: prevention of low potassium in the blood  -     metOLazone (ZAROXOLYN) 5 mg tablet; Take 1 Tab by mouth daily. -     VITAMIN B12 INJECTION  -     THER/PROPH/DIAG INJECTION, SUBCUT/IM  -     cyanocobalamin (VITAMIN B-12) 1,000 mcg/mL injection; 1 mL by IntraMUSCular route once for 1 dose.   -     CBC W/O DIFF  -     LIPID PANEL  -     TSH 3RD GENERATION  -     METABOLIC PANEL, COMPREHENSIVE  -     COLLECTION VENOUS BLOOD,VENIPUNCTURE      At this time swelling has been improved significantly patient weight has decreased greatly with discontinue Lasix once daily patient advised to do leg elevation at all times or most of the times, support hoses b/l 24hrs per day, ambulation as possible, use of two pillows at nights while in bed    [facility] education and counseling provided:  Age appropriate evidence-based preventive care recommendations based on today's review and evaluation; including relevant cancer screening guidelines, and vaccination recommendations. An After Visit Summary was printed and given to the patient with information about these guidelines, and a personalized schedule for health maintenance items. When appropriate and with patient agreement, orders noted below were placed to complete missing health maintenance items. Health Maintenance Due   Topic Date Due    Shingrix Vaccine Age 49> (1 of 2) 02/09/1997    GLAUCOMA SCREENING Q2Y  02/09/2012    MEDICARE YEARLY EXAM  09/19/2019    COLON CANCER SCRN (BARIUM / CT COLO / FLX SIG) Q5Y  02/05/2020       Discussed the patient's BMI with him. The BMI follow up plan is as follows:     dietary management education, guidance, and counseling  encourage exercise  monitor weight  prescribed dietary intake    An After Visit Summary was printed and given to the patient.

## 2019-09-19 LAB
ALBUMIN SERPL-MCNC: 3.9 G/DL (ref 3.5–4.8)
ALBUMIN/GLOB SERPL: 1.6 {RATIO} (ref 1.2–2.2)
ALP SERPL-CCNC: 77 IU/L (ref 39–117)
ALT SERPL-CCNC: 23 IU/L (ref 0–44)
AST SERPL-CCNC: 31 IU/L (ref 0–40)
BILIRUB SERPL-MCNC: 0.3 MG/DL (ref 0–1.2)
BUN SERPL-MCNC: 10 MG/DL (ref 8–27)
BUN/CREAT SERPL: 9 (ref 10–24)
CALCIUM SERPL-MCNC: 9.2 MG/DL (ref 8.6–10.2)
CHLORIDE SERPL-SCNC: 95 MMOL/L (ref 96–106)
CHOLEST SERPL-MCNC: 156 MG/DL (ref 100–199)
CO2 SERPL-SCNC: 35 MMOL/L (ref 20–29)
CREAT SERPL-MCNC: 1.15 MG/DL (ref 0.76–1.27)
ERYTHROCYTE [DISTWIDTH] IN BLOOD BY AUTOMATED COUNT: 13.8 % (ref 12.3–15.4)
GLOBULIN SER CALC-MCNC: 2.5 G/DL (ref 1.5–4.5)
GLUCOSE SERPL-MCNC: 109 MG/DL (ref 65–99)
HCT VFR BLD AUTO: 37.7 % (ref 37.5–51)
HCV AB S/CO SERPL IA: <0.1 S/CO RATIO (ref 0–0.9)
HDLC SERPL-MCNC: 58 MG/DL
HGB BLD-MCNC: 13.4 G/DL (ref 13–17.7)
LDLC SERPL CALC-MCNC: 86 MG/DL (ref 0–99)
MCH RBC QN AUTO: 31.2 PG (ref 26.6–33)
MCHC RBC AUTO-ENTMCNC: 35.5 G/DL (ref 31.5–35.7)
MCV RBC AUTO: 88 FL (ref 79–97)
PLATELET # BLD AUTO: 144 X10E3/UL (ref 150–450)
POTASSIUM SERPL-SCNC: 2.9 MMOL/L (ref 3.5–5.2)
PROT SERPL-MCNC: 6.4 G/DL (ref 6–8.5)
RBC # BLD AUTO: 4.29 X10E6/UL (ref 4.14–5.8)
SODIUM SERPL-SCNC: 143 MMOL/L (ref 134–144)
TRIGL SERPL-MCNC: 58 MG/DL (ref 0–149)
TSH SERPL DL<=0.005 MIU/L-ACNC: 1.59 UIU/ML (ref 0.45–4.5)
VLDLC SERPL CALC-MCNC: 12 MG/DL (ref 5–40)
WBC # BLD AUTO: 3.7 X10E3/UL (ref 3.4–10.8)

## 2019-10-18 ENCOUNTER — CLINICAL SUPPORT (OUTPATIENT)
Dept: FAMILY MEDICINE CLINIC | Age: 74
End: 2019-10-18

## 2019-10-18 DIAGNOSIS — E53.8 VITAMIN B12 DEFICIENCY NEUROPATHY (HCC): Primary | ICD-10-CM

## 2019-10-18 DIAGNOSIS — G63 VITAMIN B12 DEFICIENCY NEUROPATHY (HCC): Primary | ICD-10-CM

## 2019-10-18 NOTE — PATIENT INSTRUCTIONS
Cyanocobalamin (Vitamin B-12) (By injection)   Cyanocobalamin (ecz-vw-pu-koe-BAL-a-min)  Treats vitamin B-12 deficiency. Brand Name(s): B-12 Compliance Injection Kit, B-12 Kit, Physicians EZ Use B-12 Compliance, Vitamin Deficiency Injectable System - B12   There may be other brand names for this medicine. When This Medicine Should Not Be Used: This medicine is not right for everyone. Do not use it if you had an allergic reaction to vitamin B-12. How to Use This Medicine:   Injectable  · Your doctor will prescribe your exact dose and tell you how often it should be given. This medicine is given as a shot into one of your muscles. · Missed dose: Call your doctor or pharmacist for instructions. Drugs and Foods to Avoid:   Ask your doctor or pharmacist before using any other medicine, including over-the-counter medicines, vitamins, and herbal products. · Do not use folic acid supplements unless your doctor says it is okay. Warnings While Using This Medicine:   · Tell your doctor if you are pregnant or breastfeeding, or if you have kidney disease. · Keep all medicine out of the reach of children. Never share your medicine with anyone. Possible Side Effects While Using This Medicine:   Call your doctor right away if you notice any of these side effects:  · Allergic reaction: Itching or hives, swelling in your face or hands, swelling or tingling in your mouth or throat, chest tightness, trouble breathing  If you notice other side effects that you think are caused by this medicine, tell your doctor. Call your doctor for medical advice about side effects. You may report side effects to FDA at 9-881-FDA-3929  © 2017 [facility] Information is for End User's use only and may not be sold, redistributed or otherwise used for commercial purposes. The above information is an  only. It is not intended as medical advice for individual conditions or treatments.  Talk to your doctor, nurse or pharmacist before following any medical regimen to see if it is safe and effective for you.

## 2019-10-29 RX ORDER — CYANOCOBALAMIN 1000 UG/ML
1000 INJECTION, SOLUTION INTRAMUSCULAR; SUBCUTANEOUS ONCE
Qty: 1 ML | Refills: 0
Start: 2019-10-29 | End: 2019-10-29

## 2019-11-20 ENCOUNTER — CLINICAL SUPPORT (OUTPATIENT)
Dept: FAMILY MEDICINE CLINIC | Age: 74
End: 2019-11-20

## 2019-11-20 DIAGNOSIS — E53.8 VITAMIN B12 DEFICIENCY NEUROPATHY (HCC): Primary | ICD-10-CM

## 2019-11-20 DIAGNOSIS — G63 VITAMIN B12 DEFICIENCY NEUROPATHY (HCC): Primary | ICD-10-CM

## 2019-11-20 NOTE — PATIENT INSTRUCTIONS
Cyanocobalamin (Vitamin B-12) (By injection)   Cyanocobalamin (vgc-mu-mv-koe-BAL-a-min)  Treats vitamin B-12 deficiency. Brand Name(s): B-12 Compliance Injection Kit, B-12 Kit, Physicians EZ Use B-12 Compliance, Vitamin Deficiency Injectable System - B12   There may be other brand names for this medicine. When This Medicine Should Not Be Used: This medicine is not right for everyone. Do not use it if you had an allergic reaction to vitamin B-12. How to Use This Medicine:   Injectable  · Your doctor will prescribe your exact dose and tell you how often it should be given. This medicine is given as a shot into one of your muscles. · Missed dose: Call your doctor or pharmacist for instructions. Drugs and Foods to Avoid:   Ask your doctor or pharmacist before using any other medicine, including over-the-counter medicines, vitamins, and herbal products. · Do not use folic acid supplements unless your doctor says it is okay. Warnings While Using This Medicine:   · Tell your doctor if you are pregnant or breastfeeding, or if you have kidney disease. · Keep all medicine out of the reach of children. Never share your medicine with anyone. Possible Side Effects While Using This Medicine:   Call your doctor right away if you notice any of these side effects:  · Allergic reaction: Itching or hives, swelling in your face or hands, swelling or tingling in your mouth or throat, chest tightness, trouble breathing  If you notice other side effects that you think are caused by this medicine, tell your doctor. Call your doctor for medical advice about side effects. You may report side effects to FDA at 1-626-FDA-5201  © 2017 [facility] Information is for End User's use only and may not be sold, redistributed or otherwise used for commercial purposes. The above information is an  only. It is not intended as medical advice for individual conditions or treatments.  Talk to your doctor, nurse or pharmacist before following any medical regimen to see if it is safe and effective for you.

## 2019-11-20 NOTE — PROGRESS NOTES
Chief Complaint   Patient presents with    Immunization/Injection     b12     After obtaining consent, and per orders of , b12 1000 mcg/ml  given to  Left deltoid IM . Patient instructed to remain in clinic for 15 minutes afterwards, and to report any adverse reaction to me immediately.  Patient did not have any adverse reactions during this office visit

## 2019-11-21 RX ORDER — CYANOCOBALAMIN 1000 UG/ML
1000 INJECTION, SOLUTION INTRAMUSCULAR; SUBCUTANEOUS ONCE
Qty: 1 ML | Refills: 0
Start: 2019-11-21 | End: 2019-11-21

## 2019-12-18 ENCOUNTER — HOSPITAL ENCOUNTER (OUTPATIENT)
Dept: LAB | Age: 74
Discharge: HOME | End: 2019-12-18
Payer: MEDICARE

## 2019-12-18 ENCOUNTER — OFFICE VISIT (OUTPATIENT)
Dept: FAMILY MEDICINE CLINIC | Age: 74
End: 2019-12-18

## 2019-12-18 VITALS
OXYGEN SATURATION: 99 % | HEIGHT: 72 IN | RESPIRATION RATE: 12 BRPM | DIASTOLIC BLOOD PRESSURE: 68 MMHG | WEIGHT: 172 LBS | HEART RATE: 75 BPM | BODY MASS INDEX: 23.3 KG/M2 | TEMPERATURE: 97.7 F | SYSTOLIC BLOOD PRESSURE: 107 MMHG

## 2019-12-18 DIAGNOSIS — I10 HYPERTENSION, UNSPECIFIED TYPE: ICD-10-CM

## 2019-12-18 DIAGNOSIS — G63 VITAMIN B12 DEFICIENCY NEUROPATHY (HCC): ICD-10-CM

## 2019-12-18 DIAGNOSIS — D69.6 PLATELETS DECREASED (HCC): ICD-10-CM

## 2019-12-18 DIAGNOSIS — E53.8 VITAMIN B12 DEFICIENCY NEUROPATHY (HCC): ICD-10-CM

## 2019-12-18 DIAGNOSIS — R60.0 LOCALIZED EDEMA: ICD-10-CM

## 2019-12-18 DIAGNOSIS — E53.8 B12 DEFICIENCY: ICD-10-CM

## 2019-12-18 PROCEDURE — 80053 COMPREHEN METABOLIC PANEL: CPT

## 2019-12-18 PROCEDURE — 36415 COLL VENOUS BLD VENIPUNCTURE: CPT

## 2019-12-18 RX ORDER — CYANOCOBALAMIN 1000 UG/ML
1000 INJECTION, SOLUTION INTRAMUSCULAR; SUBCUTANEOUS ONCE
Qty: 1 ML | Refills: 0
Start: 2019-12-18 | End: 2019-12-18

## 2019-12-18 RX ORDER — POTASSIUM CHLORIDE 20 MEQ/1
20 TABLET, EXTENDED RELEASE ORAL DAILY
Qty: 90 TAB | Refills: 1 | Status: SHIPPED | OUTPATIENT
Start: 2019-12-18 | End: 2020-03-23 | Stop reason: SDUPTHER

## 2019-12-18 NOTE — PROGRESS NOTES
Chief Complaint   Patient presents with    Hypertension     follow up     1. Have you been to the ER, urgent care clinic since your last visit? Hospitalized since your last visit? No    2. Have you seen or consulted any other health care providers outside of the [address] [place] [address] since your last visit? Include any pap smears or colon screening. No     After obtaining consent, and per orders of , b12 1000 mcg/ml  given to  Left deltoid im . Patient instructed to remain in clinic for 15 minutes afterwards, and to report any adverse reaction to me immediately.  Patient did not have any adverse reactions during this office visit

## 2019-12-18 NOTE — PROGRESS NOTES
HISTORY OF PRESENT ILLNESS  Hussein Cameron is a 69 y.o. male. HPI   Hx of recurrent Hypokalemia  Currently on klor con , he patient isnot having a potassium rich diet, small amount of muscle ache, patient does not drink a lot of water, does not vomit daily, notover driking, has not seen the GI,pt did not go to ER, pt has no polyuria, was checked for the k level in the past with the level on the low side,  No tremor, nl sleep, no depressed no s no h ideation,  b12 def   Currently  mostly B12 injection, previously patient was taking oral B12 and patient states that the oral medication was not helping, the injection has been providing the patient more stamina patient is also feeling less fatigued more active, no metformin no etoh intake, on ++ multivitamin daily    Sodium 143   134 - 144 mmol/L Final   Potassium 2.9  Low   3.5 - 5.2 mmol/L Final   Chloride 95  Low           172 lb (78 kg) 187 lb (84.8 kg) 216            Current Outpatient Medications   Medication Sig Dispense Refill    cyanocobalamin (VITAMIN B-12) 1,000 mcg/mL injection 1 mL by IntraMUSCular route once for 1 dose. 1 mL 0    potassium chloride (K-DUR, KLOR-CON) 20 mEq tablet Take 1 Tab by mouth daily. Indications: prevention of low potassium in the blood 90 Tab 1    varicella zoster vaccine live (VARICELLA-ZOSTER VACINE LIVE) 19,400 unit/0.65 mL susr injection 1 Vial by SubCUTAneous route once for 1 dose. 0.65 mL 0    aspirin delayed-release 81 mg tablet Take  by mouth daily.  metOLazone (ZAROXOLYN) 5 mg tablet Take 1 Tab by mouth daily.  90 Tab 1    electrolytes-dextrose (PEDIALYTE) soln solution One bottle of 237ml daily 1 Bottle 6    cholecalciferol (VITAMIN D3) 1,000 unit cap One tab daily 30 Cap 11     No Known Allergies  Past Medical History:   Diagnosis Date    Elevated PSA, less than 10 ng/ml 12/10/2012    Hypertension     Hypokalemia 7/25/2014    Localized edema 2/7/2017    Patient is full code 9/18/2018    Vitamin B12 deficiency neuropathy ([facility] [address] 6/18/2019    Vitamin D deficiency 7/8/2014    WBC decreased 7/6/2012     Past Surgical History:   Procedure Laterality Date    COLONOSCOPY,DIAGNOSTIC  2/5/2015         HX COLONOSCOPY      HX ORTHOPAEDIC      left knee surg in  1984     Family History   Problem Relation Age of Onset    Kidney Disease Father      Social History     Tobacco Use    Smoking status: Never Smoker    Smokeless tobacco: Never Used   Substance Use Topics    Alcohol use: No      Lab Results   Component Value Date/Time    Hemoglobin A1c 6.5 (H) 01/13/2016 09:03 AM    Hemoglobin A1c 6.3 (H) 07/13/2015 09:18 AM    Hemoglobin A1c 6.4 (H) 01/26/2015 10:21 AM    Glucose 109 (H) 09/18/2019 12:55 PM    LDL, calculated 86 09/18/2019 12:55 PM    Creatinine 1.15 09/18/2019 12:55 PM      Lab Results   Component Value Date/Time    Cholesterol, total 156 09/18/2019 12:55 PM    HDL Cholesterol 58 09/18/2019 12:55 PM    LDL, calculated 86 09/18/2019 12:55 PM    Triglyceride 58 09/18/2019 12:55 PM        Review of Systems   Constitutional: Negative for chills and fever. HENT: Negative for ear pain and nosebleeds. Eyes: Negative for blurred vision, pain and discharge. Respiratory: Negative for shortness of breath. Cardiovascular: Negative for chest pain and leg swelling. Gastrointestinal: Negative for constipation, diarrhea, nausea and vomiting. Genitourinary: Negative for frequency. Musculoskeletal: Negative for joint pain. Skin: Negative for itching and rash. Neurological: Negative for headaches. Psychiatric/Behavioral: Negative for depression. The patient is not nervous/anxious. Physical Exam  Vitals signs and nursing note reviewed. Constitutional:       Appearance: He is well-developed. HENT:      Head: Normocephalic and atraumatic. Mouth/Throat:      Pharynx: No oropharyngeal exudate.    Eyes:      Conjunctiva/sclera: Conjunctivae normal.      Pupils: Pupils are equal, round, and reactive to light.   Neck:      Musculoskeletal: Normal range of motion and neck supple. Thyroid: No thyromegaly. Vascular: No JVD. Cardiovascular:      Rate and Rhythm: Normal rate and regular rhythm. Heart sounds: Normal heart sounds. No murmur. No friction rub. Pulmonary:      Effort: Pulmonary effort is normal. No respiratory distress. Breath sounds: Normal breath sounds. No wheezing or rales. Abdominal:      General: Bowel sounds are normal. There is no distension. Palpations: Abdomen is soft. Tenderness: There is no tenderness. Musculoskeletal:         General: No tenderness. Lymphadenopathy:      Cervical: No cervical adenopathy. Skin:     General: Skin is warm. Findings: No erythema or rash. Neurological:      Mental Status: He is alert and oriented to person, place, and time. Deep Tendon Reflexes: Reflexes are normal and symmetric. Psychiatric:         Behavior: Behavior normal.         ASSESSMENT and PLAN  Diagnoses and all orders for this visit:    1. Localized edema  -     METABOLIC PANEL, COMPREHENSIVE  -     VITAMIN B12 INJECTION  -     THER/PROPH/DIAG INJECTION, SUBCUT/IM  -     cyanocobalamin (VITAMIN B-12) 1,000 mcg/mL injection; 1 mL by IntraMUSCular route once for 1 dose. -     potassium chloride (K-DUR, KLOR-CON) 20 mEq tablet; Take 1 Tab by mouth daily. Indications: prevention of low potassium in the blood    2. Hypertension, unspecified type  -     METABOLIC PANEL, COMPREHENSIVE  -     VITAMIN B12 INJECTION  -     THER/PROPH/DIAG INJECTION, SUBCUT/IM  -     cyanocobalamin (VITAMIN B-12) 1,000 mcg/mL injection; 1 mL by IntraMUSCular route once for 1 dose. -     potassium chloride (K-DUR, KLOR-CON) 20 mEq tablet; Take 1 Tab by mouth daily. Indications: prevention of low potassium in the blood    3.  Platelets decreased (HCC)  -     METABOLIC PANEL, COMPREHENSIVE  -     VITAMIN B12 INJECTION  -     THER/PROPH/DIAG INJECTION, SUBCUT/IM  - cyanocobalamin (VITAMIN B-12) 1,000 mcg/mL injection; 1 mL by IntraMUSCular route once for 1 dose. -     potassium chloride (K-DUR, KLOR-CON) 20 mEq tablet; Take 1 Tab by mouth daily. Indications: prevention of low potassium in the blood    4. B12 deficiency  -     METABOLIC PANEL, COMPREHENSIVE  -     VITAMIN B12 INJECTION  -     THER/PROPH/DIAG INJECTION, SUBCUT/IM  -     cyanocobalamin (VITAMIN B-12) 1,000 mcg/mL injection; 1 mL by IntraMUSCular route once for 1 dose. -     potassium chloride (K-DUR, KLOR-CON) 20 mEq tablet; Take 1 Tab by mouth daily. Indications: prevention of low potassium in the blood    5. Vitamin B12 deficiency neuropathy (HCC)  -     METABOLIC PANEL, COMPREHENSIVE  -     VITAMIN B12 INJECTION  -     THER/PROPH/DIAG INJECTION, SUBCUT/IM  -     cyanocobalamin (VITAMIN B-12) 1,000 mcg/mL injection; 1 mL by IntraMUSCular route once for 1 dose. -     potassium chloride (K-DUR, KLOR-CON) 20 mEq tablet; Take 1 Tab by mouth daily. Indications: prevention of low potassium in the blood    Other orders  -     varicella zoster vaccine live (VARICELLA-ZOSTER VACINE LIVE) 19,400 unit/0.65 mL susr injection; 1 Vial by SubCUTAneous route once for 1 dose.

## 2019-12-18 NOTE — PATIENT INSTRUCTIONS
Cyanocobalamin (Vitamin B-12) (By injection) Cyanocobalamin (zdv-bj-wb-koe-BAL-a-min) Treats vitamin B-12 deficiency. Brand Name(s): B-12 Compliance Injection Kit, B-12 Kit, [employer]icians EZ Use B-12 Compliance, Vitamin Deficiency Injectable System - B12 There may be other brand names for this medicine. When This Medicine Should Not Be Used: This medicine is not right for everyone. Do not use it if you had an allergic reaction to vitamin B-12. How to Use This Medicine:  
Injectable · Your doctor will prescribe your exact dose and tell you how often it should be given. This medicine is given as a shot into one of your muscles. · Missed dose: Call your doctor or pharmacist for instructions. Drugs and Foods to Avoid: Ask your doctor or pharmacist before using any other medicine, including over-the-counter medicines, vitamins, and herbal products. · Do not use folic acid supplements unless your doctor says it is okay. Warnings While Using This Medicine: · Tell your doctor if you are pregnant or breastfeeding, or if you have kidney disease. · Keep all medicine out of the reach of children. Never share your medicine with anyone. Possible Side Effects While Using This Medicine:  
Call your doctor right away if you notice any of these side effects: · Allergic reaction: Itching or hives, swelling in your face or hands, swelling or tingling in your mouth or throat, chest tightness, trouble breathing If you notice other side effects that you think are caused by this medicine, tell your doctor. Call your doctor for medical advice about side effects. You may report side effects to FDA at 7-817-FDA-6754 © 2017 [address] Information is for End User's use only and may not be sold, redistributed or otherwise used for commercial purposes. The above information is an  only. It is not intended as medical advice for individual conditions or treatments.  Talk to your doctor, nurse or pharmacist before following any medical regimen to see if it is safe and effective for you.

## 2019-12-19 LAB
ALBUMIN SERPL-MCNC: 4 G/DL (ref 3.5–4.8)
ALBUMIN/GLOB SERPL: 1.5 {RATIO} (ref 1.2–2.2)
ALP SERPL-CCNC: 81 IU/L (ref 39–117)
ALT SERPL-CCNC: 14 IU/L (ref 0–44)
AST SERPL-CCNC: 24 IU/L (ref 0–40)
BILIRUB SERPL-MCNC: 0.2 MG/DL (ref 0–1.2)
BUN SERPL-MCNC: 14 MG/DL (ref 8–27)
BUN/CREAT SERPL: 13 (ref 10–24)
CALCIUM SERPL-MCNC: 9.8 MG/DL (ref 8.6–10.2)
CHLORIDE SERPL-SCNC: 96 MMOL/L (ref 96–106)
CO2 SERPL-SCNC: 33 MMOL/L (ref 20–29)
CREAT SERPL-MCNC: 1.11 MG/DL (ref 0.76–1.27)
GLOBULIN SER CALC-MCNC: 2.7 G/DL (ref 1.5–4.5)
GLUCOSE SERPL-MCNC: 114 MG/DL (ref 65–99)
POTASSIUM SERPL-SCNC: 3.3 MMOL/L (ref 3.5–5.2)
PROT SERPL-MCNC: 6.7 G/DL (ref 6–8.5)
SODIUM SERPL-SCNC: 143 MMOL/L (ref 134–144)

## 2020-01-15 ENCOUNTER — CLINICAL SUPPORT (OUTPATIENT)
Dept: FAMILY MEDICINE CLINIC | Age: 75
End: 2020-01-15

## 2020-01-15 VITALS
DIASTOLIC BLOOD PRESSURE: 78 MMHG | SYSTOLIC BLOOD PRESSURE: 121 MMHG | OXYGEN SATURATION: 98 % | RESPIRATION RATE: 16 BRPM | TEMPERATURE: 97.9 F | HEART RATE: 69 BPM

## 2020-01-15 DIAGNOSIS — G63 VITAMIN B12 DEFICIENCY NEUROPATHY (HCC): Primary | ICD-10-CM

## 2020-01-15 DIAGNOSIS — E53.8 VITAMIN B12 DEFICIENCY NEUROPATHY (HCC): Primary | ICD-10-CM

## 2020-01-15 NOTE — PATIENT INSTRUCTIONS
Cyanocobalamin (Vitamin B-12) (By injection)   Cyanocobalamin (bam-rz-hy-koe-BAL-a-min)  Treats vitamin B-12 deficiency. Brand Name(s): B-12 Compliance Injection Kit, B-12 Kit, Physicians EZ Use B-12 Compliance, Vitamin Deficiency Injectable System - B12   There may be other brand names for this medicine. When This Medicine Should Not Be Used: This medicine is not right for everyone. Do not use it if you had an allergic reaction to vitamin B-12. How to Use This Medicine:   Injectable  · Your doctor will prescribe your exact dose and tell you how often it should be given. This medicine is given as a shot into one of your muscles. · Missed dose: Call your doctor or pharmacist for instructions. Drugs and Foods to Avoid:   Ask your doctor or pharmacist before using any other medicine, including over-the-counter medicines, vitamins, and herbal products. · Do not use folic acid supplements unless your doctor says it is okay. Warnings While Using This Medicine:   · Tell your doctor if you are pregnant or breastfeeding, or if you have kidney disease. · Keep all medicine out of the reach of children. Never share your medicine with anyone. Possible Side Effects While Using This Medicine:   Call your doctor right away if you notice any of these side effects:  · Allergic reaction: Itching or hives, swelling in your face or hands, swelling or tingling in your mouth or throat, chest tightness, trouble breathing  If you notice other side effects that you think are caused by this medicine, tell your doctor. Call your doctor for medical advice about side effects. You may report side effects to FDA at 7-988-FDA-9080  © 2017 [address] Information is for End User's use only and may not be sold, redistributed or otherwise used for commercial purposes. The above information is an  only. It is not intended as medical advice for individual conditions or treatments.  Talk to your doctor, nurse or pharmacist before following any medical regimen to see if it is safe and effective for you.

## 2020-03-18 ENCOUNTER — TELEPHONE (OUTPATIENT)
Dept: FAMILY MEDICINE CLINIC | Age: 75
End: 2020-03-18

## 2020-03-18 NOTE — TELEPHONE ENCOUNTER
Pt came into office with c/o congestion x 1.5 weeks. Taking otc meds with no relief. Sputum thick yellow, denies fever/chills.   Please call  [phone]

## 2020-03-23 DIAGNOSIS — R60.0 LOCALIZED EDEMA: ICD-10-CM

## 2020-03-23 DIAGNOSIS — E53.8 VITAMIN B12 DEFICIENCY NEUROPATHY (HCC): ICD-10-CM

## 2020-03-23 DIAGNOSIS — D69.6 PLATELETS DECREASED (HCC): ICD-10-CM

## 2020-03-23 DIAGNOSIS — E53.8 B12 DEFICIENCY: ICD-10-CM

## 2020-03-23 DIAGNOSIS — I10 HYPERTENSION, UNSPECIFIED TYPE: ICD-10-CM

## 2020-03-23 DIAGNOSIS — E55.9 VITAMIN D DEFICIENCY: ICD-10-CM

## 2020-03-23 DIAGNOSIS — G63 VITAMIN B12 DEFICIENCY NEUROPATHY (HCC): ICD-10-CM

## 2020-03-24 RX ORDER — POTASSIUM CHLORIDE 20 MEQ/1
20 TABLET, EXTENDED RELEASE ORAL DAILY
Qty: 90 TAB | Refills: 1 | Status: SHIPPED | OUTPATIENT
Start: 2020-03-24 | End: 2021-01-15 | Stop reason: SDUPTHER

## 2020-03-24 RX ORDER — METOLAZONE 5 MG/1
5 TABLET ORAL DAILY
Qty: 90 TAB | Refills: 1 | Status: SHIPPED | OUTPATIENT
Start: 2020-03-24 | End: 2021-01-22 | Stop reason: SDUPTHER

## 2020-03-24 RX ORDER — GLUCOSAMINE SULFATE 1500 MG
POWDER IN PACKET (EA) ORAL
Qty: 30 CAP | Refills: 11 | Status: SHIPPED | OUTPATIENT
Start: 2020-03-24 | End: 2021-01-15 | Stop reason: SDUPTHER

## 2021-01-15 DIAGNOSIS — E55.9 VITAMIN D DEFICIENCY: ICD-10-CM

## 2021-01-15 DIAGNOSIS — D69.6 PLATELETS DECREASED (HCC): ICD-10-CM

## 2021-01-15 DIAGNOSIS — R60.0 LOCALIZED EDEMA: ICD-10-CM

## 2021-01-15 DIAGNOSIS — E53.8 VITAMIN B12 DEFICIENCY NEUROPATHY (HCC): ICD-10-CM

## 2021-01-15 DIAGNOSIS — E53.8 B12 DEFICIENCY: ICD-10-CM

## 2021-01-15 DIAGNOSIS — I10 HYPERTENSION, UNSPECIFIED TYPE: ICD-10-CM

## 2021-01-15 DIAGNOSIS — G63 VITAMIN B12 DEFICIENCY NEUROPATHY (HCC): ICD-10-CM

## 2021-01-15 RX ORDER — POTASSIUM CHLORIDE 20 MEQ/1
20 TABLET, EXTENDED RELEASE ORAL DAILY
Qty: 90 TAB | Refills: 1 | Status: SHIPPED | OUTPATIENT
Start: 2021-01-15 | End: 2021-02-05 | Stop reason: SDUPTHER

## 2021-01-15 RX ORDER — GLUCOSAMINE SULFATE 1500 MG
POWDER IN PACKET (EA) ORAL
Qty: 30 CAP | Refills: 11 | Status: SHIPPED | OUTPATIENT
Start: 2021-01-15 | End: 2021-02-05 | Stop reason: SDUPTHER

## 2021-01-15 NOTE — TELEPHONE ENCOUNTER
Pt would like a refill for     Vit D3  Potassium chloride     [facility]     [phone]

## 2021-01-22 ENCOUNTER — OFFICE VISIT (OUTPATIENT)
Dept: FAMILY MEDICINE CLINIC | Age: 76
End: 2021-01-22
Payer: MEDICARE

## 2021-01-22 VITALS
TEMPERATURE: 98 F | HEIGHT: 72 IN | RESPIRATION RATE: 16 BRPM | HEART RATE: 97 BPM | OXYGEN SATURATION: 98 % | WEIGHT: 195.7 LBS | BODY MASS INDEX: 26.51 KG/M2 | DIASTOLIC BLOOD PRESSURE: 78 MMHG | SYSTOLIC BLOOD PRESSURE: 124 MMHG

## 2021-01-22 DIAGNOSIS — I10 HYPERTENSION, UNSPECIFIED TYPE: ICD-10-CM

## 2021-01-22 DIAGNOSIS — Z71.89 ADVANCED DIRECTIVES, COUNSELING/DISCUSSION: ICD-10-CM

## 2021-01-22 DIAGNOSIS — D69.6 PLATELETS DECREASED (HCC): ICD-10-CM

## 2021-01-22 DIAGNOSIS — Z13.39 SCREENING FOR ALCOHOLISM: ICD-10-CM

## 2021-01-22 DIAGNOSIS — Z23 ENCOUNTER FOR IMMUNIZATION: ICD-10-CM

## 2021-01-22 DIAGNOSIS — E53.8 VITAMIN B12 DEFICIENCY NEUROPATHY (HCC): ICD-10-CM

## 2021-01-22 DIAGNOSIS — E53.8 B12 DEFICIENCY: ICD-10-CM

## 2021-01-22 DIAGNOSIS — G63 VITAMIN B12 DEFICIENCY NEUROPATHY (HCC): ICD-10-CM

## 2021-01-22 DIAGNOSIS — R60.0 LOCALIZED EDEMA: ICD-10-CM

## 2021-01-22 DIAGNOSIS — Z00.00 MEDICARE ANNUAL WELLNESS VISIT, SUBSEQUENT: Primary | ICD-10-CM

## 2021-01-22 PROCEDURE — G8510 SCR DEP NEG, NO PLAN REQD: HCPCS | Performed by: FAMILY MEDICINE

## 2021-01-22 PROCEDURE — G8427 DOCREV CUR MEDS BY ELIG CLIN: HCPCS | Performed by: FAMILY MEDICINE

## 2021-01-22 PROCEDURE — G8752 SYS BP LESS 140: HCPCS | Performed by: FAMILY MEDICINE

## 2021-01-22 PROCEDURE — 3017F COLORECTAL CA SCREEN DOC REV: CPT | Performed by: FAMILY MEDICINE

## 2021-01-22 PROCEDURE — 1101F PT FALLS ASSESS-DOCD LE1/YR: CPT | Performed by: FAMILY MEDICINE

## 2021-01-22 PROCEDURE — G8536 NO DOC ELDER MAL SCRN: HCPCS | Performed by: FAMILY MEDICINE

## 2021-01-22 PROCEDURE — G0442 ANNUAL ALCOHOL SCREEN 15 MIN: HCPCS | Performed by: FAMILY MEDICINE

## 2021-01-22 PROCEDURE — G8754 DIAS BP LESS 90: HCPCS | Performed by: FAMILY MEDICINE

## 2021-01-22 PROCEDURE — G8419 CALC BMI OUT NRM PARAM NOF/U: HCPCS | Performed by: FAMILY MEDICINE

## 2021-01-22 PROCEDURE — G0439 PPPS, SUBSEQ VISIT: HCPCS | Performed by: FAMILY MEDICINE

## 2021-01-22 PROCEDURE — 90662 IIV NO PRSV INCREASED AG IM: CPT | Performed by: FAMILY MEDICINE

## 2021-01-22 RX ORDER — METOLAZONE 5 MG/1
5 TABLET ORAL DAILY
Qty: 90 TAB | Refills: 1 | Status: SHIPPED | OUTPATIENT
Start: 2021-01-22 | End: 2021-02-05 | Stop reason: SDUPTHER

## 2021-01-22 RX ORDER — FLUTICASONE PROPIONATE 50 MCG
1 SPRAY, SUSPENSION (ML) NASAL DAILY
Qty: 1 BOTTLE | Refills: 5 | Status: SHIPPED | OUTPATIENT
Start: 2021-01-22

## 2021-01-22 NOTE — PATIENT INSTRUCTIONS
Medicare Wellness Visit, Male    The best way to live healthy is to have a lifestyle where you eat a well-balanced diet, exercise regularly, limit alcohol use, and quit all forms of tobacco/nicotine, if applicable. Regular preventive services are another way to keep healthy. Preventive services (vaccines, screening tests, monitoring & exams) can help personalize your care plan, which helps you manage your own care. Screening tests can find health problems at the earliest stages, when they are easiest to treat. [facility] follows the current, evidence-based guidelines published by the [facility] ([facility]) when recommending preventive services for our patients. Because we follow these guidelines, sometimes recommendations change over time as research supports it. (For example, a prostate screening blood test is no longer routinely recommended for men with no symptoms). Of course, you and your doctor may decide to screen more often for some diseases, based on your risk and co-morbidities (chronic disease you are already diagnosed with). Preventive services for you include:  - Medicare offers their members a free annual wellness visit, which is time for you and your primary care provider to discuss and plan for your preventive service needs. Take advantage of this benefit every year!  -All adults over age 72 should receive the recommended pneumonia vaccines. Current USPSTF guidelines recommend a series of two vaccines for the best pneumonia protection.   -All adults should have a flu vaccine yearly and tetanus vaccine every 10 years.  -All adults age 48 and older should receive the shingles vaccines (series of two vaccines).        -All adults age 38-68 who are overweight should have a diabetes screening test once every three years.   -Other screening tests & preventive services for persons with diabetes include: an eye exam to screen for diabetic retinopathy, a kidney function test, a foot exam, and stricter control over your cholesterol.   -Cardiovascular screening for adults with routine risk involves an electrocardiogram (ECG) at intervals determined by the provider.   -Colorectal cancer screening should be done for adults age 54-65 with no increased risk factors for colorectal cancer. There are a number of acceptable methods of screening for this type of cancer. Each test has its own benefits and drawbacks. Discuss with your provider what is most appropriate for you during your annual wellness visit. The different tests include: colonoscopy (considered the best screening method), a fecal occult blood test, a fecal DNA test, and sigmoidoscopy.  -All adults born between [facility] should be screened once for Hepatitis C.  -An Abdominal Aortic Aneurysm (AAA) Screening is recommended for men age 73-68 who has ever smoked in their lifetime.      Here is a list of your current Health Maintenance items (your personalized list of preventive services) with a due date:  Health Maintenance Due   Topic Date Due    COVID-19 Vaccine (1 of 2) 02/09/1961    Shingles Vaccine (1 of 2) 02/09/1997    Glaucoma Screening   02/09/2012    Hemoglobin A1C    01/13/2017    Colorectal Screening  02/05/2020

## 2021-01-22 NOTE — ACP (ADVANCE CARE PLANNING)
Advance Care Planning (ACP) Physician       Date of ACP Conversation: 4/24/2020    Conversation Conducted with:   Patient with Decision Making Capacity     Other Legally Authorized Decision Maker would be family member eleazar brothminor as SDM     For My patients who has currently great Decision Making Capacity:     Patient is on presence of no family member,, stated that the pt wants to be not DNR at this time,  The pt likes to be a full code individual,  The patient states that there is a lot of will to live,  Pt was given the form,   will sign and bring us a copy on the later date.       Conversation Outcomes / Follow-Up Plan:   Completed new Advance Directive      Length of ACP Conversation in minutes:  16 minutes

## 2021-01-22 NOTE — PROGRESS NOTES
Chief Complaint   Patient presents with   [facility] Annual Wellness Visit     1. Have you been to the ER, urgent care clinic since your last visit? Hospitalized since your last visit? No    2. Have you seen or consulted any other health care providers outside of the [address] [place] [address] since your last visit? Include any pap smears or colon screening. No    Verified patient with two type of identifiers. C/o occasional  Runny nose     After obtaining consent, and per orders of , flu vaccine  given to  Left deltoid IM  . Patient instructed to remain in clinic for 15 minutes afterwards, and to report any adverse reaction to me immediately.  Patient did not have any adverse reactions during this office visit

## 2021-01-25 ENCOUNTER — HOSPITAL ENCOUNTER (OUTPATIENT)
Dept: LAB | Age: 76
Discharge: HOME | End: 2021-01-25
Payer: MEDICARE

## 2021-01-25 PROCEDURE — 85027 COMPLETE CBC AUTOMATED: CPT

## 2021-01-25 PROCEDURE — 81003 URINALYSIS AUTO W/O SCOPE: CPT

## 2021-01-25 PROCEDURE — 80053 COMPREHEN METABOLIC PANEL: CPT

## 2021-01-25 PROCEDURE — 82607 VITAMIN B-12: CPT

## 2021-01-25 PROCEDURE — 80061 LIPID PANEL: CPT

## 2021-01-25 PROCEDURE — 36415 COLL VENOUS BLD VENIPUNCTURE: CPT

## 2021-01-25 PROCEDURE — 84443 ASSAY THYROID STIM HORMONE: CPT

## 2021-02-05 ENCOUNTER — OFFICE VISIT (OUTPATIENT)
Dept: FAMILY MEDICINE CLINIC | Age: 76
End: 2021-02-05
Payer: MEDICARE

## 2021-02-05 VITALS
SYSTOLIC BLOOD PRESSURE: 134 MMHG | TEMPERATURE: 98.2 F | RESPIRATION RATE: 18 BRPM | OXYGEN SATURATION: 98 % | HEIGHT: 72 IN | BODY MASS INDEX: 26.15 KG/M2 | DIASTOLIC BLOOD PRESSURE: 80 MMHG | WEIGHT: 193.1 LBS | HEART RATE: 90 BPM

## 2021-02-05 DIAGNOSIS — R60.0 LOCALIZED EDEMA: ICD-10-CM

## 2021-02-05 DIAGNOSIS — D69.6 PLATELETS DECREASED (HCC): ICD-10-CM

## 2021-02-05 DIAGNOSIS — E78.00 HYPERCHOLESTEREMIA: ICD-10-CM

## 2021-02-05 DIAGNOSIS — E53.8 VITAMIN B12 DEFICIENCY NEUROPATHY (HCC): ICD-10-CM

## 2021-02-05 DIAGNOSIS — I10 HYPERTENSION, UNSPECIFIED TYPE: Primary | ICD-10-CM

## 2021-02-05 DIAGNOSIS — E55.9 VITAMIN D DEFICIENCY: ICD-10-CM

## 2021-02-05 DIAGNOSIS — E53.8 B12 DEFICIENCY: ICD-10-CM

## 2021-02-05 DIAGNOSIS — G63 VITAMIN B12 DEFICIENCY NEUROPATHY (HCC): ICD-10-CM

## 2021-02-05 PROCEDURE — 1101F PT FALLS ASSESS-DOCD LE1/YR: CPT | Performed by: FAMILY MEDICINE

## 2021-02-05 PROCEDURE — 99214 OFFICE O/P EST MOD 30 MIN: CPT | Performed by: FAMILY MEDICINE

## 2021-02-05 PROCEDURE — G8754 DIAS BP LESS 90: HCPCS | Performed by: FAMILY MEDICINE

## 2021-02-05 PROCEDURE — 3017F COLORECTAL CA SCREEN DOC REV: CPT | Performed by: FAMILY MEDICINE

## 2021-02-05 PROCEDURE — G0463 HOSPITAL OUTPT CLINIC VISIT: HCPCS | Performed by: FAMILY MEDICINE

## 2021-02-05 PROCEDURE — G8752 SYS BP LESS 140: HCPCS | Performed by: FAMILY MEDICINE

## 2021-02-05 PROCEDURE — G8510 SCR DEP NEG, NO PLAN REQD: HCPCS | Performed by: FAMILY MEDICINE

## 2021-02-05 PROCEDURE — G8536 NO DOC ELDER MAL SCRN: HCPCS | Performed by: FAMILY MEDICINE

## 2021-02-05 PROCEDURE — G8427 DOCREV CUR MEDS BY ELIG CLIN: HCPCS | Performed by: FAMILY MEDICINE

## 2021-02-05 PROCEDURE — G8419 CALC BMI OUT NRM PARAM NOF/U: HCPCS | Performed by: FAMILY MEDICINE

## 2021-02-05 RX ORDER — ASPIRIN 81 MG/1
81 TABLET ORAL DAILY
Qty: 30 TAB | Refills: 11
Start: 2021-02-05 | End: 2021-03-05 | Stop reason: SDUPTHER

## 2021-02-05 RX ORDER — GLUCOSAMINE SULFATE 1500 MG
POWDER IN PACKET (EA) ORAL
Qty: 30 CAP | Refills: 11
Start: 2021-02-05 | End: 2021-04-05 | Stop reason: SDUPTHER

## 2021-02-05 RX ORDER — METOLAZONE 5 MG/1
5 TABLET ORAL DAILY
Qty: 90 TAB | Refills: 1 | Status: SHIPPED | OUTPATIENT
Start: 2021-02-05 | End: 2021-06-07 | Stop reason: ALTCHOICE

## 2021-02-05 RX ORDER — ROSUVASTATIN CALCIUM 5 MG/1
5 TABLET, COATED ORAL
Qty: 30 TAB | Refills: 6 | Status: SHIPPED | OUTPATIENT
Start: 2021-02-05

## 2021-02-05 RX ORDER — POTASSIUM CHLORIDE 20 MEQ/1
20 TABLET, EXTENDED RELEASE ORAL DAILY
Qty: 90 TAB | Refills: 1
Start: 2021-02-05 | End: 2021-04-05 | Stop reason: SDUPTHER

## 2021-02-05 NOTE — PROGRESS NOTES
HISTORY OF PRESENT ILLNESS  Lazara Jerez is a 70 y.o. male. Patient complains of edema. The location of the edema is lower leg(s) bilateral, ankle(s) bilateral, feet bilateral up to mid leg, last visit pt was prescribed zaroxolyn but not givne to him by the pharm or pt didn't  the rx,     Today patient complains of paresthesia, patient has the sensation of the pins and needles, in addition,  the patient also states that the ext/limb falls asleep and sometimes patient has the sensation of bugs crawling underneath the skin patient denies any herpetic lesion in the past, and has not done any nerve study, gabapentin has been helping the patient greatly with the pain and tingling sensation unfortunately recently patient ran out of the medication and since then the pain and tingling sensation is worsening without the current medication is 7 out of 10 radiating down to bilateral toes and hands and the condition not getting better without the medication      The edema has been moderate. Onset of symptoms was a few weeks ago, gradually worsening since that time. The edema is present all day. The patient states the problem is long-standing.    on the pt last visits, the patient did not have much of the legs swelling, the weight was also better, Today the patient denies leg pain, denies rash, and legs lesion,and the denial of dizziness,   the wt went down     193 lb 1.6 oz (87.6 kg)  as of 2/5/2021 193 lb 1.6 oz (87.6 kg) 195 lb 11.2 oz      latelets back to nl state, on daily aspirin,     In addition patient has history of hypercholesterolemia has been on any of the statin therapy denies any muscle ache last lipid panel was reviewed and was normal , hasnot  been trying to have a low-fat low-cholesterol diet sometimes likes the fast foods weight has been stable          Current Outpatient Medications   Medication Sig Dispense Refill    cholecalciferol (Vitamin D3) 25 mcg (1,000 unit) cap One tab daily 30 Cap 11    potassium chloride (K-DUR, KLOR-CON) 20 mEq tablet Take 1 Tab by mouth daily. Indications: prevention of low potassium in the blood 90 Tab 1    metOLazone (ZAROXOLYN) 5 mg tablet Take 1 Tab by mouth daily. Indications: accumulation of fluid resulting from chronic heart failure 90 Tab 1    aspirin delayed-release 81 mg tablet Take 1 Tab by mouth daily. 30 Tab 11    rosuvastatin (CRESTOR) 5 mg tablet Take 1 Tab by mouth nightly. 30 Tab 6    fluticasone propionate (FLONASE) 50 mcg/actuation nasal spray 1 [place] by Both Nostrils route daily. Indications: inflammation of the nose due to an allergy 1 Bottle 5    aspirin delayed-release 81 mg tablet Take  by mouth daily.        No Known Allergies  Past Medical History:   Diagnosis Date    Elevated PSA, less than 10 ng/ml 12/10/2012    Hypertension     Hypokalemia 7/25/2014    Localized edema 2/7/2017    Patient is full code 9/18/2018    Vitamin B12 deficiency neuropathy ([facility] [address] 6/18/2019    Vitamin D deficiency 7/8/2014    WBC decreased 7/6/2012     Past Surgical History:   Procedure Laterality Date    COLONOSCOPY,DIAGNOSTIC  2/5/2015         HX COLONOSCOPY      HX ORTHOPAEDIC      left knee surg in  1984     Family History   Problem Relation Age of Onset    Kidney Disease Father      Social History     Tobacco Use    Smoking status: Never Smoker    Smokeless tobacco: Never Used   Substance Use Topics    Alcohol use: No      Lab Results   Component Value Date/Time    WBC 3.8 01/25/2021 11:15 AM    HGB 13.6 01/25/2021 11:15 AM    HCT 40.2 01/25/2021 11:15 AM    PLATELET 019 33/87/2983 11:15 AM    MCV 85 01/25/2021 11:15 AM     Lab Results   Component Value Date/Time    GFR est non-AA 86 01/25/2021 11:15 AM    GFR est AA 99 01/25/2021 11:15 AM    Creatinine 0.87 01/25/2021 11:15 AM    BUN 11 01/25/2021 11:15 AM    Sodium 140 01/25/2021 11:15 AM    Potassium 4.1 01/25/2021 11:15 AM    Chloride 102 01/25/2021 11:15 AM    CO2 28 01/25/2021 11:15 AM    Magnesium 2.1 01/13/2016 09:03 AM        Review of Systems   Constitutional: Negative for chills and fever. HENT: Negative for congestion and nosebleeds. Eyes: Negative for blurred vision and pain. Respiratory: Negative for cough, shortness of breath and wheezing. Cardiovascular: Positive for leg swelling. Negative for chest pain. Gastrointestinal: Negative for constipation, diarrhea, nausea and vomiting. Genitourinary: Negative for dysuria and frequency. Musculoskeletal: Negative for joint pain and myalgias. Skin: Negative for itching and rash. Neurological: Negative for dizziness, loss of consciousness and headaches. Psychiatric/Behavioral: Negative for depression. The patient is not nervous/anxious and does not have insomnia. Physical Exam  Vitals signs and nursing note reviewed. Constitutional:       Appearance: He is well-developed. HENT:      Head: Normocephalic and atraumatic. Mouth/Throat:      Pharynx: No oropharyngeal exudate. Eyes:      Conjunctiva/sclera: Conjunctivae normal.      Pupils: Pupils are equal, round, and reactive to light. Neck:      Musculoskeletal: Normal range of motion and neck supple. Thyroid: No thyromegaly. Vascular: No JVD. Cardiovascular:      Rate and Rhythm: Normal rate and regular rhythm. Heart sounds: Normal heart sounds. No murmur. No friction rub. Pulmonary:      Effort: Pulmonary effort is normal. No respiratory distress. Breath sounds: Normal breath sounds. No wheezing or rales. Abdominal:      General: Bowel sounds are normal. There is no distension. Palpations: Abdomen is soft. Tenderness: There is no abdominal tenderness. Musculoskeletal:         General: No tenderness. Right lower leg: Edema present. Left lower leg: Edema present. Lymphadenopathy:      Cervical: No cervical adenopathy. Skin:     General: Skin is warm. Findings: No erythema or rash.    Neurological:      Mental Status: He is alert and oriented to person, place, and time. Deep Tendon Reflexes: Reflexes are normal and symmetric. Psychiatric:         Behavior: Behavior normal.         ASSESSMENT and PLAN  Diagnoses and all orders for this visit:    1. Hypertension, unspecified type  -     potassium chloride (K-DUR, KLOR-CON) 20 mEq tablet; Take 1 Tab by mouth daily. Indications: prevention of low potassium in the blood  -     metOLazone (ZAROXOLYN) 5 mg tablet; Take 1 Tab by mouth daily. Indications: accumulation of fluid resulting from chronic heart failure    2. Vitamin D deficiency  -     cholecalciferol (Vitamin D3) 25 mcg (1,000 unit) cap; One tab daily    3. Localized edema  -     potassium chloride (K-DUR, KLOR-CON) 20 mEq tablet; Take 1 Tab by mouth daily. Indications: prevention of low potassium in the blood  -     metOLazone (ZAROXOLYN) 5 mg tablet; Take 1 Tab by mouth daily. Indications: accumulation of fluid resulting from chronic heart failure    4. Platelets decreased (HCC)  -     potassium chloride (K-DUR, KLOR-CON) 20 mEq tablet; Take 1 Tab by mouth daily. Indications: prevention of low potassium in the blood  -     metOLazone (ZAROXOLYN) 5 mg tablet; Take 1 Tab by mouth daily. Indications: accumulation of fluid resulting from chronic heart failure    5. B12 deficiency  -     potassium chloride (K-DUR, KLOR-CON) 20 mEq tablet; Take 1 Tab by mouth daily. Indications: prevention of low potassium in the blood  -     metOLazone (ZAROXOLYN) 5 mg tablet; Take 1 Tab by mouth daily. Indications: accumulation of fluid resulting from chronic heart failure    6. Vitamin B12 deficiency neuropathy (HCC)  -     potassium chloride (K-DUR, KLOR-CON) 20 mEq tablet; Take 1 Tab by mouth daily. Indications: prevention of low potassium in the blood  -     metOLazone (ZAROXOLYN) 5 mg tablet; Take 1 Tab by mouth daily.  Indications: accumulation of fluid resulting from chronic heart failure    Other orders  -     aspirin delayed-release 81 mg tablet; Take 1 Tab by mouth daily. -     rosuvastatin (CRESTOR) 5 mg tablet; Take 1 Tab by mouth nightly. Follow-up and Dispositions    · Return in about 4 weeks (around 3/5/2021), or if symptoms worsen or fail to improve, for b12, swelling.

## 2021-03-05 ENCOUNTER — OFFICE VISIT (OUTPATIENT)
Dept: FAMILY MEDICINE CLINIC | Age: 76
End: 2021-03-05
Payer: MEDICARE

## 2021-03-05 VITALS
TEMPERATURE: 98.7 F | WEIGHT: 186.7 LBS | BODY MASS INDEX: 25.29 KG/M2 | RESPIRATION RATE: 16 BRPM | HEART RATE: 72 BPM | HEIGHT: 72 IN | DIASTOLIC BLOOD PRESSURE: 67 MMHG | SYSTOLIC BLOOD PRESSURE: 108 MMHG | OXYGEN SATURATION: 98 %

## 2021-03-05 DIAGNOSIS — G63 VITAMIN B12 DEFICIENCY NEUROPATHY (HCC): ICD-10-CM

## 2021-03-05 DIAGNOSIS — E53.8 B12 DEFICIENCY: Primary | ICD-10-CM

## 2021-03-05 DIAGNOSIS — I10 ESSENTIAL HYPERTENSION: ICD-10-CM

## 2021-03-05 DIAGNOSIS — E53.8 VITAMIN B12 DEFICIENCY NEUROPATHY (HCC): ICD-10-CM

## 2021-03-05 DIAGNOSIS — R60.0 LOCALIZED EDEMA: ICD-10-CM

## 2021-03-05 PROCEDURE — G8754 DIAS BP LESS 90: HCPCS | Performed by: FAMILY MEDICINE

## 2021-03-05 PROCEDURE — G0463 HOSPITAL OUTPT CLINIC VISIT: HCPCS | Performed by: FAMILY MEDICINE

## 2021-03-05 PROCEDURE — 1101F PT FALLS ASSESS-DOCD LE1/YR: CPT | Performed by: FAMILY MEDICINE

## 2021-03-05 PROCEDURE — 99213 OFFICE O/P EST LOW 20 MIN: CPT | Performed by: FAMILY MEDICINE

## 2021-03-05 PROCEDURE — G8536 NO DOC ELDER MAL SCRN: HCPCS | Performed by: FAMILY MEDICINE

## 2021-03-05 PROCEDURE — G8510 SCR DEP NEG, NO PLAN REQD: HCPCS | Performed by: FAMILY MEDICINE

## 2021-03-05 PROCEDURE — G8752 SYS BP LESS 140: HCPCS | Performed by: FAMILY MEDICINE

## 2021-03-05 PROCEDURE — 3017F COLORECTAL CA SCREEN DOC REV: CPT | Performed by: FAMILY MEDICINE

## 2021-03-05 PROCEDURE — G8419 CALC BMI OUT NRM PARAM NOF/U: HCPCS | Performed by: FAMILY MEDICINE

## 2021-03-05 PROCEDURE — G8427 DOCREV CUR MEDS BY ELIG CLIN: HCPCS | Performed by: FAMILY MEDICINE

## 2021-03-05 RX ORDER — CYANOCOBALAMIN 1000 UG/ML
1000 INJECTION, SOLUTION INTRAMUSCULAR; SUBCUTANEOUS ONCE
Qty: 1 ML | Refills: 0
Start: 2021-03-05 | End: 2021-03-05

## 2021-03-05 NOTE — PROGRESS NOTES
Chief Complaint   Patient presents with    Hypertension     1. Have you been to the ER, urgent care clinic since your last visit? Hospitalized since your last visit? No    2. Have you seen or consulted any other health care providers outside of the [address] [place] [address] since your last visit? Include any pap smears or colon screening. No     Verified patient with two type of identifiers. Requesting b12 injection    After obtaining consent, and per orders of , b12 1000 mcg/ml  given to  Left deltoid IM  . Patient instructed to remain in clinic for 15 minutes afterwards, and to report any adverse reaction to me immediately.  Patient did not have any adverse reactions during this office visit

## 2021-03-05 NOTE — PROGRESS NOTES
HISTORY OF PRESENT ILLNESS  Gabby Hernandez is a 78 y.o. male. present with current medical history current medications list social family and surgical history as follows with the following concern, Patient complains of edema. The location of the edema is lower leg(s) bilateral, ankle(s) bilateral, feet bilateral.  The edema has been moderate. Onset of symptoms was a few weeks ago, gradually worsening since that time. The edema is present all day. The patient states the problem is long-standing. on the pt last visits, the patient did not have much of the legs swelling, the weight was also better, Today the patient denies leg pain, denies rash, and legs lesion,and the denial of dizziness,   the wt went down by > 10 pounds,       186 lb 11.2 oz (84.7 kg)  as of 3/5/2021 186 lb 11.2 oz (84.7 kg) 193 lb 1.6 oz (87.6 kg) 195 lb      b12 def   Currently  mostly B12 injection,  the injection has been providing the patient more stamina patient is also feeling less fatigued more active,  No alcohol abuse      Current Outpatient Medications   Medication Sig Dispense Refill    cholecalciferol (Vitamin D3) 25 mcg (1,000 unit) cap One tab daily 30 Cap 11    potassium chloride (K-DUR, KLOR-CON) 20 mEq tablet Take 1 Tab by mouth daily. Indications: prevention of low potassium in the blood 90 Tab 1    metOLazone (ZAROXOLYN) 5 mg tablet Take 1 Tab by mouth daily. Indications: accumulation of fluid resulting from chronic heart failure 90 Tab 1    rosuvastatin (CRESTOR) 5 mg tablet Take 1 Tab by mouth nightly. 30 Tab 6    fluticasone propionate (FLONASE) 50 mcg/actuation nasal spray 1 [place] by Both Nostrils route daily. Indications: inflammation of the nose due to an allergy 1 Bottle 5    aspirin delayed-release 81 mg tablet Take  by mouth daily.        No Known Allergies  Past Medical History:   Diagnosis Date    Elevated PSA, less than 10 ng/ml 12/10/2012    Hypertension     Hypokalemia 7/25/2014    Localized edema 2/7/2017  Patient is full code 9/18/2018    Vitamin B12 deficiency neuropathy ([facility] [address] 6/18/2019    Vitamin D deficiency 7/8/2014    WBC decreased 7/6/2012     Past Surgical History:   Procedure Laterality Date    COLONOSCOPY,DIAGNOSTIC  2/5/2015         HX COLONOSCOPY      HX ORTHOPAEDIC      left knee surg in  1984     Family History   Problem Relation Age of Onset    Kidney Disease Father      Social History     Tobacco Use    Smoking status: Never Smoker    Smokeless tobacco: Never Used   Substance Use Topics    Alcohol use: No      Lab Results   Component Value Date/Time    WBC 3.8 01/25/2021 11:15 AM    HGB 13.6 01/25/2021 11:15 AM    HCT 40.2 01/25/2021 11:15 AM    PLATELET 423 51/47/1433 11:15 AM    MCV 85 01/25/2021 11:15 AM     Lab Results   Component Value Date/Time    GFR est non-AA 86 01/25/2021 11:15 AM    GFR est AA 99 01/25/2021 11:15 AM    Creatinine 0.87 01/25/2021 11:15 AM    BUN 11 01/25/2021 11:15 AM    Sodium 140 01/25/2021 11:15 AM    Potassium 4.1 01/25/2021 11:15 AM    Chloride 102 01/25/2021 11:15 AM    CO2 28 01/25/2021 11:15 AM    Magnesium 2.1 01/13/2016 09:03 AM        Review of Systems   Constitutional: Negative for chills, fever and malaise/fatigue. HENT: Negative for nosebleeds. Eyes: Negative for pain. Respiratory: Negative for cough and wheezing. Cardiovascular: Negative for chest pain. Gastrointestinal: Negative for constipation, diarrhea and nausea. Genitourinary: Negative for frequency. Musculoskeletal: Negative for joint pain and myalgias. Skin: Negative for rash. Neurological: Negative for loss of consciousness and headaches. Endo/Heme/Allergies: Does not bruise/bleed easily. Psychiatric/Behavioral: Negative for depression. The patient is not nervous/anxious and does not have insomnia. All other systems reviewed and are negative. Physical Exam  Vitals signs and nursing note reviewed. Constitutional:       Appearance: He is well-developed. HENT:      Head: Normocephalic and atraumatic. Mouth/Throat:      Pharynx: No oropharyngeal exudate. Eyes:      Conjunctiva/sclera: Conjunctivae normal.      Pupils: Pupils are equal, round, and reactive to light. Neck:      Musculoskeletal: Normal range of motion and neck supple. Thyroid: No thyromegaly. Vascular: No JVD. Cardiovascular:      Rate and Rhythm: Normal rate and regular rhythm. Heart sounds: Normal heart sounds. No murmur. No friction rub. Pulmonary:      Effort: Pulmonary effort is normal. No respiratory distress. Breath sounds: Normal breath sounds. No wheezing or rales. Abdominal:      General: Bowel sounds are normal. There is no distension. Palpations: Abdomen is soft. Tenderness: There is no abdominal tenderness. Musculoskeletal:         General: No tenderness. Right lower leg: Edema present. Left lower leg: Edema present. Lymphadenopathy:      Cervical: No cervical adenopathy. Skin:     General: Skin is warm. Findings: No erythema or rash. Neurological:      Mental Status: He is alert and oriented to person, place, and time. Deep Tendon Reflexes: Reflexes are normal and symmetric. Psychiatric:         Behavior: Behavior normal.         ASSESSMENT and PLAN  Diagnoses and all orders for this visit:    1. B12 deficiency  -     VITAMIN B12 INJECTION  -     THER/PROPH/DIAG INJECTION, SUBCUT/IM  -     cyanocobalamin (Vitamin B-12) 1,000 mcg/mL injection; 1 mL by IntraMUSCular route once for 1 dose. 2. Vitamin B12 deficiency neuropathy (HCC)  -     VITAMIN B12 INJECTION  -     THER/PROPH/DIAG INJECTION, SUBCUT/IM  -     cyanocobalamin (Vitamin B-12) 1,000 mcg/mL injection; 1 mL by IntraMUSCular route once for 1 dose.     3. Essential hypertension  -     VITAMIN B12 INJECTION  -     THER/PROPH/DIAG INJECTION, SUBCUT/IM  -     cyanocobalamin (Vitamin B-12) 1,000 mcg/mL injection; 1 mL by IntraMUSCular route once for 1 dose.    4. Localized edema  -     VITAMIN B12 INJECTION  -     THER/PROPH/DIAG INJECTION, SUBCUT/IM  -     cyanocobalamin (Vitamin B-12) 1,000 mcg/mL injection; 1 mL by IntraMUSCular route once for 1 dose. improving, the appropriate diet discussed. lifelong compliance to reduce risk is stressed. A regular exercise program,  maintain normal body weight, fitness,  to avoid fast food Advised, recheck for flp and lft in 1 months rtc fasting, meds side effect and compliance advised.

## 2021-03-05 NOTE — PATIENT INSTRUCTIONS
Electrolyte Supplement (By mouth) Treats and prevents dehydration. Replaces water, salts, and minerals lost through diarrhea or vomiting. Brand Name(s): CeraLyte 50, CeraLyte 70, CeraLyte 90, CeraORS 75, CeraSport, CeraSport EX1, CeraSport Electrolyte Drink Mix, CeraSport Electrolyte Hydration Drink Mix, Drip Drop Hydration Powder, Enfamil 5% Glucose in Water, Enfamil Enfalyte, [employer], [facility] Pediatric Electrolyte There may be other brand names for this medicine. When This Medicine Should Not Be Used: You should not use this medicine if you have had an allergic reaction to potassium citrate, sodium chloride (salt), or sodium citrate. Do not give this medicine to a child who has had an allergic reaction to potassium citrate, sodium chloride (salt), or sodium citrate. How to Use This Medicine:  
Liquid, Tablet, Packet · Your doctor will tell you how much medicine to use. Do not use more than directed. · Follow the instructions on the medicine label if you are using this medicine without a prescription. · Some liquid brands of this medicine should not be used more than 48 hours (2 days) after the medicine was opened. Follow the instructions on the medicine label, and throw out medicine that has been open for more than 48 hours. · The liquid medicine that comes in a plastic sleeve can be frozen and eaten as a popsicle. You can also pour the medicine from the sleeve into a cup or glass to drink. Throw out any of the medicine that you do not use right away. · Do not add additional water or juice to the liquid brands of this medicine. Adding more liquids can cause the medicine not to work as well. · Do not heat the liquid medicine. · To use the powder medicine, follow the directions on the packet. Mix the powder with the correct amount of water in a cup or glass, and stir until all the powder is dissolved. Refrigerate any unused medicine in a closed container. Throw out any unused medicine after 24 hours (1 day). How to Store and Dispose of This Medicine: · Store the medicine in a closed container at room temperature, away from heat, moisture, and direct light. · Dispose of outdated medicine or medicine no longer needed. Throw out any liquid medicine that has been open for longer than 48 hours (2 days). Throw out any of the powder medicine that has been open for longer than 24 hours (1 days). · After using the liquid medicine from the reclosable bottle, put the cap back on the bottle, and store any unused portion in the refrigerator. Drugs and Foods to Avoid: Ask your doctor or pharmacist before using any other medicine, including over-the-counter medicines, vitamins, and herbal products. Warnings While Using This Medicine: · Make sure your doctor knows if you are pregnant or breastfeeding, or if you or your child has kidney failure. · If your or your child's diarrhea or vomiting continues or gets worse, or if you or your child has sunken eyes, extreme thirst, or severe drowsiness, call your doctor or healthcare provider right away. Possible Side Effects While Using This Medicine:  
Call your doctor right away if you notice any of these side effects: · Allergic reaction: Itching or hives, swelling in your face or hands, swelling or tingling in your mouth or throat, chest tightness, trouble breathing · Diarrhea lasting more than 24 hours. If you notice other side effects that you think are caused by this medicine, tell your doctor. Call your doctor for medical advice about side effects. You may report side effects to FDA at 8-089-WTU-6425 © 2017 [facility] Information is for End User's use only and may not be sold, redistributed or otherwise used for commercial purposes. The above information is an  only. It is not intended as medical advice for individual conditions or treatments. Talk to your doctor, nurse or pharmacist before following any medical regimen to see if it is safe and effective for you. Low Sodium Diet (2,000 Milligram): Care Instructions Your Care Instructions Too much sodium causes your body to hold on to extra water. This can raise your blood pressure and force your heart and kidneys to work harder. In very serious cases, this could cause you to be put in the hospital. It might even be life-threatening. By limiting sodium, you will feel better and lower your risk of serious problems. The most common source of sodium is salt. People get most of the salt in their diet from canned, prepared, and packaged foods. Fast food and restaurant meals also are very high in sodium. Your doctor will probably limit your sodium to less than 2,000 milligrams (mg) a day. This limit counts all the sodium in prepared and packaged foods and any salt you add to your food. Follow-up care is a key part of your treatment and safety. Be sure to make and go to all appointments, and call your doctor if you are having problems. It's also a good idea to know your test results and keep a list of the medicines you take. How can you care for yourself at home? Read food labels · Read labels on cans and food packages. The labels tell you how much sodium is in each serving. Make sure that you look at the serving size. If you eat more than the serving size, you have eaten more sodium. · Food labels also tell you the Percent Daily Value for sodium. Choose products with low Percent Daily Values for sodium. · Be aware that sodium can come in forms other than salt, including monosodium glutamate (MSG), sodium citrate, and sodium bicarbonate (baking soda). MSG is often added to Asian food. When you eat out, you can sometimes ask for food without MSG or added salt. Buy low-sodium foods · Buy foods that are labeled \"unsalted\" (no salt added), \"sodium-free\" (less than 5 mg of sodium per serving), or \"low-sodium\" (less than 140 mg of sodium per serving). Foods labeled \"reduced-sodium\" and \"light sodium\" may still have too much sodium. Be sure to read the label to see how much sodium you are getting. · Buy fresh vegetables, or frozen vegetables without added sauces. Buy low-sodium versions of canned vegetables, soups, and other canned goods. Prepare low-sodium meals · Cut back on the amount of salt you use in cooking. This will help you adjust to the taste. Do not add salt after cooking. One teaspoon of salt has about 2,300 mg of sodium. · Take the salt shaker off the table. · Flavor your food with garlic, lemon juice, onion, vinegar, herbs, and spices. Do not use soy sauce, lite soy sauce, steak sauce, onion salt, garlic salt, celery salt, mustard, or ketchup on your food. · Use low-sodium salad dressings, sauces, and ketchup. Or make your own salad dressings and sauces without adding salt. · Use less salt (or none) when recipes call for it. You can often use half the salt a recipe calls for without losing flavor. Other foods such as rice, pasta, and grains do not need added salt. · Rinse canned vegetables, and cook them in fresh water. This removes somebut not allof the salt. · Avoid water that is naturally high in sodium or that has been treated with water softeners, which add sodium. Call your local water company to find out the sodium content of your water supply. If you buy bottled water, read the label and choose a sodium-free brand. Avoid high-sodium foods · Avoid eating: ? Smoked, cured, salted, and canned meat, fish, and poultry. ? Ham, reza, hot dogs, and luncheon meats. ? Regular, hard, and processed cheese and regular peanut butter. ? Crackers with salted tops, and other salted snack foods such as pretzels, chips, and salted popcorn. ? Frozen prepared meals, unless labeled low-sodium. ? Canned and dried soups, broths, and bouillon, unless labeled sodium-free or low-sodium. ? Canned vegetables, unless labeled sodium-free or low-sodium. ? [place] fries, pizza, tacos, and other fast foods. ? Pickles, olives, ketchup, and other condiments, especially soy sauce, unless labeled sodium-free or low-sodium. Where can you learn more? Go to http://www.gray.com/ Enter O006 in the search box to learn more about \"Low Sodium Diet (2,000 Milligram): Care Instructions. \" Current as of: August 22, 2019               Content Version: 12.6 © 1762-4713 [employer]. Care instructions adapted under license by [employer] (which disclaims liability or warranty for this information). If you have questions about a medical condition or this instruction, always ask your healthcare professional. [place] [address] any warranty or liability for your use of this information. Leg and Ankle Edema: Care Instructions Your Care Instructions Swelling in the legs, ankles, and feet is called edema. It is common after you sit or stand for a while. Long plane flights or car rides often cause swelling in the legs and feet. You may also have swelling if you have to stand for long periods of time at your job. Problems with the veins in the legs (varicose veins) and changes in hormones can also cause swelling. Sometimes the swelling in the ankles and feet is caused by a more serious problem, such as heart failure, infection, blood clots, or liver or kidney disease. Follow-up care is a key part of your treatment and safety. Be sure to make and go to all appointments, and call your doctor if you are having problems. It's also a good idea to know your test results and keep a list of the medicines you take. How can you care for yourself at home? · If your doctor gave you medicine, take it as prescribed. Call your doctor if you think you are having a problem with your medicine. · Whenever you are resting, raise your legs up. Try to keep the swollen area higher than the level of your heart. · Take breaks from standing or sitting in one position. ? Walk around to increase the blood flow in your lower legs. ? Move your feet and ankles often while you stand, or tighten and relax your leg muscles. · Wear support stockings. Put them on in the morning, before swelling gets worse. · Eat a balanced diet. Lose weight if you need to. · Limit the amount of salt (sodium) in your diet. Salt holds fluid in the body and may increase swelling. When should you call for help? Call 911 anytime you think you may need emergency care. For example, call if: 
  · You have symptoms of a blood clot in your lung (called a pulmonary embolism). These may include: 
? Sudden chest pain. ? Trouble breathing. ? Coughing up blood. Call your doctor now or seek immediate medical care if: 
  · You have signs of a blood clot, such as: 
? Pain in your calf, back of the knee, thigh, or groin. ? Redness and swelling in your leg or groin.  
  · You have symptoms of infection, such as: 
? Increased pain, swelling, warmth, or redness. ? Red streaks or pus. ? A fever. Watch closely for changes in your health, and be sure to contact your doctor if: 
  · Your swelling is getting worse.  
  · You have new or worsening pain in your legs.  
  · You do not get better as expected. Where can you learn more? Go to http://www.[employer].com/ Enter G400 in the search box to learn more about \"Leg and Ankle Edema: Care Instructions. \" Current as of: June 26, 2019               Content Version: 12.6 © 4459-2265 [employer]. Care instructions adapted under license by [employer] (which disclaims liability or warranty for this information). If you have questions about a medical condition or this instruction, always ask your healthcare professional. [place] [address] any warranty or liability for your use of this information.

## 2021-04-05 ENCOUNTER — OFFICE VISIT (OUTPATIENT)
Dept: FAMILY MEDICINE CLINIC | Age: 76
End: 2021-04-05
Payer: MEDICARE

## 2021-04-05 VITALS
BODY MASS INDEX: 26.55 KG/M2 | SYSTOLIC BLOOD PRESSURE: 129 MMHG | OXYGEN SATURATION: 98 % | TEMPERATURE: 98.7 F | WEIGHT: 196 LBS | HEART RATE: 90 BPM | HEIGHT: 72 IN | RESPIRATION RATE: 20 BRPM | DIASTOLIC BLOOD PRESSURE: 76 MMHG

## 2021-04-05 DIAGNOSIS — R60.0 LOCALIZED EDEMA: Primary | ICD-10-CM

## 2021-04-05 DIAGNOSIS — E53.8 VITAMIN B12 DEFICIENCY NEUROPATHY (HCC): ICD-10-CM

## 2021-04-05 DIAGNOSIS — Z71.89 ADVICE GIVEN ABOUT COVID-19 VIRUS INFECTION: ICD-10-CM

## 2021-04-05 DIAGNOSIS — I10 HYPERTENSION, UNSPECIFIED TYPE: ICD-10-CM

## 2021-04-05 DIAGNOSIS — G63 VITAMIN B12 DEFICIENCY NEUROPATHY (HCC): ICD-10-CM

## 2021-04-05 DIAGNOSIS — E55.9 VITAMIN D DEFICIENCY: ICD-10-CM

## 2021-04-05 PROCEDURE — G0463 HOSPITAL OUTPT CLINIC VISIT: HCPCS | Performed by: FAMILY MEDICINE

## 2021-04-05 PROCEDURE — G8754 DIAS BP LESS 90: HCPCS | Performed by: FAMILY MEDICINE

## 2021-04-05 PROCEDURE — G8752 SYS BP LESS 140: HCPCS | Performed by: FAMILY MEDICINE

## 2021-04-05 PROCEDURE — G8536 NO DOC ELDER MAL SCRN: HCPCS | Performed by: FAMILY MEDICINE

## 2021-04-05 PROCEDURE — 1101F PT FALLS ASSESS-DOCD LE1/YR: CPT | Performed by: FAMILY MEDICINE

## 2021-04-05 PROCEDURE — 99213 OFFICE O/P EST LOW 20 MIN: CPT | Performed by: FAMILY MEDICINE

## 2021-04-05 PROCEDURE — G8510 SCR DEP NEG, NO PLAN REQD: HCPCS | Performed by: FAMILY MEDICINE

## 2021-04-05 PROCEDURE — 3017F COLORECTAL CA SCREEN DOC REV: CPT | Performed by: FAMILY MEDICINE

## 2021-04-05 PROCEDURE — G8419 CALC BMI OUT NRM PARAM NOF/U: HCPCS | Performed by: FAMILY MEDICINE

## 2021-04-05 PROCEDURE — G8427 DOCREV CUR MEDS BY ELIG CLIN: HCPCS | Performed by: FAMILY MEDICINE

## 2021-04-05 RX ORDER — LANOLIN ALCOHOL/MO/W.PET/CERES
1000 CREAM (GRAM) TOPICAL DAILY
Qty: 30 TAB | Refills: 6 | Status: SHIPPED | OUTPATIENT
Start: 2021-04-05

## 2021-04-05 RX ORDER — TORSEMIDE 20 MG/1
40 TABLET ORAL DAILY
Qty: 60 TAB | Refills: 1 | Status: SHIPPED | OUTPATIENT
Start: 2021-04-05 | End: 2021-06-07 | Stop reason: SDUPTHER

## 2021-04-05 RX ORDER — METOLAZONE 5 MG/1
5 TABLET ORAL DAILY
Qty: 90 TAB | Refills: 1 | Status: CANCELLED | OUTPATIENT
Start: 2021-04-05

## 2021-04-05 RX ORDER — GLUCOSAMINE SULFATE 1500 MG
POWDER IN PACKET (EA) ORAL
Qty: 30 CAP | Refills: 11
Start: 2021-04-05 | End: 2021-06-07 | Stop reason: SDUPTHER

## 2021-04-05 RX ORDER — POTASSIUM CHLORIDE 20 MEQ/1
20 TABLET, EXTENDED RELEASE ORAL DAILY
Qty: 90 TAB | Refills: 1 | Status: SHIPPED | OUTPATIENT
Start: 2021-04-05 | End: 2021-06-07 | Stop reason: SDUPTHER

## 2021-04-05 NOTE — PROGRESS NOTES
Chief Complaint   Patient presents with    Cholesterol Problem     follow up     1. Have you been to the ER, urgent care clinic since your last visit? Hospitalized since your last visit?no    2. Have you seen or consulted any other health care providers outside of the [address] [place] [address] since your last visit? Include any pap smears or colon screening.  no

## 2021-04-05 NOTE — PROGRESS NOTES
HISTORY OF PRESENT ILLNESS  Patricia White is a 78 y.o. male. Patient complains of edema. The location of the edema is lower leg(s) bilateral, ankle(s) bilateral, feet bilateral.  The edema has been moderate. Onset of symptoms was a few weeks ago, gradually worsening since that time. The edema is present all day. The patient states the problem is long-standing. on the pt last visits, the patient did not have much of the legs swelling, the weight was also better, Today the patient denies leg pain, denies rash, and legs lesion,and the denial of dizziness,   the wt went up     Current Outpatient Medications   Medication Sig Dispense Refill    potassium chloride (K-DUR, KLOR-CON) 20 mEq tablet Take 1 Tab by mouth daily. Indications: prevention of low potassium in the blood 90 Tab 1    cholecalciferol (Vitamin D3) 25 mcg (1,000 unit) cap One tab daily 30 Cap 11    torsemide (DEMADEX) 20 mg tablet Take 2 Tabs by mouth daily. 60 Tab 1    cyanocobalamin (Vitamin B-12) 1,000 mcg tablet Take 1 Tab by mouth daily. 30 Tab 6    metOLazone (ZAROXOLYN) 5 mg tablet Take 1 Tab by mouth daily. Indications: accumulation of fluid resulting from chronic heart failure 90 Tab 1    rosuvastatin (CRESTOR) 5 mg tablet Take 1 Tab by mouth nightly. 30 Tab 6    fluticasone propionate (FLONASE) 50 mcg/actuation nasal spray 1 [place] by Both Nostrils route daily. Indications: inflammation of the nose due to an allergy 1 Bottle 5    aspirin delayed-release 81 mg tablet Take  by mouth daily.        No Known Allergies  Past Medical History:   Diagnosis Date    Elevated PSA, less than 10 ng/ml 12/10/2012    Hypertension     Hypokalemia 7/25/2014    Localized edema 2/7/2017    Patient is full code 9/18/2018    Vitamin B12 deficiency neuropathy ([facility] [address] 6/18/2019    Vitamin D deficiency 7/8/2014    WBC decreased 7/6/2012     Past Surgical History:   Procedure Laterality Date    COLONOSCOPY,DIAGNOSTIC  2/5/2015         HX COLONOSCOPY  HX ORTHOPAEDIC      left knee surg in  1984     Family History   Problem Relation Age of Onset    Kidney Disease Father      Social History     Tobacco Use    Smoking status: Never Smoker    Smokeless tobacco: Never Used   Substance Use Topics    Alcohol use: No      Lab Results   Component Value Date/Time    WBC 3.8 01/25/2021 11:15 AM    HGB 13.6 01/25/2021 11:15 AM    HCT 40.2 01/25/2021 11:15 AM    PLATELET 660 71/58/3143 11:15 AM    MCV 85 01/25/2021 11:15 AM     Lab Results   Component Value Date/Time    GFR est non-AA 86 01/25/2021 11:15 AM    GFR est AA 99 01/25/2021 11:15 AM    Creatinine 0.87 01/25/2021 11:15 AM    BUN 11 01/25/2021 11:15 AM    Sodium 140 01/25/2021 11:15 AM    Potassium 4.1 01/25/2021 11:15 AM    Chloride 102 01/25/2021 11:15 AM    CO2 28 01/25/2021 11:15 AM    Magnesium 2.1 01/13/2016 09:03 AM        Review of Systems   Constitutional: Negative for chills, fever and malaise/fatigue. HENT: Negative for nosebleeds. Eyes: Negative for pain. Respiratory: Negative for cough and wheezing. Cardiovascular: Positive for leg swelling. Negative for chest pain. Gastrointestinal: Negative for constipation, diarrhea and nausea. Genitourinary: Negative for frequency. Musculoskeletal: Negative for joint pain and myalgias. Skin: Negative for rash. Neurological: Negative for loss of consciousness and headaches. Endo/Heme/Allergies: Does not bruise/bleed easily. Psychiatric/Behavioral: Negative for depression. The patient is not nervous/anxious and does not have insomnia. All other systems reviewed and are negative. Physical Exam  Vitals signs and nursing note reviewed. Constitutional:       Appearance: He is well-developed. HENT:      Head: Normocephalic and atraumatic. Mouth/Throat:      Pharynx: No oropharyngeal exudate. Eyes:      Conjunctiva/sclera: Conjunctivae normal.      Pupils: Pupils are equal, round, and reactive to light.    Neck: Musculoskeletal: Normal range of motion and neck supple. Thyroid: No thyromegaly. Vascular: No JVD. Cardiovascular:      Rate and Rhythm: Normal rate and regular rhythm. Heart sounds: Normal heart sounds. No murmur. No friction rub. Pulmonary:      Effort: Pulmonary effort is normal. No respiratory distress. Breath sounds: Normal breath sounds. No wheezing or rales. Abdominal:      General: Bowel sounds are normal. There is no distension. Palpations: Abdomen is soft. Tenderness: There is no abdominal tenderness. Musculoskeletal:         General: No tenderness. Left lower leg: Edema present. Lymphadenopathy:      Cervical: No cervical adenopathy. Skin:     General: Skin is warm. Findings: No erythema or rash. Neurological:      Mental Status: He is alert and oriented to person, place, and time. Deep Tendon Reflexes: Reflexes are normal and symmetric. Psychiatric:         Behavior: Behavior normal.         ASSESSMENT and PLAN  Diagnoses and all orders for this visit:    1. Localized edema  -     potassium chloride (K-DUR, KLOR-CON) 20 mEq tablet; Take 1 Tab by mouth daily. Indications: prevention of low potassium in the blood  -     torsemide (DEMADEX) 20 mg tablet; Take 2 Tabs by mouth daily. -     cyanocobalamin (Vitamin B-12) 1,000 mcg tablet; Take 1 Tab by mouth daily. 2. Hypertension, unspecified type  -     potassium chloride (K-DUR, KLOR-CON) 20 mEq tablet; Take 1 Tab by mouth daily. Indications: prevention of low potassium in the blood  -     torsemide (DEMADEX) 20 mg tablet; Take 2 Tabs by mouth daily. -     cyanocobalamin (Vitamin B-12) 1,000 mcg tablet; Take 1 Tab by mouth daily. 3. Vitamin B12 deficiency neuropathy (HCC)  -     potassium chloride (K-DUR, KLOR-CON) 20 mEq tablet; Take 1 Tab by mouth daily. Indications: prevention of low potassium in the blood    4.  Vitamin D deficiency  -     cholecalciferol (Vitamin D3) 25 mcg (1,000 unit) cap; One tab daily    5.  Advice given about COVID-19 virus infection        2 months f/u for the edema with the change of meds,  pt was advised about not to have an  increased salt intake, avoid long flying or long car trips, used the diuretics as needed, and do the support stockings daily and off night, elevation of involved area, if any shortness of breath, high weight gain call for advise, avoid smoking/ tobacco exposure,and  sedentary life style, Leg elevation at all times or most of the times, use of two pillows at nights while in bed, ambulation as possible, pt agreed

## 2021-04-05 NOTE — PATIENT INSTRUCTIONS
Leg and Ankle Edema: Care Instructions Your Care Instructions Swelling in the legs, ankles, and feet is called edema. It is common after you sit or stand for a while. Long plane flights or car rides often cause swelling in the legs and feet. You may also have swelling if you have to stand for long periods of time at your job. Problems with the veins in the legs (varicose veins) and changes in hormones can also cause swelling. Sometimes the swelling in the ankles and feet is caused by a more serious problem, such as heart failure, infection, blood clots, or liver or kidney disease. Follow-up care is a key part of your treatment and safety. Be sure to make and go to all appointments, and call your doctor if you are having problems. It's also a good idea to know your test results and keep a list of the medicines you take. How can you care for yourself at home? · If your doctor gave you medicine, take it as prescribed. Call your doctor if you think you are having a problem with your medicine. · Whenever you are resting, raise your legs up. Try to keep the swollen area higher than the level of your heart. · Take breaks from standing or sitting in one position. ? Walk around to increase the blood flow in your lower legs. ? Move your feet and ankles often while you stand, or tighten and relax your leg muscles. · Wear support stockings. Put them on in the morning, before swelling gets worse. · Eat a balanced diet. Lose weight if you need to. · Limit the amount of salt (sodium) in your diet. Salt holds fluid in the body and may increase swelling. When should you call for help? Call 911 anytime you think you may need emergency care. For example, call if: 
  · You have symptoms of a blood clot in your lung (called a pulmonary embolism). These may include: 
? Sudden chest pain. ? Trouble breathing. ? Coughing up blood.   
Call your doctor now or seek immediate medical care if: 
  · You have signs of a blood clot, such as: 
? Pain in your calf, back of the knee, thigh, or groin. ? Redness and swelling in your leg or groin.  
  · You have symptoms of infection, such as: 
? Increased pain, swelling, warmth, or redness. ? Red streaks or pus. ? A fever. Watch closely for changes in your health, and be sure to contact your doctor if: 
  · Your swelling is getting worse.  
  · You have new or worsening pain in your legs.  
  · You do not get better as expected. Where can you learn more? Go to http://www.gray.com/ Enter P175 in the search box to learn more about \"Leg and Ankle Edema: Care Instructions. \" Current as of: February 26, 2020               Content Version: 12.8 © 2006-2021 [employer]. Care instructions adapted under license by [employer] (which disclaims liability or warranty for this information). If you have questions about a medical condition or this instruction, always ask your healthcare professional. [place] [address] any warranty or liability for your use of this information.

## 2021-06-07 ENCOUNTER — OFFICE VISIT (OUTPATIENT)
Dept: FAMILY MEDICINE CLINIC | Age: 76
End: 2021-06-07
Payer: MEDICARE

## 2021-06-07 VITALS
SYSTOLIC BLOOD PRESSURE: 131 MMHG | OXYGEN SATURATION: 97 % | HEART RATE: 74 BPM | WEIGHT: 192.6 LBS | TEMPERATURE: 98.7 F | HEIGHT: 72 IN | RESPIRATION RATE: 18 BRPM | DIASTOLIC BLOOD PRESSURE: 71 MMHG | BODY MASS INDEX: 26.09 KG/M2

## 2021-06-07 DIAGNOSIS — I10 HYPERTENSION, UNSPECIFIED TYPE: ICD-10-CM

## 2021-06-07 DIAGNOSIS — R60.0 LOCALIZED EDEMA: Primary | ICD-10-CM

## 2021-06-07 DIAGNOSIS — E53.8 VITAMIN B12 DEFICIENCY NEUROPATHY (HCC): ICD-10-CM

## 2021-06-07 DIAGNOSIS — L30.9 ECZEMA, UNSPECIFIED TYPE: ICD-10-CM

## 2021-06-07 DIAGNOSIS — E55.9 VITAMIN D DEFICIENCY: ICD-10-CM

## 2021-06-07 DIAGNOSIS — G63 VITAMIN B12 DEFICIENCY NEUROPATHY (HCC): ICD-10-CM

## 2021-06-07 LAB
ALBUMIN SERPL-MCNC: 3.8 G/DL (ref 3.5–5)
ALBUMIN/GLOB SERPL: 1.3 {RATIO} (ref 1.1–2.2)
ALP SERPL-CCNC: 84 U/L (ref 45–117)
ALT SERPL-CCNC: 21 U/L (ref 12–78)
ANION GAP SERPL CALC-SCNC: 3 MMOL/L (ref 5–15)
AST SERPL-CCNC: 24 U/L (ref 15–37)
BILIRUB SERPL-MCNC: 0.3 MG/DL (ref 0.2–1)
BUN SERPL-MCNC: 14 MG/DL (ref 6–20)
BUN/CREAT SERPL: 12 (ref 12–20)
CALCIUM SERPL-MCNC: 9.1 MG/DL (ref 8.5–10.1)
CHLORIDE SERPL-SCNC: 104 MMOL/L (ref 97–108)
CO2 SERPL-SCNC: 34 MMOL/L (ref 21–32)
CREAT SERPL-MCNC: 1.17 MG/DL (ref 0.7–1.3)
GLOBULIN SER CALC-MCNC: 3 G/DL (ref 2–4)
GLUCOSE SERPL-MCNC: 104 MG/DL (ref 65–100)
POTASSIUM SERPL-SCNC: 3.3 MMOL/L (ref 3.5–5.1)
PROT SERPL-MCNC: 6.8 G/DL (ref 6.4–8.2)
SODIUM SERPL-SCNC: 141 MMOL/L (ref 136–145)

## 2021-06-07 PROCEDURE — 99214 OFFICE O/P EST MOD 30 MIN: CPT | Performed by: FAMILY MEDICINE

## 2021-06-07 PROCEDURE — G8510 SCR DEP NEG, NO PLAN REQD: HCPCS | Performed by: FAMILY MEDICINE

## 2021-06-07 PROCEDURE — 3017F COLORECTAL CA SCREEN DOC REV: CPT | Performed by: FAMILY MEDICINE

## 2021-06-07 PROCEDURE — G8419 CALC BMI OUT NRM PARAM NOF/U: HCPCS | Performed by: FAMILY MEDICINE

## 2021-06-07 PROCEDURE — G8536 NO DOC ELDER MAL SCRN: HCPCS | Performed by: FAMILY MEDICINE

## 2021-06-07 PROCEDURE — 1101F PT FALLS ASSESS-DOCD LE1/YR: CPT | Performed by: FAMILY MEDICINE

## 2021-06-07 PROCEDURE — G8427 DOCREV CUR MEDS BY ELIG CLIN: HCPCS | Performed by: FAMILY MEDICINE

## 2021-06-07 PROCEDURE — G8752 SYS BP LESS 140: HCPCS | Performed by: FAMILY MEDICINE

## 2021-06-07 PROCEDURE — G0463 HOSPITAL OUTPT CLINIC VISIT: HCPCS | Performed by: FAMILY MEDICINE

## 2021-06-07 PROCEDURE — G8754 DIAS BP LESS 90: HCPCS | Performed by: FAMILY MEDICINE

## 2021-06-07 RX ORDER — GLUCOSAMINE SULFATE 1500 MG
POWDER IN PACKET (EA) ORAL
Qty: 90 CAPSULE | Refills: 3 | Status: SHIPPED | OUTPATIENT
Start: 2021-06-07 | End: 2022-04-12 | Stop reason: SDUPTHER

## 2021-06-07 RX ORDER — POTASSIUM CHLORIDE 20 MEQ/1
20 TABLET, EXTENDED RELEASE ORAL DAILY
Qty: 90 TABLET | Refills: 1
Start: 2021-06-07 | End: 2021-06-16 | Stop reason: SDUPTHER

## 2021-06-07 RX ORDER — TORSEMIDE 20 MG/1
60 TABLET ORAL DAILY
Qty: 270 TABLET | Refills: 1 | Status: SHIPPED | OUTPATIENT
Start: 2021-06-07 | End: 2021-07-19 | Stop reason: SDUPTHER

## 2021-06-07 RX ORDER — TRIAMCINOLONE ACETONIDE 1 MG/G
CREAM TOPICAL 2 TIMES DAILY
Qty: 80 G | Refills: 1 | Status: SHIPPED | OUTPATIENT
Start: 2021-06-07

## 2021-06-07 NOTE — PROGRESS NOTES
Chief Complaint   Patient presents with    Hypertension     1. Have you been to the ER, urgent care clinic since your last visit? Hospitalized since your last visit? No    2. Have you seen or consulted any other health care providers outside of the [address] [place] [address] since your last visit? Include any pap smears or colon screening. No    Verified patient with two type of identifiers.  denies c/o at present

## 2021-06-07 NOTE — PROGRESS NOTES
HISTORY OF PRESENT ILLNESS  Erendira Kenyon is a 78 y.o. male,    Weight: 192 lb 9.6 oz (87.4 kg)  as of 6/7/2021 192 lb 9.6 oz (87.4 kg) 196 lb (88.9     HTN  Today pt present for Bp check and++= Compliancy w/ the bp meds, having had the low salt diet ,  has been active, patien does not obtain the bp at home ,, today the pt denies Chest Pain, has no legs swelling no lightheadedness,  GFR est non-AA 86 >59 mL/min/1.73 Final GFR est AA 99 >59 mL/min/1.73 Final BUN/Creatinine ratio 13 10 - 24 Final Sodium 140 134 - 144 mmol/L Final Potassium 4.1     Hx of recurrent Hypokalemia  Currently on klor con , most recent at 3.3 patient asymptomatic no dizziness no body ache denies any polyuria polydipsia     Currently working at night     Current Outpatient Medications   Medication Sig Dispense Refill    cholecalciferol (Vitamin D3) 25 mcg (1,000 unit) cap One tab daily 90 Capsule 3    torsemide (DEMADEX) 20 mg tablet Take 3 Tablets by mouth daily. 270 Tablet 1    potassium chloride (K-DUR, KLOR-CON) 20 mEq tablet Take 1 Tablet by mouth daily. Indications: prevention of low potassium in the blood 90 Tablet 1    triamcinolone acetonide (KENALOG) 0.1 % topical cream Apply  to affected area two (2) times a day. use thin layer 80 g 1    cyanocobalamin (Vitamin B-12) 1,000 mcg tablet Take 1 Tab by mouth daily. 30 Tab 6    rosuvastatin (CRESTOR) 5 mg tablet Take 1 Tab by mouth nightly. 30 Tab 6    fluticasone propionate (FLONASE) 50 mcg/actuation nasal spray 1 [place] by Both Nostrils route daily. Indications: inflammation of the nose due to an allergy 1 Bottle 5    aspirin delayed-release 81 mg tablet Take  by mouth daily.        No Known Allergies  Past Medical History:   Diagnosis Date    Elevated PSA, less than 10 ng/ml 12/10/2012    Hypertension     Hypokalemia 7/25/2014    Localized edema 2/7/2017    Patient is full code 9/18/2018    Vitamin B12 deficiency neuropathy ([facility] [address] 6/18/2019    Vitamin D deficiency 7/8/2014  WBC decreased 7/6/2012     Past Surgical History:   Procedure Laterality Date    COLONOSCOPY,DIAGNOSTIC  2/5/2015         HX COLONOSCOPY      HX ORTHOPAEDIC      left knee surg in  1984     Family History   Problem Relation Age of Onset    Kidney Disease Father      Social History     Tobacco Use    Smoking status: Never Smoker    Smokeless tobacco: Never Used   Substance Use Topics    Alcohol use: No      Lab Results   Component Value Date/Time    WBC 3.8 01/25/2021 11:15 AM    HGB 13.6 01/25/2021 11:15 AM    HCT 40.2 01/25/2021 11:15 AM    PLATELET 047 01/70/6229 11:15 AM    MCV 85 01/25/2021 11:15 AM     Lab Results   Component Value Date/Time    Hemoglobin A1c 6.5 (H) 01/13/2016 09:03 AM    Hemoglobin A1c 6.3 (H) 07/13/2015 09:18 AM    Hemoglobin A1c 6.4 (H) 01/26/2015 10:21 AM    Glucose 104 (H) 06/07/2021 10:25 AM    LDL, calculated 62 01/25/2021 11:15 AM    LDL, calculated 86 09/18/2019 12:55 PM    Creatinine 1.17 06/07/2021 10:25 AM         Review of Systems   Constitutional: Negative for chills and fever. HENT: Negative for congestion and nosebleeds. Eyes: Negative for blurred vision and pain. Respiratory: Negative for cough, shortness of breath and wheezing. Cardiovascular: Positive for leg swelling. Negative for chest pain. Gastrointestinal: Negative for constipation, diarrhea, nausea and vomiting. Genitourinary: Negative for dysuria and frequency. Musculoskeletal: Negative for joint pain and myalgias. Skin: Negative for itching and rash. Neurological: Negative for dizziness, loss of consciousness and headaches. Psychiatric/Behavioral: Negative for depression. The patient is not nervous/anxious and does not have insomnia. Physical Exam  Vitals and nursing note reviewed. Constitutional:       Appearance: He is well-developed. HENT:      Head: Normocephalic and atraumatic. Mouth/Throat:      Pharynx: No oropharyngeal exudate.    Eyes:      Conjunctiva/sclera: Conjunctivae normal.      Pupils: Pupils are equal, round, and reactive to light. Neck:      Thyroid: No thyromegaly. Vascular: No JVD. Cardiovascular:      Rate and Rhythm: Normal rate and regular rhythm. Heart sounds: Normal heart sounds. No murmur heard. No friction rub. Pulmonary:      Effort: Pulmonary effort is normal. No respiratory distress. Breath sounds: Normal breath sounds. No wheezing or rales. Abdominal:      General: Bowel sounds are normal. There is no distension. Palpations: Abdomen is soft. Tenderness: There is no abdominal tenderness. Musculoskeletal:         General: No tenderness. Cervical back: Normal range of motion and neck supple. Right lower leg: Edema present. Left lower leg: Edema present. Lymphadenopathy:      Cervical: No cervical adenopathy. Skin:     General: Skin is warm. Findings: No erythema or rash. Neurological:      Mental Status: He is alert and oriented to person, place, and time. Deep Tendon Reflexes: Reflexes are normal and symmetric. Psychiatric:         Behavior: Behavior normal.         ASSESSMENT and PLAN  Diagnoses and all orders for this visit:    1. Localized edema  -     torsemide (DEMADEX) 20 mg tablet; Take 3 Tablets by mouth daily.  -     METABOLIC PANEL, COMPREHENSIVE; Future  -     potassium chloride (K-DUR, KLOR-CON) 20 mEq tablet; Take 1 Tablet by mouth two (2) times a day. Indications: prevention of low potassium in the blood    2. Vitamin D deficiency  -     cholecalciferol (Vitamin D3) 25 mcg (1,000 unit) cap; One tab daily  -     METABOLIC PANEL, COMPREHENSIVE; Future    3. Hypertension, unspecified type  -     torsemide (DEMADEX) 20 mg tablet; Take 3 Tablets by mouth daily.  -     METABOLIC PANEL, COMPREHENSIVE; Future  -     potassium chloride (K-DUR, KLOR-CON) 20 mEq tablet; Take 1 Tablet by mouth two (2) times a day. Indications: prevention of low potassium in the blood    4.  Vitamin B12 deficiency neuropathy (HCC)  -     METABOLIC PANEL, COMPREHENSIVE; Future  -     potassium chloride (K-DUR, KLOR-CON) 20 mEq tablet; Take 1 Tablet by mouth two (2) times a day. Indications: prevention of low potassium in the blood    5. Eczema, unspecified type  -     triamcinolone acetonide (KENALOG) 0.1 % topical cream; Apply  to affected area two (2) times a day. use thin layer      Follow-up and Dispositions    · Return in about 6 weeks (around 7/19/2021), or if symptoms worsen or fail to improve.

## 2021-06-07 NOTE — PATIENT INSTRUCTIONS
Leg and Ankle Edema: Care Instructions  Your Care Instructions  Swelling in the legs, ankles, and feet is called edema. It is common after you sit or stand for a while. Long plane flights or car rides often cause swelling in the legs and feet. You may also have swelling if you have to stand for long periods of time at your job. Problems with the veins in the legs (varicose veins) and changes in hormones can also cause swelling. Sometimes the swelling in the ankles and feet is caused by a more serious problem, such as heart failure, infection, blood clots, or liver or kidney disease. Follow-up care is a key part of your treatment and safety. Be sure to make and go to all appointments, and call your doctor if you are having problems. It's also a good idea to know your test results and keep a list of the medicines you take. How can you care for yourself at home? If your doctor gave you medicine,      Dry Skin: Care Instructions  Your Care Instructions  Dry skin is a common problem, especially in areas where the air is very dry. Dry skin can also become a problem as you get older and lose natural oils that keep your skin moist.  A tendency toward dry, itchy skin may run in families. Some problems with the body's defenses (immune system), allergies, or an infection with a fungus may also cause patches of dry skin. An over-the-counter cream may help your dry skin. If your skin problem does not get better with home treatment, your doctor may prescribe ointment. You may need antibiotics if you have a skin infection. Follow-up care is a key part of your treatment and safety. Be sure to make and go to all appointments, and call your doctor if you are having problems. It's also a good idea to know your test results and keep a list of the medicines you take. How can you care for yourself at home? Showers and baths  Keep showers and baths short, and use warm or lukewarm water. Don't use hot water.  It takes off more of your skin's natural oils. Choose a mild skin cleanser like Aquanil or Cetaphil. If you are taking a bath, use a skin cleanser at the very end. Then rinse off with fresh water. Gently pat your skin dry with a towel. Skin creams and moisturizers  Apply moisturizer or skin cream right away (within 3 minutes) after a bath or shower. Use a moisturizer at other times too, as often as you need it. Moisturizing creams are better than lotions. Try brands like CeraVe cream, Cetaphil cream, or Eucerin cream.  Other tips  When washing clothes, use a small amount of detergent. Don't use fabric softeners or dryer sheets. For small areas of itchy skin, try an over-the-counter 1% hydrocortisone cream.  If you have very dry hands, spread petroleum jelly (such as Vaseline) on your hands before bed. Wear thin cotton gloves while you sleep. If your feet are dry, spread Vaseline on them and wear socks while you sleep. When should you call for help? Call your doctor now or seek immediate medical care if:    You have signs of infection, such as:  Pain, warmth, or swelling in the skin. Red streaks near a wound in the skin. Pus coming from a wound in your skin. A fever. Watch closely for changes in your health, and be sure to contact your doctor if:    You do not get better as expected. Where can you learn more? Go to http://www.gray.com/  Enter [id number] in the search box to learn more about \"Dry Skin: Care Instructions. \"  Current as of: July 2, 2020               Content Version: 12.8  © 6900-1516 [employer]. Care instructions adapted under license by [employer] (which disclaims liability or warranty for this information). If you have questions about a medical condition or this instruction, always ask your healthcare professional. [address] any warranty or liability for your use of this information. · susan it as prescribed.  Call your doctor if you think you are having a problem with your medicine. · Whenever you are resting, raise your legs up. Try to keep the swollen area higher than the level of your heart. · Take breaks from standing or sitting in one position. ? Walk around to increase the blood flow in your lower legs. ? Move your feet and ankles often while you stand, or tighten and relax your leg muscles. · Wear support stockings. Put them on in the morning, before swelling gets worse. · Eat a balanced diet. Lose weight if you need to. · Limit the amount of salt (sodium) in your diet. Salt holds fluid in the body and may increase swelling. When should you call for help? Call 911 anytime you think you may need emergency care. For example, call if:    · You have symptoms of a blood clot in your lung (called a pulmonary embolism). These may include:  ? Sudden chest pain. ? Trouble breathing. ? Coughing up blood. Call your doctor now or seek immediate medical care if:    · You have signs of a blood clot, such as:  ? Pain in your calf, back of the knee, thigh, or groin. ? Redness and swelling in your leg or groin.     · You have symptoms of infection, such as:  ? Increased pain, swelling, warmth, or redness. ? Red streaks or pus. ? A fever. Watch closely for changes in your health, and be sure to contact your doctor if:    · Your swelling is getting worse.     · You have new or worsening pain in your legs.     · You do not get better as expected. Where can you learn more? Go to http://www.gray.com/  Enter F566 in the search box to learn more about \"Leg and Ankle Edema: Care Instructions. \"  Current as of: February 26, 2020               Content Version: 12.8  © 6556-5256 [employer]. Care instructions adapted under license by [employer] (which disclaims liability or warranty for this information).  If you have questions about a medical condition or this instruction, always ask your healthcare professional. [address] any warranty or liability for your use of this information.

## 2021-06-16 RX ORDER — POTASSIUM CHLORIDE 20 MEQ/1
20 TABLET, EXTENDED RELEASE ORAL 2 TIMES DAILY
Qty: 180 TABLET | Refills: 1 | Status: SHIPPED | OUTPATIENT
Start: 2021-06-16 | End: 2021-07-19 | Stop reason: SDUPTHER

## 2021-07-19 ENCOUNTER — OFFICE VISIT (OUTPATIENT)
Dept: FAMILY MEDICINE CLINIC | Age: 76
End: 2021-07-19
Payer: MEDICARE

## 2021-07-19 VITALS
HEART RATE: 78 BPM | SYSTOLIC BLOOD PRESSURE: 125 MMHG | RESPIRATION RATE: 16 BRPM | HEIGHT: 72 IN | OXYGEN SATURATION: 99 % | WEIGHT: 199.6 LBS | TEMPERATURE: 98.4 F | BODY MASS INDEX: 27.04 KG/M2 | DIASTOLIC BLOOD PRESSURE: 84 MMHG

## 2021-07-19 DIAGNOSIS — R60.0 LOCALIZED EDEMA: Primary | ICD-10-CM

## 2021-07-19 DIAGNOSIS — I10 HYPERTENSION, UNSPECIFIED TYPE: ICD-10-CM

## 2021-07-19 DIAGNOSIS — G63 VITAMIN B12 DEFICIENCY NEUROPATHY (HCC): ICD-10-CM

## 2021-07-19 DIAGNOSIS — E53.8 VITAMIN B12 DEFICIENCY NEUROPATHY (HCC): ICD-10-CM

## 2021-07-19 LAB
ANION GAP SERPL CALC-SCNC: 3 MMOL/L (ref 5–15)
BUN SERPL-MCNC: 10 MG/DL (ref 6–20)
BUN/CREAT SERPL: 8 (ref 12–20)
CALCIUM SERPL-MCNC: 9 MG/DL (ref 8.5–10.1)
CHLORIDE SERPL-SCNC: 107 MMOL/L (ref 97–108)
CO2 SERPL-SCNC: 32 MMOL/L (ref 21–32)
COMMENT, HOLDF: NORMAL
CREAT SERPL-MCNC: 1.33 MG/DL (ref 0.7–1.3)
GLUCOSE SERPL-MCNC: 108 MG/DL (ref 65–100)
POTASSIUM SERPL-SCNC: 4.3 MMOL/L (ref 3.5–5.1)
SAMPLES BEING HELD,HOLD: NORMAL
SODIUM SERPL-SCNC: 142 MMOL/L (ref 136–145)

## 2021-07-19 PROCEDURE — G0463 HOSPITAL OUTPT CLINIC VISIT: HCPCS | Performed by: FAMILY MEDICINE

## 2021-07-19 PROCEDURE — G8536 NO DOC ELDER MAL SCRN: HCPCS | Performed by: FAMILY MEDICINE

## 2021-07-19 PROCEDURE — G8432 DEP SCR NOT DOC, RNG: HCPCS | Performed by: FAMILY MEDICINE

## 2021-07-19 PROCEDURE — G8754 DIAS BP LESS 90: HCPCS | Performed by: FAMILY MEDICINE

## 2021-07-19 PROCEDURE — 1101F PT FALLS ASSESS-DOCD LE1/YR: CPT | Performed by: FAMILY MEDICINE

## 2021-07-19 PROCEDURE — 3017F COLORECTAL CA SCREEN DOC REV: CPT | Performed by: FAMILY MEDICINE

## 2021-07-19 PROCEDURE — G8427 DOCREV CUR MEDS BY ELIG CLIN: HCPCS | Performed by: FAMILY MEDICINE

## 2021-07-19 PROCEDURE — G8419 CALC BMI OUT NRM PARAM NOF/U: HCPCS | Performed by: FAMILY MEDICINE

## 2021-07-19 PROCEDURE — 99213 OFFICE O/P EST LOW 20 MIN: CPT | Performed by: FAMILY MEDICINE

## 2021-07-19 PROCEDURE — G8752 SYS BP LESS 140: HCPCS | Performed by: FAMILY MEDICINE

## 2021-07-19 RX ORDER — POTASSIUM CHLORIDE 20 MEQ/1
20 TABLET, EXTENDED RELEASE ORAL 2 TIMES DAILY
Qty: 180 TABLET | Refills: 1 | Status: SHIPPED | OUTPATIENT
Start: 2021-07-19 | End: 2021-10-19 | Stop reason: SDUPTHER

## 2021-07-19 RX ORDER — POTASSIUM CHLORIDE 20 MEQ/1
20 TABLET, EXTENDED RELEASE ORAL 2 TIMES DAILY
Qty: 180 TABLET | Refills: 1 | Status: SHIPPED | OUTPATIENT
Start: 2021-07-19 | End: 2021-07-19 | Stop reason: SDUPTHER

## 2021-07-19 RX ORDER — CYANOCOBALAMIN 1000 UG/ML
1000 INJECTION, SOLUTION INTRAMUSCULAR; SUBCUTANEOUS ONCE
Qty: 1 ML | Refills: 0
Start: 2021-07-19 | End: 2021-07-19

## 2021-07-19 RX ORDER — TORSEMIDE 20 MG/1
60 TABLET ORAL DAILY
Qty: 270 TABLET | Refills: 1 | Status: SHIPPED | OUTPATIENT
Start: 2021-07-19 | End: 2021-10-19 | Stop reason: SDUPTHER

## 2021-07-19 NOTE — PATIENT INSTRUCTIONS
Leg and Ankle Edema: Care Instructions  Your Care Instructions  Swelling in the legs, ankles, and feet is called edema. It is common after you sit or stand for a while. Long plane flights or car rides often cause swelling in the legs and feet. You may also have swelling if you have to stand for long periods of time at your job. Problems with the veins in the legs (varicose veins) and changes in hormones can also cause swelling. Sometimes the swelling in the ankles and feet is caused by a more serious problem, such as heart failure, infection, blood clots, or liver or kidney disease. Follow-up care is a key part of your treatment and safety. Be sure to make and go to all appointments, and call your doctor if you are having problems. It's also a good idea to know your test results and keep a list of the medicines you take. How can you care for yourself at home? · If your doctor gave you medicine, take it as prescribed. Call your doctor if you think you are having a problem with your medicine. · Whenever you are resting, raise your legs up. Try to keep the swollen area higher than the level of your heart. · Take breaks from standing or sitting in one position. ? Walk around to increase the blood flow in your lower legs. ? Move your feet and ankles often while you stand, or tighten and relax your leg muscles. · Wear support stockings. Put them on in the morning, before swelling gets worse. · Eat a balanced diet. Lose weight if you need to. · Limit the amount of salt (sodium) in your diet. Salt holds fluid in the body and may increase swelling. When should you call for help? Call 911 anytime you think you may need emergency care. For example, call if:    · You have symptoms of a blood clot in your lung (called a pulmonary embolism). These may include:  ? Sudden chest pain. ? Trouble breathing. ? Coughing up blood.    Call your doctor now or seek immediate medical care if:    · You have signs of a blood clot, such as:  ? Pain in your calf, back of the knee, thigh, or groin. ? Redness and swelling in your leg or groin.     · You have symptoms of infection, such as:  ? Increased pain, swelling, warmth, or redness. ? Red streaks or pus. ? A fever. Watch closely for changes in your health, and be sure to contact your doctor if:    · Your swelling is getting worse.     · You have new or worsening pain in your legs.     · You do not get better as expected. Where can you learn more? Go to http://www.gray.com/  Enter X309 in the search box to learn more about \"Leg and Ankle Edema: Care Instructions. \"  Current as of: February 26, 2020               Content Version: 12.8  © 2006-2021 [employer]. Care instructions adapted under license by [employer] (which disclaims liability or warranty for this information). If you have questions about a medical condition or this instruction, always ask your healthcare professional. [place] [address] any warranty or liability for your use of this information.

## 2021-07-19 NOTE — PROGRESS NOTES
HISTORY OF PRESENT ILLNESS  Sandra Webb is a 78 y.o. male, present with current medical history of HTN, the current medications social family and surgical history lists,  review'd today with the following complaint and concern of edema. , pt states that he does a lot of standing and walking at his job, last visits his diureticshas been repeatedly increased, trying to have a low salt diet but so far the leg swelling not better,  lower legs bilateral, ankles bilateral, feet bilateral, it has been severe. The condition has been long-standing, and gradually worsening . The edema is present all day, on the pt last visits, the patient did not have much of the legs swelling,  Today the patient denies leg pain, denies rash, and legs lesion,and the denial of dizziness,   the wt went up since last visit        Current Outpatient Medications   Medication Sig Dispense Refill    cyanocobalamin (Vitamin B-12) 1,000 mcg/mL injection 1 mL by IntraMUSCular route once for 1 dose. 1 mL 0    potassium chloride (K-DUR, KLOR-CON) 20 mEq tablet Take 1 Tablet by mouth two (2) times a day. Indications: prevention of low potassium in the blood 180 Tablet 1    cholecalciferol (Vitamin D3) 25 mcg (1,000 unit) cap One tab daily 90 Capsule 3    torsemide (DEMADEX) 20 mg tablet Take 3 Tablets by mouth daily. 270 Tablet 1    triamcinolone acetonide (KENALOG) 0.1 % topical cream Apply  to affected area two (2) times a day. use thin layer 80 g 1    cyanocobalamin (Vitamin B-12) 1,000 mcg tablet Take 1 Tab by mouth daily. 30 Tab 6    rosuvastatin (CRESTOR) 5 mg tablet Take 1 Tab by mouth nightly. 30 Tab 6    fluticasone propionate (FLONASE) 50 mcg/actuation nasal spray 1 [place] by Both Nostrils route daily. Indications: inflammation of the nose due to an allergy 1 Bottle 5    aspirin delayed-release 81 mg tablet Take  by mouth daily.        No Known Allergies  Past Medical History:   Diagnosis Date    Elevated PSA, less than 10 ng/ml 12/10/2012    Hypertension     Hypokalemia 7/25/2014    Localized edema 2/7/2017    Patient is full code 9/18/2018    Vitamin B12 deficiency neuropathy ([facility] [address] 6/18/2019    Vitamin D deficiency 7/8/2014    WBC decreased 7/6/2012     Past Surgical History:   Procedure Laterality Date    COLONOSCOPY,DIAGNOSTIC  2/5/2015         HX COLONOSCOPY      HX ORTHOPAEDIC      left knee surg in  1984     Family History   Problem Relation Age of Onset    Kidney Disease Father      Social History     Tobacco Use    Smoking status: Never Smoker    Smokeless tobacco: Never Used   Substance Use Topics    Alcohol use: No      Lab Results   Component Value Date/Time    Hemoglobin A1c 6.5 (H) 01/13/2016 09:03 AM    Hemoglobin A1c 6.3 (H) 07/13/2015 09:18 AM    Hemoglobin A1c 6.4 (H) 01/26/2015 10:21 AM    Glucose 104 (H) 06/07/2021 10:25 AM    LDL, calculated 62 01/25/2021 11:15 AM    LDL, calculated 86 09/18/2019 12:55 PM    Creatinine 1.17 06/07/2021 10:25 AM      Lab Results   Component Value Date/Time    Cholesterol, total 119 01/25/2021 11:15 AM    HDL Cholesterol 43 01/25/2021 11:15 AM    LDL, calculated 62 01/25/2021 11:15 AM    LDL, calculated 86 09/18/2019 12:55 PM    Triglyceride 65 01/25/2021 11:15 AM        Review of Systems   Constitutional: Negative for chills and fever. HENT: Negative for congestion and nosebleeds. Eyes: Negative for blurred vision and pain. Respiratory: Negative for cough, shortness of breath and wheezing. Cardiovascular: Positive for leg swelling. Negative for chest pain. Gastrointestinal: Negative for constipation, diarrhea, nausea and vomiting. Genitourinary: Negative for dysuria and frequency. Musculoskeletal: Negative for joint pain and myalgias. Skin: Negative for itching and rash. Neurological: Negative for dizziness, loss of consciousness and headaches. Psychiatric/Behavioral: Negative for depression.  The patient is not nervous/anxious and does not have insomnia. Physical Exam  Vitals and nursing note reviewed. Constitutional:       Appearance: He is well-developed. HENT:      Head: Normocephalic and atraumatic. Mouth/Throat:      Pharynx: No oropharyngeal exudate. Eyes:      Conjunctiva/sclera: Conjunctivae normal.      Pupils: Pupils are equal, round, and reactive to light. Neck:      Thyroid: No thyromegaly. Vascular: No JVD. Cardiovascular:      Rate and Rhythm: Normal rate and regular rhythm. Heart sounds: Normal heart sounds. No murmur heard. No friction rub. Pulmonary:      Effort: Pulmonary effort is normal. No respiratory distress. Breath sounds: Normal breath sounds. No wheezing or rales. Abdominal:      General: Bowel sounds are normal. There is no distension. Palpations: Abdomen is soft. Tenderness: There is no abdominal tenderness. Musculoskeletal:         General: No tenderness. Cervical back: Normal range of motion and neck supple. Right lower leg: Edema present. Left lower leg: Edema present. Lymphadenopathy:      Cervical: No cervical adenopathy. Skin:     General: Skin is warm. Findings: No erythema or rash. Neurological:      Mental Status: He is alert and oriented to person, place, and time. Deep Tendon Reflexes: Reflexes are normal and symmetric. Psychiatric:         Behavior: Behavior normal.         ASSESSMENT and PLAN  Diagnoses and all orders for this visit:    1. Localized edema  -     torsemide (DEMADEX) 20 mg tablet; Take 3 Tablets by mouth daily. -     potassium chloride (K-DUR, KLOR-CON) 20 mEq tablet; Take 1 Tablet by mouth two (2) times a day. Indications: prevention of low potassium in the blood  -     METABOLIC PANEL, BASIC; Future  -     REFERRAL TO CARDIOLOGY    2. Hypertension, unspecified type  -     torsemide (DEMADEX) 20 mg tablet; Take 3 Tablets by mouth daily. -     potassium chloride (K-DUR, KLOR-CON) 20 mEq tablet;  Take 1 Tablet by mouth two (2) times a day. Indications: prevention of low potassium in the blood  -     METABOLIC PANEL, BASIC; Future  -     REFERRAL TO CARDIOLOGY    3. Vitamin B12 deficiency neuropathy (HCC)  -     torsemide (DEMADEX) 20 mg tablet; Take 3 Tablets by mouth daily. -     potassium chloride (K-DUR, KLOR-CON) 20 mEq tablet; Take 1 Tablet by mouth two (2) times a day. Indications: prevention of low potassium in the blood  -     METABOLIC PANEL, BASIC; Future  -     REFERRAL TO CARDIOLOGY    Other orders  -     cyanocobalamin (Vitamin B-12) 1,000 mcg/mL injection; 1 mL by IntraMUSCular route once for 1 dose.   -     SAMPLES BEING HELD          pt currently not responding to the high dose of loop Diuretic, will have the pat to be evaluated by the cardiologist for further care        pt was advised about not to have an  increased salt intake, avoid long flying or long car trips, used the diuretics as needed, and do the support stockings daily and off night, elevation of involved area, if any shortness of breath, high weight gain call for advise, avoid smoking/ tobacco exposure,and  sedentary life style, Leg elevation at all times or most of the times, use of two pillows at nights while in bed, ambulation as possible, pt agreed

## 2021-07-19 NOTE — PROGRESS NOTES
1. Have you been to the ER, urgent care clinic since your last visit? Hospitalized since your last visit? No    2. Have you seen or consulted any other health care providers outside of the [address] [place] [address] since your last visit? Include any pap smears or colon screening. No     Chief Complaint   Patient presents with    Follow-up     No complaints. Patient identity verified with two type identifiers.

## 2021-08-10 ENCOUNTER — OFFICE VISIT (OUTPATIENT)
Dept: CARDIOLOGY CLINIC | Age: 76
End: 2021-08-10
Payer: MEDICARE

## 2021-08-10 VITALS
WEIGHT: 188.7 LBS | SYSTOLIC BLOOD PRESSURE: 110 MMHG | HEART RATE: 90 BPM | RESPIRATION RATE: 16 BRPM | OXYGEN SATURATION: 97 % | DIASTOLIC BLOOD PRESSURE: 60 MMHG | HEIGHT: 72 IN | BODY MASS INDEX: 25.56 KG/M2

## 2021-08-10 DIAGNOSIS — R09.89 DECREASED PEDAL PULSES: ICD-10-CM

## 2021-08-10 DIAGNOSIS — M79.89 LEG SWELLING: ICD-10-CM

## 2021-08-10 DIAGNOSIS — M79.604 PAIN IN BOTH LOWER EXTREMITIES: ICD-10-CM

## 2021-08-10 DIAGNOSIS — E78.00 HYPERCHOLESTEREMIA: Primary | ICD-10-CM

## 2021-08-10 DIAGNOSIS — M79.605 PAIN IN BOTH LOWER EXTREMITIES: ICD-10-CM

## 2021-08-10 PROCEDURE — 1101F PT FALLS ASSESS-DOCD LE1/YR: CPT | Performed by: INTERNAL MEDICINE

## 2021-08-10 PROCEDURE — G8752 SYS BP LESS 140: HCPCS | Performed by: INTERNAL MEDICINE

## 2021-08-10 PROCEDURE — G8536 NO DOC ELDER MAL SCRN: HCPCS | Performed by: INTERNAL MEDICINE

## 2021-08-10 PROCEDURE — G8510 SCR DEP NEG, NO PLAN REQD: HCPCS | Performed by: INTERNAL MEDICINE

## 2021-08-10 PROCEDURE — 99204 OFFICE O/P NEW MOD 45 MIN: CPT | Performed by: INTERNAL MEDICINE

## 2021-08-10 PROCEDURE — G8419 CALC BMI OUT NRM PARAM NOF/U: HCPCS | Performed by: INTERNAL MEDICINE

## 2021-08-10 PROCEDURE — G8754 DIAS BP LESS 90: HCPCS | Performed by: INTERNAL MEDICINE

## 2021-08-10 PROCEDURE — G0463 HOSPITAL OUTPT CLINIC VISIT: HCPCS | Performed by: INTERNAL MEDICINE

## 2021-08-10 PROCEDURE — 3017F COLORECTAL CA SCREEN DOC REV: CPT | Performed by: INTERNAL MEDICINE

## 2021-08-10 PROCEDURE — G8427 DOCREV CUR MEDS BY ELIG CLIN: HCPCS | Performed by: INTERNAL MEDICINE

## 2021-08-10 NOTE — PROGRESS NOTES
Chief Complaint   Patient presents with    Referral / Consult    Leg Swelling    Hypertension    Cholesterol Problem     Patient C/O bilateral lower extremities swelling Left > Right

## 2021-08-10 NOTE — PROGRESS NOTES
8/10/2021 1:30 PM      Subjective:     Soni Sharma is seen in vascular clinic for further evaluation of b/l LE swelling, skin discoloration and pain. 1.  Have you ever had vein stripping surgery NO       2. Have you ever had vein injections? NO        3. Have you ever had a blood clot? NO       4. Have you ever had phlebitis? NO                                                                        Does anyone in your family have (or used to have) varicose veins, spider veins, leg ulcers or swollen legs? Father  NO  Mother NO  Brother(s) NO  Sister(s) NO  Other NO           1. Do you experience any of the following in your legs? Aching/pain? [] One leg [x] Both legs  Heaviness? [] One leg [x] Both legs  Tiredness/fatigue? [] One leg [x] Both legs  Itching/burning? [] One leg [] Both legs  Swollen ankles? [] One leg [x] Both legs  Leg cramps? [] One leg [] Both legs  Restless legs? [] One leg [] Both legs  Throbbing? [] One leg [] Both legs      2. Have your veins gotten worse in recent months? YES        3. Do you take any medication for pain (i.e., Advil, Motrin)  NO           4. Do you elevate your legs to relieve discomfort? NO        5. Do you exercise? NO        6. Do you wear prescription compression stockings? NO           7. Do you wear light support hose (i.e., [employer])? NO                    8.    Do you have any problem walking? NO                                 9.   What type of work do you do? [employer]          How long do you stand (hours per day) at work? 8         10. Have you ever had any test(s) done on your veins? NO          11. Were you diagnosed with saphenous vein reflux?   NO       He denies chest pain, chest pressure/discomfort, dyspnea, palpitations, irregular heart beats, near-syncope, syncope, fatigue, orthopnea, paroxysmal nocturnal dyspnea, exertional chest pressure/discomfort, tachypnea. Visit Vitals  /60 (BP 1 Location: Left upper arm, BP Patient Position: Sitting, BP Cuff Size: Large adult)   Pulse 90   Resp 16   Ht 6' (1.829 m)   Wt 188 lb 11.2 oz (85.6 kg)   SpO2 97%   BMI 25.59 kg/m²       Current Outpatient Medications   Medication Sig    torsemide (DEMADEX) 20 mg tablet Take 3 Tablets by mouth daily.  potassium chloride (K-DUR, KLOR-CON) 20 mEq tablet Take 1 Tablet by mouth two (2) times a day. Indications: prevention of low potassium in the blood    cyanocobalamin (Vitamin B-12) 1,000 mcg tablet Take 1 Tab by mouth daily.  rosuvastatin (CRESTOR) 5 mg tablet Take 1 Tab by mouth nightly.  fluticasone propionate (FLONASE) 50 mcg/actuation nasal spray 1 [employer] by Both Nostrils route daily. Indications: inflammation of the nose due to an allergy (Patient taking differently: 1 Spray by Both Nostrils route as needed. Indications: inflammation of the nose due to an allergy)    aspirin delayed-release 81 mg tablet Take  by mouth daily.  cholecalciferol (Vitamin D3) 25 mcg (1,000 unit) cap One tab daily (Patient taking differently: Take 400 Units/day by mouth. One tab daily)    triamcinolone acetonide (KENALOG) 0.1 % topical cream Apply  to affected area two (2) times a day. use thin layer     No current facility-administered medications for this visit.          Objective:      Visit Vitals  /60 (BP 1 Location: Left upper arm, BP Patient Position: Sitting, BP Cuff Size: Large adult)   Pulse 90   Resp 16   Ht 6' (1.829 m)   Wt 188 lb 11.2 oz (85.6 kg)   SpO2 97%   BMI 25.59 kg/m²       Past Medical History:   Diagnosis Date    Elevated PSA, less than 10 ng/ml 12/10/2012    Hyperlipidemia     Hypertension     Hypokalemia 7/25/2014    Localized edema 2/7/2017    Long term current use of anticoagulant therapy     Patient is full code 9/18/2018    Syncope     Vitamin B12 deficiency neuropathy ([facility] [address] 6/18/2019    Vitamin D deficiency 7/8/2014    WBC decreased 7/6/2012      Past Surgical History:   Procedure Laterality Date    COLONOSCOPY,DIAGNOSTIC  2/5/2015         HX COLONOSCOPY      HX ORTHOPAEDIC      left knee surg in  1984     No Known Allergies   Family History   Problem Relation Age of Onset    Kidney Disease Father     Hypertension Mother       Social History     Socioeconomic History    Marital status: SINGLE     Spouse name: Not on file    Number of children: Not on file    Years of education: Not on file    Highest education level: Not on file   Occupational History    Not on file   Tobacco Use    Smoking status: Never Smoker    Smokeless tobacco: Never Used   Vaping Use    Vaping Use: Never used   Substance and Sexual Activity    Alcohol use: No    Drug use: No    Sexual activity: Not Currently     Comment: single   Other Topics Concern    Not on file   Social History Narrative    Not on file     Social Determinants of Health     Financial Resource Strain:     Difficulty of Paying Living Expenses:    Food Insecurity:     Worried About Running Out of Food in the Last Year:     Ran Out of Food in the Last Year:    Transportation Needs:     Lack of Transportation (Medical):  Lack of Transportation (Non-Medical):    Physical Activity:     Days of Exercise per Week:     Minutes of Exercise per Session:    Stress:     Feeling of Stress :    Social Connections:     Frequency of Communication with Friends and Family:     Frequency of Social Gatherings with Friends and Family:     Attends [employer]oration[employer] Services:     Active Member of Clubs or Organizations:     Attends Club or Organization Meetings:     Marital Status:    Intimate Partner Violence:     Fear of Current or Ex-Partner:     Emotionally Abused:     Physically Abused:     Sexually Abused:      Peripheral Vascular   Upper Extremity: Inspection - Bilateral - No Cyanotic nailbeds or Digital clubbing. Lower Extremity:   Palpation:   Femoral pulse - Rt.   [x] normal [] weak  [] non palpable     Lt. [x] normal  [] weak  [] non palpable                             Popliteal pulse - Rt. [x] normal  [] weak  [] non palpable     Lt. [x] normal  [] weak  [] non palpable       Dorsalis pedis pulse - Rt.  [] normal  [x] weak  [] non palpable     Lt.   [] normal  [x] weak  [] non palpable    Posterior tibia pulse - Rt.  [] normal  [x] weak  [] non palpable     Lt. [x] normal  [] weak  [] non palpable                                             Edema:       Rt. Leg  [x]             Lt. Leg  [x]  Telangiectasia & spider veins: Rt. Leg  []             Lt. Leg  []  Varicose veins:   Rt. Leg  []             Lt. Leg  []   Skin pigmentation:   Rt. Leg  [x]             Lt. Leg  [x]   Healed venous ulcer:   Rt. Leg  []             Lt. Leg  []                                            Assessment:       ICD-10-CM ICD-9-CM    1. Hypercholesteremia  E78.00 272.0 ECHO ADULT COMPLETE      ANKLE BRACHIAL INDEX      DUPLEX LOWER EXT VENOUS BILAT   2. Leg swelling  M79.89 729.81 ECHO ADULT COMPLETE      ANKLE BRACHIAL INDEX      DUPLEX LOWER EXT VENOUS BILAT      REFERRAL TO PODIATRY   3. Pain in both lower extremities  M79.604 729.5 ECHO ADULT COMPLETE    M79.605  ANKLE BRACHIAL INDEX      DUPLEX LOWER EXT VENOUS BILAT      REFERRAL TO PODIATRY   4. Decreased pedal pulses  R09.89 785.9 ECHO ADULT COMPLETE      ANKLE BRACHIAL INDEX      DUPLEX LOWER EXT VENOUS BILAT      REFERRAL TO PODIATRY       Plan:     1. Leg swelling, pain and decrease pedal pulses: check BARBARA, venous reflux study and Echo. Further recommendations based upon test results. 2. On statin. 3. BP controlled. 4. Needs podiatry care. Will refer to podiatrist. D/w patient.      Bea Mendoza MD  8/10/2021

## 2021-08-17 ENCOUNTER — ANCILLARY PROCEDURE (OUTPATIENT)
Dept: CARDIOLOGY CLINIC | Age: 76
End: 2021-08-17
Payer: MEDICARE

## 2021-08-17 DIAGNOSIS — E78.00 HYPERCHOLESTEREMIA: ICD-10-CM

## 2021-08-17 DIAGNOSIS — M79.605 PAIN IN BOTH LOWER EXTREMITIES: ICD-10-CM

## 2021-08-17 DIAGNOSIS — R09.89 DECREASED PEDAL PULSES: ICD-10-CM

## 2021-08-17 DIAGNOSIS — M79.604 PAIN IN BOTH LOWER EXTREMITIES: ICD-10-CM

## 2021-08-17 DIAGNOSIS — M79.89 LEG SWELLING: ICD-10-CM

## 2021-08-17 PROCEDURE — 93970 EXTREMITY STUDY: CPT | Performed by: INTERNAL MEDICINE

## 2021-08-19 ENCOUNTER — TELEPHONE (OUTPATIENT)
Dept: CARDIOLOGY CLINIC | Age: 76
End: 2021-08-19

## 2021-08-19 LAB
LEFT GSV AT KNEE DIAM: 0.34 CM
LEFT GSV AT KNEE RFX: 2048 S
LEFT GSV BK MID DIAM: 0.4 CM
LEFT GSV BK MID RFX: 2092 S
LEFT GSV JUNC DIAM: 0.78 CM
LEFT GSV JUNC RFX: 0 S
LEFT GSV THIGH PROX DIAM: 0.62 CM
LEFT GSV THIGH PROX RFX: 0 S
LEFT PERFORATOR DIAM: 0.29 CM
LEFT PERFORATOR RFX: 0 S
LEFT SSV PROX DIAM: 0.48 CM
LEFT SSV PROX RFX: 2070 S
RIGHT CFV RFX: 1909 S
RIGHT GSV AK RFX: 2120 S
RIGHT GSV AT KNEE DIAM: 0.28 CM
RIGHT GSV BK MID DIAM: 0.33 CM
RIGHT GSV BK MID RFX: 2098 S
RIGHT GSV JUNC DIAM: 0.7 CM
RIGHT GSV JUNC RFX: 0 S
RIGHT GSV THIGH PROX DIAM: 0.54 CM
RIGHT GSV THIGH PROX RFX: 0 S
RIGHT SSV PROX DIAM: 0.42 CM
RIGHT SSV PROX RFX: 0 S

## 2021-08-19 NOTE — TELEPHONE ENCOUNTER
----- Message from Juanita Kelly MD sent at 8/19/2021  7:38 AM EDT -----  He needs vascular appt to discuss LE venous reflux study results once BARBARA are done. No DVT. Venous reflux noted. Called pt and left message to call me back. Called pt,verified pt with two pt identifiers,advised pt that his venous doppler showed no blood clots but does show venous reflux. Advised once his BARBARA is done he will need an appt to discuss his results further with . he advised he missed his appt today because he overslept. Advised I would have Reatha Goods call him to get his scheduled for his BABRARA and f/u appt. Pt verbalized understanding.

## 2021-08-20 ENCOUNTER — TELEPHONE (OUTPATIENT)
Dept: CARDIOLOGY CLINIC | Age: 76
End: 2021-08-20

## 2021-08-20 NOTE — TELEPHONE ENCOUNTER
Attempted to call patient to schedule a vascular appt with Dr. Reinaldo Willis.  Phone not in service.     Arpan Chacon

## 2021-08-20 NOTE — TELEPHONE ENCOUNTER
Patient has been rescheduled for the BARBARA and also scheduled for a follow up appt with Dr. Martha Sheth to discuss test results.       Thanks,  Jose F Sims

## 2021-08-20 NOTE — TELEPHONE ENCOUNTER
LVM for patient to call to reschedule BARBARA and a f/up with DR. DOS SANTOS to discuss vascular results.   Thanks,  Yoselin Price

## 2021-08-23 NOTE — TELEPHONE ENCOUNTER
Message  Received: Today  Rosalind Goss sent to Joe Ojeda LPN  Caller: Unspecified (4 days ago,  9:08 AM)  scheduled         Noted, scheduled 9/21/21 to see margie borges.

## 2021-08-26 ENCOUNTER — ANCILLARY PROCEDURE (OUTPATIENT)
Dept: CARDIOLOGY CLINIC | Age: 76
End: 2021-08-26
Payer: MEDICARE

## 2021-08-26 VITALS — WEIGHT: 188 LBS | HEIGHT: 72 IN | BODY MASS INDEX: 25.47 KG/M2

## 2021-08-26 DIAGNOSIS — M79.604 PAIN IN BOTH LOWER EXTREMITIES: ICD-10-CM

## 2021-08-26 DIAGNOSIS — I73.9 PAD (PERIPHERAL ARTERY DISEASE) (HCC): ICD-10-CM

## 2021-08-26 DIAGNOSIS — M79.89 LEG SWELLING: ICD-10-CM

## 2021-08-26 DIAGNOSIS — E78.00 HYPERCHOLESTEREMIA: ICD-10-CM

## 2021-08-26 DIAGNOSIS — R09.89 DECREASED PEDAL PULSES: ICD-10-CM

## 2021-08-26 DIAGNOSIS — M79.605 PAIN IN BOTH LOWER EXTREMITIES: ICD-10-CM

## 2021-08-26 PROCEDURE — 93923 UPR/LXTR ART STDY 3+ LVLS: CPT | Performed by: INTERNAL MEDICINE

## 2021-08-28 LAB
IMMEDIATE ARM BP: 126 MMHG
IMMEDIATE LEFT ABI: 1.29
IMMEDIATE LEFT TIBIAL: 162 MMHG
IMMEDIATE RIGHT ABI: 1.14
IMMEDIATE RIGHT TIBIAL: 144 MMHG
LEFT ABI: 1.25
LEFT ARM BP: 122 MMHG
LEFT POSTERIOR TIBIAL: 153 MMHG
LEFT TBI: 0.99
LEFT TOE PRESSURE: 121 MMHG
RIGHT ABI: 1.23
RIGHT ARM BP: 122 MMHG
RIGHT POSTERIOR TIBIAL: 150 MMHG
RIGHT TBI: 1.02
RIGHT TOE PRESSURE: 125 MMHG

## 2021-08-30 ENCOUNTER — TELEPHONE (OUTPATIENT)
Dept: CARDIOLOGY CLINIC | Age: 76
End: 2021-08-30

## 2021-08-30 NOTE — TELEPHONE ENCOUNTER
----- Message from Kimberlee Mensah MD sent at 8/28/2021 12:44 PM EDT -----  Inform him BARBARA is normal.

## 2021-09-15 ENCOUNTER — ANCILLARY PROCEDURE (OUTPATIENT)
Dept: CARDIOLOGY CLINIC | Age: 76
End: 2021-09-15
Payer: MEDICARE

## 2021-09-15 VITALS
WEIGHT: 188 LBS | HEIGHT: 72 IN | BODY MASS INDEX: 25.47 KG/M2 | DIASTOLIC BLOOD PRESSURE: 60 MMHG | SYSTOLIC BLOOD PRESSURE: 110 MMHG

## 2021-09-15 DIAGNOSIS — R09.89 DECREASED PEDAL PULSES: ICD-10-CM

## 2021-09-15 DIAGNOSIS — M79.89 LEG SWELLING: ICD-10-CM

## 2021-09-15 DIAGNOSIS — E78.00 HYPERCHOLESTEREMIA: ICD-10-CM

## 2021-09-15 DIAGNOSIS — M79.604 PAIN IN BOTH LOWER EXTREMITIES: ICD-10-CM

## 2021-09-15 DIAGNOSIS — M79.605 PAIN IN BOTH LOWER EXTREMITIES: ICD-10-CM

## 2021-09-15 PROCEDURE — 93306 TTE W/DOPPLER COMPLETE: CPT | Performed by: INTERNAL MEDICINE

## 2021-09-16 LAB
ECHO AO ASC DIAM: 3.01 CM
ECHO AO ROOT DIAM: 2.95 CM
ECHO AV AREA PEAK VELOCITY: 2.17 CM2
ECHO AV AREA/BSA PEAK VELOCITY: 1 CM2/M2
ECHO AV PEAK GRADIENT: 7.11 MMHG
ECHO AV PEAK VELOCITY: 133.29 CM/S
ECHO LA AREA 4C: 24.85 CM2
ECHO LA MAJOR AXIS: 3.61 CM
ECHO LA MINOR AXIS: 1.74 CM
ECHO LA VOL 2C: 71.36 ML (ref 18–58)
ECHO LA VOL 4C: 76.7 ML (ref 18–58)
ECHO LA VOL BP: 83.92 ML (ref 18–58)
ECHO LA VOL/BSA BIPLANE: 40.35 ML/M2 (ref 16–28)
ECHO LA VOLUME INDEX A2C: 34.31 ML/M2 (ref 16–28)
ECHO LA VOLUME INDEX A4C: 36.88 ML/M2 (ref 16–28)
ECHO LV E' LATERAL VELOCITY: 14.56 CM/S
ECHO LV E' SEPTAL VELOCITY: 10.01 CM/S
ECHO LV INTERNAL DIMENSION DIASTOLIC: 4.24 CM (ref 4.2–5.9)
ECHO LV INTERNAL DIMENSION SYSTOLIC: 2.96 CM
ECHO LV IVSD: 1.1 CM (ref 0.6–1)
ECHO LV MASS 2D: 158.4 G (ref 88–224)
ECHO LV MASS INDEX 2D: 76.1 G/M2 (ref 49–115)
ECHO LV POSTERIOR WALL DIASTOLIC: 1.09 CM (ref 0.6–1)
ECHO LVOT DIAM: 1.98 CM
ECHO LVOT PEAK GRADIENT: 3.51 MMHG
ECHO LVOT PEAK VELOCITY: 93.62 CM/S
ECHO LVOT SV: 55.9 ML
ECHO LVOT VTI: 18.08 CM
ECHO MV A VELOCITY: 69.74 CM/S
ECHO MV E DECELERATION TIME (DT): 267.19 MS
ECHO MV E VELOCITY: 70.01 CM/S
ECHO MV E/A RATIO: 1
ECHO MV E/E' LATERAL: 4.81
ECHO MV E/E' RATIO (AVERAGED): 5.9
ECHO MV E/E' SEPTAL: 6.99
ECHO RA AREA 4C: 21.91 CM2
ECHO RV TAPSE: 2.45 CM (ref 1.5–2)
LA VOL DISK BP: 76.11 ML (ref 18–58)
LVOT MG: 1.66 MMHG

## 2021-09-20 ENCOUNTER — TELEPHONE (OUTPATIENT)
Dept: CARDIOLOGY CLINIC | Age: 76
End: 2021-09-20

## 2021-09-20 NOTE — TELEPHONE ENCOUNTER
Verified patient with two patient identifiers. Spoke with patient. Discussed echo results as noted below    Patient verbalized understanding.

## 2021-09-21 ENCOUNTER — OFFICE VISIT (OUTPATIENT)
Dept: CARDIOLOGY CLINIC | Age: 76
End: 2021-09-21
Payer: MEDICARE

## 2021-09-21 VITALS
RESPIRATION RATE: 18 BRPM | OXYGEN SATURATION: 98 % | HEIGHT: 73 IN | SYSTOLIC BLOOD PRESSURE: 130 MMHG | DIASTOLIC BLOOD PRESSURE: 90 MMHG | WEIGHT: 189.6 LBS | HEART RATE: 69 BPM | BODY MASS INDEX: 25.13 KG/M2

## 2021-09-21 DIAGNOSIS — I87.2 VENOUS INSUFFICIENCY OF BOTH LOWER EXTREMITIES: Primary | ICD-10-CM

## 2021-09-21 DIAGNOSIS — I83.12 VARICOSE VEINS OF BOTH LOWER EXTREMITIES WITH INFLAMMATION: ICD-10-CM

## 2021-09-21 DIAGNOSIS — E78.00 HYPERCHOLESTEREMIA: ICD-10-CM

## 2021-09-21 DIAGNOSIS — I83.11 VARICOSE VEINS OF BOTH LOWER EXTREMITIES WITH INFLAMMATION: ICD-10-CM

## 2021-09-21 DIAGNOSIS — I10 ESSENTIAL HYPERTENSION: ICD-10-CM

## 2021-09-21 DIAGNOSIS — M79.89 LEG SWELLING: ICD-10-CM

## 2021-09-21 PROCEDURE — G8536 NO DOC ELDER MAL SCRN: HCPCS | Performed by: INTERNAL MEDICINE

## 2021-09-21 PROCEDURE — G8510 SCR DEP NEG, NO PLAN REQD: HCPCS | Performed by: INTERNAL MEDICINE

## 2021-09-21 PROCEDURE — G0463 HOSPITAL OUTPT CLINIC VISIT: HCPCS | Performed by: INTERNAL MEDICINE

## 2021-09-21 PROCEDURE — G8752 SYS BP LESS 140: HCPCS | Performed by: INTERNAL MEDICINE

## 2021-09-21 PROCEDURE — G8755 DIAS BP > OR = 90: HCPCS | Performed by: INTERNAL MEDICINE

## 2021-09-21 PROCEDURE — 3017F COLORECTAL CA SCREEN DOC REV: CPT | Performed by: INTERNAL MEDICINE

## 2021-09-21 PROCEDURE — 1101F PT FALLS ASSESS-DOCD LE1/YR: CPT | Performed by: INTERNAL MEDICINE

## 2021-09-21 PROCEDURE — G8427 DOCREV CUR MEDS BY ELIG CLIN: HCPCS | Performed by: INTERNAL MEDICINE

## 2021-09-21 PROCEDURE — 99214 OFFICE O/P EST MOD 30 MIN: CPT | Performed by: INTERNAL MEDICINE

## 2021-09-21 PROCEDURE — G8419 CALC BMI OUT NRM PARAM NOF/U: HCPCS | Performed by: INTERNAL MEDICINE

## 2021-09-21 NOTE — PROGRESS NOTES
1. Have you been to the ER, urgent care clinic since your last visit? Hospitalized since your last visit? No    2. Have you seen or consulted any other health care providers outside of the [address] [place] [address] since your last visit? Include any pap smears or colon screening.  No       Chief Complaint   Patient presents with    Results     Discuss BARBARA results

## 2021-09-21 NOTE — PROGRESS NOTES
9/21/2021 10:19 AM      Subjective:     Roney Mckinnon is seen in vascular clinic for f/u visit and to discuss LE non invasive vascular test results. Continues to c/o LE swelling and pain. He denies chest pain, chest pressure/discomfort, dyspnea, palpitations, irregular heart beats, near-syncope, syncope, fatigue, orthopnea, paroxysmal nocturnal dyspnea, exertional chest pressure/discomfort. Visit Vitals  BP (!) 130/90 (BP 1 Location: Right arm, BP Patient Position: Sitting, BP Cuff Size: Adult)   Pulse 69   Resp 18   Ht 6' 1\" (1.854 m)   Wt 189 lb 9.6 oz (86 kg)   SpO2 98%   BMI 25.01 kg/m²       Current Outpatient Medications   Medication Sig    torsemide (DEMADEX) 20 mg tablet Take 3 Tablets by mouth daily.  potassium chloride (K-DUR, KLOR-CON) 20 mEq tablet Take 1 Tablet by mouth two (2) times a day. Indications: prevention of low potassium in the blood    cholecalciferol (Vitamin D3) 25 mcg (1,000 unit) cap One tab daily (Patient taking differently: Take 400 Units/day by mouth. One tab daily)    triamcinolone acetonide (KENALOG) 0.1 % topical cream Apply  to affected area two (2) times a day. use thin layer    cyanocobalamin (Vitamin B-12) 1,000 mcg tablet Take 1 Tab by mouth daily.  rosuvastatin (CRESTOR) 5 mg tablet Take 1 Tab by mouth nightly.  fluticasone propionate (FLONASE) 50 mcg/actuation nasal spray 1 [place] by Both Nostrils route daily. Indications: inflammation of the nose due to an allergy (Patient taking differently: 1 Spray by Both Nostrils route as needed. Indications: inflammation of the nose due to an allergy)    aspirin delayed-release 81 mg tablet Take  by mouth daily. No current facility-administered medications for this visit.          Objective:      Visit Vitals  BP (!) 130/90 (BP 1 Location: Right arm, BP Patient Position: Sitting, BP Cuff Size: Adult)   Pulse 69   Resp 18   Ht 6' 1\" (1.854 m)   Wt 189 lb 9.6 oz (86 kg)   SpO2 98%   BMI 25.01 kg/m²       Past Medical History:   Diagnosis Date    Elevated PSA, less than 10 ng/ml 12/10/2012    Hyperlipidemia     Hypertension     Hypokalemia 7/25/2014    Localized edema 2/7/2017    Long term current use of anticoagulant therapy     Patient is full code 9/18/2018    Syncope     Vitamin B12 deficiency neuropathy ([address] 6/18/2019    Vitamin D deficiency 7/8/2014    WBC decreased 7/6/2012      Past Surgical History:   Procedure Laterality Date    COLONOSCOPY,DIAGNOSTIC  2/5/2015         HX COLONOSCOPY      HX ORTHOPAEDIC      left knee surg in  1984     No Known Allergies   Family History   Problem Relation Age of Onset    Kidney Disease Father     Hypertension Mother       Social History     Socioeconomic History    Marital status: SINGLE     Spouse name: Not on file    Number of children: Not on file    Years of education: Not on file    Highest education level: Not on file   Occupational History    Not on file   Tobacco Use    Smoking status: Never Smoker    Smokeless tobacco: Never Used   Vaping Use    Vaping Use: Never used   Substance and Sexual Activity    Alcohol use: No    Drug use: No    Sexual activity: Not Currently     Comment: single   Other Topics Concern    Not on file   Social History Narrative    Not on file     Social Determinants of Health     Financial Resource Strain:     Difficulty of Paying Living Expenses:    Food Insecurity:     Worried About Running Out of Food in the Last Year:     Ran Out of Food in the Last Year:    Transportation Needs:     Lack of Transportation (Medical):      Lack of Transportation (Non-Medical):    Physical Activity:     Days of Exercise per Week:     Minutes of Exercise per Session:    Stress:     Feeling of Stress :    Social Connections:     Frequency of Communication with Friends and Family:     Frequency of Social Gatherings with Friends and Family:     Attends Rastafari Services:     Active Member of Clubs or Organizations:     Attends Club or Organization Meetings:     Marital Status:    Intimate Partner Violence:     Fear of Current or Ex-Partner:     Emotionally Abused:     Physically Abused:     Sexually Abused:             PHYSICIAN TO COMPLETE BELOW THIS POINT    Date of Initial Physician Evaluation:___________8/10/2021____________  Check all that apply:  [x] Reviewed Venous history  [x] Physical examination of the affected leg(s)   [x] Duplex or Doppler Scan results reviewed. Duplex or Doppler Ultrasound of the venous system demonstrate:  [x] Absence of deep venous thrombosis  [x] Greater and/or lesser saphenous vein or  valvular incompetence/reflux that correlates with        patients symptoms  []   valvular incompetence/reflux that correlates with patients symptoms    [x] Graduated, elasticized compression stockings (20-30 mmHg), has been prescribed. [x] Standing Photos taken of leg(s)  [] Front     [] Back     [x] Front and back   [] Other causes of patients leg(s) symptoms have been ruled out             Assessment:       ICD-10-CM ICD-9-CM    1. Venous insufficiency of both lower extremities  I87.2 459.81    2. Leg swelling  M79.89 729.81    3. Hypercholesteremia  E78.00 272.0    4. Essential hypertension  I10 401.9    5. Varicose veins of both lower extremities with inflammation  I83.11 454.1     I83.12         CEAP class:      []C1   []C2   []C3    []C4    []4a    [x]4b   []C5   []C6           Plan:     1. LE venous insuff, leg swelling, varicose veins: results reviewed. F/u in 3 months. Normal BARBARA. Echo reviewed. 1.  - Instruction given on daily leg elevation   2.  - Instruction given for mild exercise  3.  - Instruction given for weight reduction    2. On statin. 3. BP controlled.      Anson Carrillo MD  9/21/2021

## 2021-10-19 ENCOUNTER — OFFICE VISIT (OUTPATIENT)
Dept: FAMILY MEDICINE CLINIC | Age: 76
End: 2021-10-19
Payer: MEDICARE

## 2021-10-19 VITALS
SYSTOLIC BLOOD PRESSURE: 134 MMHG | WEIGHT: 183.7 LBS | OXYGEN SATURATION: 98 % | RESPIRATION RATE: 16 BRPM | HEART RATE: 85 BPM | TEMPERATURE: 98.3 F | BODY MASS INDEX: 24.34 KG/M2 | HEIGHT: 73 IN | DIASTOLIC BLOOD PRESSURE: 83 MMHG

## 2021-10-19 DIAGNOSIS — Z71.89 ADVICE GIVEN ABOUT COVID-19 VIRUS INFECTION: Primary | ICD-10-CM

## 2021-10-19 DIAGNOSIS — I10 HYPERTENSION, UNSPECIFIED TYPE: ICD-10-CM

## 2021-10-19 DIAGNOSIS — N42.9 DISORDER OF PROSTATE, UNSPECIFIED: ICD-10-CM

## 2021-10-19 DIAGNOSIS — R79.89 OTHER SPECIFIED ABNORMAL FINDINGS OF BLOOD CHEMISTRY: ICD-10-CM

## 2021-10-19 DIAGNOSIS — E53.8 VITAMIN B12 DEFICIENCY NEUROPATHY (HCC): ICD-10-CM

## 2021-10-19 DIAGNOSIS — R60.0 LOCALIZED EDEMA: ICD-10-CM

## 2021-10-19 DIAGNOSIS — G63 VITAMIN B12 DEFICIENCY NEUROPATHY (HCC): ICD-10-CM

## 2021-10-19 LAB
ALBUMIN SERPL-MCNC: 3.7 G/DL (ref 3.5–5)
ALBUMIN/GLOB SERPL: 1.1 {RATIO} (ref 1.1–2.2)
ALP SERPL-CCNC: 91 U/L (ref 45–117)
ALT SERPL-CCNC: 18 U/L (ref 12–78)
ANION GAP SERPL CALC-SCNC: 3 MMOL/L (ref 5–15)
AST SERPL-CCNC: 23 U/L (ref 15–37)
BASOPHILS # BLD: 0 K/UL (ref 0–0.1)
BASOPHILS NFR BLD: 1 % (ref 0–1)
BILIRUB SERPL-MCNC: 0.5 MG/DL (ref 0.2–1)
BUN SERPL-MCNC: 12 MG/DL (ref 6–20)
BUN/CREAT SERPL: 9 (ref 12–20)
CALCIUM SERPL-MCNC: 9.3 MG/DL (ref 8.5–10.1)
CHLORIDE SERPL-SCNC: 104 MMOL/L (ref 97–108)
CHOLEST SERPL-MCNC: 143 MG/DL
CO2 SERPL-SCNC: 32 MMOL/L (ref 21–32)
CREAT SERPL-MCNC: 1.32 MG/DL (ref 0.7–1.3)
DIFFERENTIAL METHOD BLD: ABNORMAL
EOSINOPHIL # BLD: 0.2 K/UL (ref 0–0.4)
EOSINOPHIL NFR BLD: 4 % (ref 0–7)
ERYTHROCYTE [DISTWIDTH] IN BLOOD BY AUTOMATED COUNT: 13.4 % (ref 11.5–14.5)
EST. AVERAGE GLUCOSE BLD GHB EST-MCNC: 120 MG/DL
FOLATE SERPL-MCNC: 12 NG/ML (ref 5–21)
GLOBULIN SER CALC-MCNC: 3.5 G/DL (ref 2–4)
GLUCOSE SERPL-MCNC: 108 MG/DL (ref 65–100)
HBA1C MFR BLD: 5.8 % (ref 4–5.6)
HCT VFR BLD AUTO: 48.4 % (ref 36.6–50.3)
HDLC SERPL-MCNC: 66 MG/DL
HDLC SERPL: 2.2 {RATIO} (ref 0–5)
HGB BLD-MCNC: 14.7 G/DL (ref 12.1–17)
IMM GRANULOCYTES # BLD AUTO: 0 K/UL (ref 0–0.04)
IMM GRANULOCYTES NFR BLD AUTO: 0 % (ref 0–0.5)
LDLC SERPL CALC-MCNC: 66.6 MG/DL (ref 0–100)
LYMPHOCYTES # BLD: 1 K/UL (ref 0.8–3.5)
LYMPHOCYTES NFR BLD: 29 % (ref 12–49)
MCH RBC QN AUTO: 27.7 PG (ref 26–34)
MCHC RBC AUTO-ENTMCNC: 30.4 G/DL (ref 30–36.5)
MCV RBC AUTO: 91.1 FL (ref 80–99)
MONOCYTES # BLD: 0.4 K/UL (ref 0–1)
MONOCYTES NFR BLD: 12 % (ref 5–13)
NEUTS SEG # BLD: 1.8 K/UL (ref 1.8–8)
NEUTS SEG NFR BLD: 54 % (ref 32–75)
NRBC # BLD: 0 K/UL (ref 0–0.01)
NRBC BLD-RTO: 0 PER 100 WBC
PLATELET # BLD AUTO: 131 K/UL (ref 150–400)
PMV BLD AUTO: 11.5 FL (ref 8.9–12.9)
POTASSIUM SERPL-SCNC: 3.8 MMOL/L (ref 3.5–5.1)
PROT SERPL-MCNC: 7.2 G/DL (ref 6.4–8.2)
PSA SERPL-MCNC: 3.7 NG/ML (ref 0.01–4)
RBC # BLD AUTO: 5.31 M/UL (ref 4.1–5.7)
SODIUM SERPL-SCNC: 139 MMOL/L (ref 136–145)
T4 FREE SERPL-MCNC: 1.2 NG/DL (ref 0.8–1.5)
TRIGL SERPL-MCNC: 52 MG/DL (ref ?–150)
TSH SERPL DL<=0.05 MIU/L-ACNC: 1.62 UIU/ML (ref 0.36–3.74)
VIT B12 SERPL-MCNC: 355 PG/ML (ref 193–986)
VLDLC SERPL CALC-MCNC: 10.4 MG/DL
WBC # BLD AUTO: 3.4 K/UL (ref 4.1–11.1)

## 2021-10-19 PROCEDURE — 99213 OFFICE O/P EST LOW 20 MIN: CPT | Performed by: FAMILY MEDICINE

## 2021-10-19 PROCEDURE — G8427 DOCREV CUR MEDS BY ELIG CLIN: HCPCS | Performed by: FAMILY MEDICINE

## 2021-10-19 PROCEDURE — G8536 NO DOC ELDER MAL SCRN: HCPCS | Performed by: FAMILY MEDICINE

## 2021-10-19 PROCEDURE — G8432 DEP SCR NOT DOC, RNG: HCPCS | Performed by: FAMILY MEDICINE

## 2021-10-19 PROCEDURE — G8752 SYS BP LESS 140: HCPCS | Performed by: FAMILY MEDICINE

## 2021-10-19 PROCEDURE — 1101F PT FALLS ASSESS-DOCD LE1/YR: CPT | Performed by: FAMILY MEDICINE

## 2021-10-19 PROCEDURE — G8754 DIAS BP LESS 90: HCPCS | Performed by: FAMILY MEDICINE

## 2021-10-19 PROCEDURE — 3017F COLORECTAL CA SCREEN DOC REV: CPT | Performed by: FAMILY MEDICINE

## 2021-10-19 PROCEDURE — G0463 HOSPITAL OUTPT CLINIC VISIT: HCPCS | Performed by: FAMILY MEDICINE

## 2021-10-19 PROCEDURE — G8420 CALC BMI NORM PARAMETERS: HCPCS | Performed by: FAMILY MEDICINE

## 2021-10-19 RX ORDER — TORSEMIDE 20 MG/1
60 TABLET ORAL DAILY
Qty: 270 TABLET | Refills: 1 | Status: SHIPPED | OUTPATIENT
Start: 2021-10-19 | End: 2022-01-18 | Stop reason: SDUPTHER

## 2021-10-19 RX ORDER — POTASSIUM CHLORIDE 20 MEQ/1
20 TABLET, EXTENDED RELEASE ORAL 2 TIMES DAILY
Qty: 180 TABLET | Refills: 1 | Status: SHIPPED | OUTPATIENT
Start: 2021-10-19 | End: 2022-01-18 | Stop reason: SDUPTHER

## 2021-10-19 NOTE — PATIENT INSTRUCTIONS

## 2021-10-19 NOTE — PROGRESS NOTES
HISTORY OF PRESENT ILLNESS  Jessica Albarran is a 78 y.o. male. Today's Covid vaccinated Pt main concerns were provided in the clinic,     pt has no fever no cough no dyspnea, no ha, not dizzy, nl smell nl taste, no N/V/D, has no orbital pain,  no body ache. Today pt also present for Bp check, for which the pt has been a compliant w/ the bp meds, in addition pt trying to the best to have a low salt diet and to be as active as possible,   pt sometimes obtain the bp with the average of  <140/90,  At this time, pt denies  Having Chest Pain, has improved b/l legs swelling and not feeling lightheadedness,     183 lb 11.2 oz (83.3 kg)  as of 10/19/2021 183 lb 11.2 oz (83.3 kg) 189 lb 9.6 oz (86 kg) 188 lb (85.3 kg) 188 lb (85.3 kg) 188 lb 11.2 oz (85.6 kg) 199     Feeling better         Current Outpatient Medications   Medication Sig Dispense Refill    torsemide (DEMADEX) 20 mg tablet Take 3 Tablets by mouth daily. 270 Tablet 1    potassium chloride (K-DUR, KLOR-CON) 20 mEq tablet Take 1 Tablet by mouth two (2) times a day. Indications: prevention of low potassium in the blood 180 Tablet 1    cholecalciferol (Vitamin D3) 25 mcg (1,000 unit) cap One tab daily (Patient taking differently: Take 400 Units/day by mouth. One tab daily) 90 Capsule 3    triamcinolone acetonide (KENALOG) 0.1 % topical cream Apply  to affected area two (2) times a day. use thin layer 80 g 1    cyanocobalamin (Vitamin B-12) 1,000 mcg tablet Take 1 Tab by mouth daily. 30 Tab 6    rosuvastatin (CRESTOR) 5 mg tablet Take 1 Tab by mouth nightly. 30 Tab 6    fluticasone propionate (FLONASE) 50 mcg/actuation nasal spray 1 [place] by Both Nostrils route daily. Indications: inflammation of the nose due to an allergy (Patient taking differently: 1 Spray by Both Nostrils route as needed. Indications: inflammation of the nose due to an allergy) 1 Bottle 5    aspirin delayed-release 81 mg tablet Take  by mouth daily.        No Known Allergies  Past Medical History:   Diagnosis Date    Elevated PSA, less than 10 ng/ml 12/10/2012    Hyperlipidemia     Hypertension     Hypokalemia 7/25/2014    Localized edema 2/7/2017    Long term current use of anticoagulant therapy     Patient is full code 9/18/2018    Syncope     Vitamin B12 deficiency neuropathy ([address] 6/18/2019    Vitamin D deficiency 7/8/2014    WBC decreased 7/6/2012     Past Surgical History:   Procedure Laterality Date    COLONOSCOPY,DIAGNOSTIC  2/5/2015         HX COLONOSCOPY      HX ORTHOPAEDIC      left knee surg in  1984     Family History   Problem Relation Age of Onset    Kidney Disease Father     Hypertension Mother      Social History     Tobacco Use    Smoking status: Never Smoker    Smokeless tobacco: Never Used   Substance Use Topics    Alcohol use: No      Lab Results   Component Value Date/Time    WBC 3.8 01/25/2021 11:15 AM    HGB 13.6 01/25/2021 11:15 AM    HCT 40.2 01/25/2021 11:15 AM    PLATELET 178 76/08/9182 11:15 AM    MCV 85 01/25/2021 11:15 AM     Lab Results   Component Value Date/Time    Hemoglobin A1c 6.5 (H) 01/13/2016 09:03 AM    Hemoglobin A1c 6.3 (H) 07/13/2015 09:18 AM    Hemoglobin A1c 6.4 (H) 01/26/2015 10:21 AM    Glucose 108 (H) 07/19/2021 04:06 PM    LDL, calculated 62 01/25/2021 11:15 AM    LDL, calculated 86 09/18/2019 12:55 PM    Creatinine 1.33 (H) 07/19/2021 04:06 PM      Lab Results   Component Value Date/Time    Cholesterol, total 119 01/25/2021 11:15 AM    HDL Cholesterol 43 01/25/2021 11:15 AM    LDL, calculated 62 01/25/2021 11:15 AM    LDL, calculated 86 09/18/2019 12:55 PM    Triglyceride 65 01/25/2021 11:15 AM     Lab Results   Component Value Date/Time    GFR est non-AA 53 (L) 07/19/2021 04:06 PM    GFR est AA >60 07/19/2021 04:06 PM    Creatinine 1.33 (H) 07/19/2021 04:06 PM    BUN 10 07/19/2021 04:06 PM    Sodium 142 07/19/2021 04:06 PM    Potassium 4.3 07/19/2021 04:06 PM    Chloride 107 07/19/2021 04:06 PM    CO2 32 07/19/2021 04:06 PM    Magnesium 2.1 01/13/2016 09:03 AM        Review of Systems   Constitutional: Negative for chills, fever and malaise/fatigue. HENT: Negative for nosebleeds. Eyes: Negative for pain. Respiratory: Negative for cough and wheezing. Cardiovascular: Positive for leg swelling. Negative for chest pain. Gastrointestinal: Negative for constipation, diarrhea and nausea. Genitourinary: Negative for frequency. Musculoskeletal: Negative for joint pain and myalgias. Skin: Negative for rash. Neurological: Negative for loss of consciousness and headaches. Endo/Heme/Allergies: Does not bruise/bleed easily. Psychiatric/Behavioral: Negative for depression. The patient is not nervous/anxious and does not have insomnia. All other systems reviewed and are negative. Physical Exam  Vitals and nursing note reviewed. Constitutional:       Appearance: He is well-developed. HENT:      Head: Normocephalic and atraumatic. Mouth/Throat:      Pharynx: No oropharyngeal exudate. Eyes:      Conjunctiva/sclera: Conjunctivae normal.      Pupils: Pupils are equal, round, and reactive to light. Neck:      Thyroid: No thyromegaly. Vascular: No JVD. Cardiovascular:      Rate and Rhythm: Normal rate and regular rhythm. Heart sounds: Normal heart sounds. No murmur heard. No friction rub. Pulmonary:      Effort: Pulmonary effort is normal. No respiratory distress. Breath sounds: Normal breath sounds. No wheezing or rales. Abdominal:      General: Bowel sounds are normal. There is no distension. Palpations: Abdomen is soft. Tenderness: There is no abdominal tenderness. Musculoskeletal:         General: No tenderness. Cervical back: Normal range of motion and neck supple. Right lower leg: Edema present. Left lower leg: Edema present. Lymphadenopathy:      Cervical: No cervical adenopathy. Skin:     General: Skin is warm. Findings: No erythema or rash. Neurological:      Mental Status: He is alert and oriented to person, place, and time. Deep Tendon Reflexes: Reflexes are normal and symmetric. Psychiatric:         Behavior: Behavior normal.         ASSESSMENT and PLAN  Diagnoses and all orders for this visit:    1. Advice given about COVID-19 virus infection    2. Localized edema  -     torsemide (DEMADEX) 20 mg tablet; Take 3 Tablets by mouth daily. -     potassium chloride (K-DUR, KLOR-CON) 20 mEq tablet; Take 1 Tablet by mouth two (2) times a day. Indications: prevention of low potassium in the blood  -     CBC WITH AUTOMATED DIFF; Future  -     LIPID PANEL; Future  -     HEMOGLOBIN A1C WITH EAG; Future  -     TSH 3RD GENERATION; Future  -     T4, FREE; Future  -     METABOLIC PANEL, COMPREHENSIVE; Future  -     VITAMIN B12 & FOLATE; Future  -     PSA, DIAGNOSTIC (PROSTATE SPECIFIC AG); Future    3. Hypertension, unspecified type  -     torsemide (DEMADEX) 20 mg tablet; Take 3 Tablets by mouth daily. -     potassium chloride (K-DUR, KLOR-CON) 20 mEq tablet; Take 1 Tablet by mouth two (2) times a day. Indications: prevention of low potassium in the blood  -     CBC WITH AUTOMATED DIFF; Future  -     LIPID PANEL; Future  -     HEMOGLOBIN A1C WITH EAG; Future  -     TSH 3RD GENERATION; Future  -     T4, FREE; Future  -     METABOLIC PANEL, COMPREHENSIVE; Future  -     VITAMIN B12 & FOLATE; Future  -     PSA, DIAGNOSTIC (PROSTATE SPECIFIC AG); Future    4. Vitamin B12 deficiency neuropathy (HCC)  -     torsemide (DEMADEX) 20 mg tablet; Take 3 Tablets by mouth daily. -     potassium chloride (K-DUR, KLOR-CON) 20 mEq tablet; Take 1 Tablet by mouth two (2) times a day. Indications: prevention of low potassium in the blood  -     CBC WITH AUTOMATED DIFF; Future  -     LIPID PANEL; Future  -     HEMOGLOBIN A1C WITH EAG; Future  -     TSH 3RD GENERATION; Future  -     T4, FREE; Future  -     METABOLIC PANEL, COMPREHENSIVE;  Future  -     VITAMIN B12 & FOLATE; Future  -     PSA, DIAGNOSTIC (PROSTATE SPECIFIC AG); Future    5. Other specified abnormal findings of blood chemistry   -     HEMOGLOBIN A1C WITH EAG; Future    6. Disorder of prostate, unspecified   -     PSA, DIAGNOSTIC (PROSTATE SPECIFIC AG);  Future           Concern abdout COVID-19 addressed and detailed, pt was told that the best way to prevent illness is by protection with vaccination, and to Wear a facemask , having social distance, and to get tested if possible,   Pt was also told if develop dyspnea needs to call 911 or go to er, call for furhter advise, pt agreed with todays recommendations,

## 2021-10-19 NOTE — PROGRESS NOTES
Name and  Verified. Pharmacy verified    Chief Complaint   Patient presents with    Follow-up     Hypercholesterolemia         1. Have you been to the ER, urgent care clinic since your last visit? Hospitalized since your last visit? No    2. Have you seen or consulted any other health care providers outside of the [address] [place] [address] since your last visit? Include any pap smears or colon screening.      Yes  Cardiology  2021   Venous insufficiency of both lower extremities       Health Maintenance Due   Topic Date Due    Shingrix Vaccine Age 49> (1 of 2) Never done    A1C test (Diabetic or Prediabetic)  2017    Colorectal Cancer Screening Combo  2020    Flu Vaccine (1) 2021

## 2022-01-18 ENCOUNTER — OFFICE VISIT (OUTPATIENT)
Dept: FAMILY MEDICINE CLINIC | Age: 77
End: 2022-01-18

## 2022-01-18 VITALS
SYSTOLIC BLOOD PRESSURE: 140 MMHG | OXYGEN SATURATION: 97 % | RESPIRATION RATE: 16 BRPM | BODY MASS INDEX: 24.91 KG/M2 | HEART RATE: 96 BPM | DIASTOLIC BLOOD PRESSURE: 81 MMHG | WEIGHT: 188.8 LBS

## 2022-01-18 DIAGNOSIS — R53.83 OTHER FATIGUE: ICD-10-CM

## 2022-01-18 DIAGNOSIS — R60.0 LOCALIZED EDEMA: ICD-10-CM

## 2022-01-18 DIAGNOSIS — I10 HYPERTENSION, UNSPECIFIED TYPE: ICD-10-CM

## 2022-01-18 DIAGNOSIS — N42.9 DISORDER OF PROSTATE, UNSPECIFIED: ICD-10-CM

## 2022-01-18 DIAGNOSIS — R97.20 ELEVATED PSA MEASUREMENT: ICD-10-CM

## 2022-01-18 DIAGNOSIS — E78.00 HYPERCHOLESTEROLEMIA: ICD-10-CM

## 2022-01-18 DIAGNOSIS — G63 VITAMIN B12 DEFICIENCY NEUROPATHY (HCC): ICD-10-CM

## 2022-01-18 DIAGNOSIS — Z12.11 SCREEN FOR COLON CANCER: ICD-10-CM

## 2022-01-18 DIAGNOSIS — Z71.89 ADVANCED DIRECTIVES, COUNSELING/DISCUSSION: ICD-10-CM

## 2022-01-18 DIAGNOSIS — Z00.00 MEDICARE ANNUAL WELLNESS VISIT, SUBSEQUENT: Primary | ICD-10-CM

## 2022-01-18 DIAGNOSIS — D69.6 PLATELETS DECREASED (HCC): ICD-10-CM

## 2022-01-18 DIAGNOSIS — R73.03 PRE-DIABETES: ICD-10-CM

## 2022-01-18 DIAGNOSIS — E53.8 VITAMIN B12 DEFICIENCY NEUROPATHY (HCC): ICD-10-CM

## 2022-01-18 DIAGNOSIS — Z00.00 LABORATORY EXAM ORDERED AS PART OF ROUTINE GENERAL MEDICAL EXAMINATION: ICD-10-CM

## 2022-01-18 LAB
ALBUMIN SERPL-MCNC: 3.8 G/DL (ref 3.5–5)
ALBUMIN/GLOB SERPL: 1.2 {RATIO} (ref 1.1–2.2)
ALP SERPL-CCNC: 97 U/L (ref 45–117)
ALT SERPL-CCNC: 19 U/L (ref 12–78)
ANION GAP SERPL CALC-SCNC: 3 MMOL/L (ref 5–15)
APPEARANCE UR: CLEAR
AST SERPL-CCNC: 18 U/L (ref 15–37)
BACTERIA URNS QL MICRO: NEGATIVE /HPF
BASOPHILS # BLD: 0 K/UL (ref 0–0.1)
BASOPHILS NFR BLD: 1 % (ref 0–1)
BILIRUB SERPL-MCNC: 0.5 MG/DL (ref 0.2–1)
BILIRUB UR QL: NEGATIVE
BUN SERPL-MCNC: 9 MG/DL (ref 6–20)
BUN/CREAT SERPL: 9 (ref 12–20)
CALCIUM SERPL-MCNC: 9.1 MG/DL (ref 8.5–10.1)
CHLORIDE SERPL-SCNC: 105 MMOL/L (ref 97–108)
CHOLEST SERPL-MCNC: 128 MG/DL
CO2 SERPL-SCNC: 32 MMOL/L (ref 21–32)
COLOR UR: ABNORMAL
CREAT SERPL-MCNC: 1.03 MG/DL (ref 0.7–1.3)
DIFFERENTIAL METHOD BLD: ABNORMAL
EOSINOPHIL # BLD: 0.1 K/UL (ref 0–0.4)
EOSINOPHIL NFR BLD: 1 % (ref 0–7)
EPITH CASTS URNS QL MICRO: ABNORMAL /LPF
ERYTHROCYTE [DISTWIDTH] IN BLOOD BY AUTOMATED COUNT: 13.9 % (ref 11.5–14.5)
EST. AVERAGE GLUCOSE BLD GHB EST-MCNC: 126 MG/DL
GLOBULIN SER CALC-MCNC: 3.3 G/DL (ref 2–4)
GLUCOSE SERPL-MCNC: 95 MG/DL (ref 65–100)
GLUCOSE UR STRIP.AUTO-MCNC: NEGATIVE MG/DL
HBA1C MFR BLD: 6 % (ref 4–5.6)
HCT VFR BLD AUTO: 47.2 % (ref 36.6–50.3)
HDLC SERPL-MCNC: 71 MG/DL
HDLC SERPL: 1.8 {RATIO} (ref 0–5)
HGB BLD-MCNC: 14.5 G/DL (ref 12.1–17)
HGB UR QL STRIP: ABNORMAL
HYALINE CASTS URNS QL MICRO: ABNORMAL /LPF (ref 0–5)
IMM GRANULOCYTES # BLD AUTO: 0 K/UL (ref 0–0.04)
IMM GRANULOCYTES NFR BLD AUTO: 0 % (ref 0–0.5)
KETONES UR QL STRIP.AUTO: NEGATIVE MG/DL
LDLC SERPL CALC-MCNC: 47.8 MG/DL (ref 0–100)
LEUKOCYTE ESTERASE UR QL STRIP.AUTO: NEGATIVE
LYMPHOCYTES # BLD: 0.8 K/UL (ref 0.8–3.5)
LYMPHOCYTES NFR BLD: 22 % (ref 12–49)
MCH RBC QN AUTO: 27.4 PG (ref 26–34)
MCHC RBC AUTO-ENTMCNC: 30.7 G/DL (ref 30–36.5)
MCV RBC AUTO: 89.2 FL (ref 80–99)
MONOCYTES # BLD: 0.3 K/UL (ref 0–1)
MONOCYTES NFR BLD: 9 % (ref 5–13)
NEUTS SEG # BLD: 2.5 K/UL (ref 1.8–8)
NEUTS SEG NFR BLD: 67 % (ref 32–75)
NITRITE UR QL STRIP.AUTO: NEGATIVE
NRBC # BLD: 0 K/UL (ref 0–0.01)
NRBC BLD-RTO: 0 PER 100 WBC
PH UR STRIP: 7.5 [PH] (ref 5–8)
PLATELET # BLD AUTO: 124 K/UL (ref 150–400)
PMV BLD AUTO: 11.9 FL (ref 8.9–12.9)
POTASSIUM SERPL-SCNC: 3.8 MMOL/L (ref 3.5–5.1)
PROT SERPL-MCNC: 7.1 G/DL (ref 6.4–8.2)
PROT UR STRIP-MCNC: ABNORMAL MG/DL
PSA SERPL-MCNC: 6.3 NG/ML (ref 0.01–4)
RBC # BLD AUTO: 5.29 M/UL (ref 4.1–5.7)
RBC #/AREA URNS HPF: ABNORMAL /HPF (ref 0–5)
SODIUM SERPL-SCNC: 140 MMOL/L (ref 136–145)
SP GR UR REFRACTOMETRY: 1.01 (ref 1–1.03)
T4 FREE SERPL-MCNC: 1 NG/DL (ref 0.8–1.5)
TRIGL SERPL-MCNC: 46 MG/DL (ref ?–150)
TSH SERPL DL<=0.05 MIU/L-ACNC: 2.13 UIU/ML (ref 0.36–3.74)
UROBILINOGEN UR QL STRIP.AUTO: 0.2 EU/DL (ref 0.2–1)
VLDLC SERPL CALC-MCNC: 9.2 MG/DL
WBC # BLD AUTO: 3.7 K/UL (ref 4.1–11.1)
WBC URNS QL MICRO: ABNORMAL /HPF (ref 0–4)

## 2022-01-18 PROCEDURE — G8753 SYS BP > OR = 140: HCPCS | Performed by: FAMILY MEDICINE

## 2022-01-18 PROCEDURE — G8510 SCR DEP NEG, NO PLAN REQD: HCPCS | Performed by: FAMILY MEDICINE

## 2022-01-18 PROCEDURE — 1101F PT FALLS ASSESS-DOCD LE1/YR: CPT | Performed by: FAMILY MEDICINE

## 2022-01-18 PROCEDURE — 99213 OFFICE O/P EST LOW 20 MIN: CPT | Performed by: FAMILY MEDICINE

## 2022-01-18 PROCEDURE — G8536 NO DOC ELDER MAL SCRN: HCPCS | Performed by: FAMILY MEDICINE

## 2022-01-18 PROCEDURE — G8754 DIAS BP LESS 90: HCPCS | Performed by: FAMILY MEDICINE

## 2022-01-18 PROCEDURE — 3017F COLORECTAL CA SCREEN DOC REV: CPT | Performed by: FAMILY MEDICINE

## 2022-01-18 PROCEDURE — G8420 CALC BMI NORM PARAMETERS: HCPCS | Performed by: FAMILY MEDICINE

## 2022-01-18 PROCEDURE — G8427 DOCREV CUR MEDS BY ELIG CLIN: HCPCS | Performed by: FAMILY MEDICINE

## 2022-01-18 PROCEDURE — G0439 PPPS, SUBSEQ VISIT: HCPCS | Performed by: FAMILY MEDICINE

## 2022-01-18 RX ORDER — POTASSIUM CHLORIDE 20 MEQ/1
20 TABLET, EXTENDED RELEASE ORAL 2 TIMES DAILY
Qty: 180 TABLET | Refills: 1
Start: 2022-01-18 | End: 2022-06-19 | Stop reason: SDUPTHER

## 2022-01-18 RX ORDER — TORSEMIDE 100 MG/1
100 TABLET ORAL DAILY
Qty: 30 TABLET | Refills: 2 | Status: SHIPPED | OUTPATIENT
Start: 2022-01-18 | End: 2022-04-12 | Stop reason: SDUPTHER

## 2022-01-18 RX ORDER — ZOSTER VACCINE RECOMBINANT, ADJUVANTED 50 MCG/0.5
0.5 KIT INTRAMUSCULAR ONCE
Qty: 0.5 ML | Refills: 0 | Status: SHIPPED | OUTPATIENT
Start: 2022-01-18 | End: 2022-01-18

## 2022-01-18 NOTE — PATIENT INSTRUCTIONS
Medicare Wellness Visit, Male    The best way to live healthy is to have a lifestyle where you eat a well-balanced diet, exercise regularly, limit alcohol use, and quit all forms of tobacco/nicotine, if applicable. Regular preventive services are another way to keep healthy. Preventive services (vaccines, screening tests, monitoring & exams) can help personalize your care plan, which helps you manage your own care. Screening tests can find health problems at the earliest stages, when they are easiest to treat. [facility] follows the current, evidence-based guidelines published by the [facility] ([facility]) when recommending preventive services for our patients. Because we follow these guidelines, sometimes recommendations change over time as research supports it. (For example, a prostate screening blood test is no longer routinely recommended for men with no symptoms). Of course, you and your doctor may decide to screen more often for some diseases, based on your risk and co-morbidities (chronic disease you are already diagnosed with). Preventive services for you include:  - Medicare offers their members a free annual wellness visit, which is time for you and your primary care provider to discuss and plan for your preventive service needs. Take advantage of this benefit every year!  -All adults over age 72 should receive the recommended pneumonia vaccines. Current USPSTF guidelines recommend a series of two vaccines for the best pneumonia protection.   -All adults should have a flu vaccine yearly and tetanus vaccine every 10 years.  -All adults age 48 and older should receive the shingles vaccines (series of two vaccines).        -All adults age 38-68 who are overweight should have a diabetes screening test once every three years.   -Other screening tests & preventive services for persons with diabetes include: an eye exam to screen for diabetic retinopathy, a kidney function test, a foot exam, and stricter control over your cholesterol.   -Cardiovascular screening for adults with routine risk involves an electrocardiogram (ECG) at intervals determined by the provider.   -Colorectal cancer screening should be done for adults age 54-65 with no increased risk factors for colorectal cancer. There are a number of acceptable methods of screening for this type of cancer. Each test has its own benefits and drawbacks. Discuss with your provider what is most appropriate for you during your annual wellness visit. The different tests include: colonoscopy (considered the best screening method), a fecal occult blood test, a fecal DNA test, and sigmoidoscopy.  -All adults born between [facility] should be screened once for Hepatitis C.  -An Abdominal Aortic Aneurysm (AAA) Screening is recommended for men age 73-68 who has ever smoked in their lifetime. Here is a list of your current Health Maintenance items (your personalized list of preventive services) with a due date:  Health Maintenance Due   Topic Date Due    Shingles Vaccine (1 of 2) Never done    Colorectal Screening  02/05/2020    COVID-19 Vaccine (3 - Booster for Moderna series) 10/02/2021        Leg and Ankle Edema: Care Instructions  Your Care Instructions  Swelling in the legs, ankles, and feet is called edema. It is common after you sit or stand for a while. Long plane flights or car rides often cause swelling in the legs and feet. You may also have swelling if you have to stand for long periods of time at your job. Problems with the veins in the legs (varicose veins) and changes in hormones can also cause swelling. Sometimes the swelling in the ankles and feet is caused by a more serious problem, such as heart failure, infection, blood clots, or liver or kidney disease. Follow-up care is a key part of your treatment and safety.  Be sure to make and go to all appointments, and call your doctor if you are having problems. It's also a good idea to know your test results and keep a list of the medicines you take. How can you care for yourself at home? · If your doctor gave you medicine, take it as prescribed. Call your doctor if you think you are having a problem with your medicine. · Whenever you are resting, raise your legs up. Try to keep the swollen area higher than the level of your heart. · Take breaks from standing or sitting in one position. ? Walk around to increase the blood flow in your lower legs. ? Move your feet and ankles often while you stand, or tighten and relax your leg muscles. · Wear support stockings. Put them on in the morning, before swelling gets worse. · Eat a balanced diet. Lose weight if you need to. · Limit the amount of salt (sodium) in your diet. Salt holds fluid in the body and may increase swelling. When should you call for help? Call 911 anytime you think you may need emergency care. For example, call if:    · You have symptoms of a blood clot in your lung (called a pulmonary embolism). These may include:  ? Sudden chest pain. ? Trouble breathing. ? Coughing up blood. Call your doctor now or seek immediate medical care if:    · You have signs of a blood clot, such as:  ? Pain in your calf, back of the knee, thigh, or groin. ? Redness and swelling in your leg or groin.     · You have symptoms of infection, such as:  ? Increased pain, swelling, warmth, or redness. ? Red streaks or pus. ? A fever. Watch closely for changes in your health, and be sure to contact your doctor if:    · Your swelling is getting worse.     · You have new or worsening pain in your legs.     · You do not get better as expected. Where can you learn more? Go to http://www.gray.com/  Enter J186 in the search box to learn more about \"Leg and Ankle Edema: Care Instructions. \"  Current as of: July 1, 2021               Content Version: 13.0  © 2313-0887 [employer]. Care instructions adapted under license by [employer] (which disclaims liability or warranty for this information). If you have questions about a medical condition or this instruction, always ask your healthcare professional. [address] any warranty or liability for your use of this information.

## 2022-01-18 NOTE — PROGRESS NOTES
Chief Complaint   Patient presents with   Yanely Stiles Annual Wellness Visit     1. Have you been to the ER, urgent care clinic since your last visit? Hospitalized since your last visit? No    2. Have you seen or consulted any other health care providers outside of the [address] [place] [address] since your last visit? Include any pap smears or colon screening. No    Verified patient with two type of identifiers.   denies  C/o at present  Declines flu vaccine

## 2022-01-18 NOTE — PROGRESS NOTES
HISTORY OF PRESENT ILLNESS  Gely Feng is a 78 y.o. male. Today's Covid vaccinated x2 needs the booster the sooner the better,   Pt main concerns were provided in the clinic,    pt is w/ comorbid history and   Pt has been trying to wear mask most of the times,    pt has no fever no cough no dyspnea, no ha, not dizzy, nl smell nl taste, no N/V/D, has no orbital pain,  no body ache. Prediabetic state   Pt with nl KFT, and elevated PSA level of 6.3 last year was 3.7 patient has been staying between 2 and 3 since 2013,  present with fasting gluc level of 108 on the last visit,  last a1c was at 6., currently on no diabetic meds, having the diabetic diet, andhas been active with daily exercise, pt denies any tingling sensation, polyurea and polydipsia,     Patient complains of edema. The location of the edema is lower leg(s) bilateral, ankle(s) bilateral, feet bilateral.  Pt has been takin 60mg of torsemide no change so far, The edema has been moderate. Onset of symptoms was a few weeks ago, gradually worsening since that time. The edema is present all day. The patient states the problem is long-standing. the patient denies leg pain, denies rash, and legs lesion,and the denial of dizziness,   the wt has not changed    BUN 6 - 20 MG/DL 12  10  14  11 R  14 R  10 R  8 R    Creatinine 0.70 - 1.30 MG/DL 1.32 High   1.33 High   1.17  0.87 R  1.11 R  1.15 R  0.98 R    BUN/Creatinine ratio 12 - 20   9 Low   8 Low   12  13 R  13 R  9 Low  R  8 Low  R    GFR est AA >60 ml/min/1.73m2 >60  >60  >60  99 R  76 R  73 R  89 R    GFR est non-AA >60 ml/min/1.73m2 53 Low   53 Low  CM  >60              Current Outpatient Medications   Medication Sig Dispense Refill    torsemide (DEMADEX) 20 mg tablet Take 3 Tablets by mouth daily. 270 Tablet 1    potassium chloride (K-DUR, KLOR-CON) 20 mEq tablet Take 1 Tablet by mouth two (2) times a day.  Indications: prevention of low potassium in the blood 180 Tablet 1    cholecalciferol (Vitamin D3) 25 mcg (1,000 unit) cap One tab daily (Patient taking differently: Take 400 Units/day by mouth. One tab daily) 90 Capsule 3    cyanocobalamin (Vitamin B-12) 1,000 mcg tablet Take 1 Tab by mouth daily. 30 Tab 6    rosuvastatin (CRESTOR) 5 mg tablet Take 1 Tab by mouth nightly. 30 Tab 6    fluticasone propionate (FLONASE) 50 mcg/actuation nasal spray 1 [place] by Both Nostrils route daily. Indications: inflammation of the nose due to an allergy (Patient taking differently: 1 Spray by Both Nostrils route as needed. Indications: inflammation of the nose due to an allergy) 1 Bottle 5    aspirin delayed-release 81 mg tablet Take  by mouth daily.  triamcinolone acetonide (KENALOG) 0.1 % topical cream Apply  to affected area two (2) times a day. use thin layer (Patient taking differently: Apply  to affected area two (2) times daily as needed.  use thin layer) 80 g 1     No Known Allergies  Past Medical History:   Diagnosis Date    Elevated PSA, less than 10 ng/ml 12/10/2012    Hyperlipidemia     Hypertension     Hypokalemia 7/25/2014    Localized edema 2/7/2017    Long term current use of anticoagulant therapy     Patient is full code 9/18/2018    Syncope     Vitamin B12 deficiency neuropathy ([facility] [address] 6/18/2019    Vitamin D deficiency 7/8/2014    WBC decreased 7/6/2012     Past Surgical History:   Procedure Laterality Date    COLONOSCOPY,DIAGNOSTIC  2/5/2015         HX COLONOSCOPY      HX ORTHOPAEDIC      left knee surg in  1984     Family History   Problem Relation Age of Onset    Kidney Disease Father     Hypertension Mother      Social History     Tobacco Use    Smoking status: Never Smoker    Smokeless tobacco: Never Used   Substance Use Topics    Alcohol use: No      Lab Results   Component Value Date/Time    Hemoglobin A1c 5.8 (H) 10/19/2021 10:50 AM    Hemoglobin A1c 6.5 (H) 01/13/2016 09:03 AM    Hemoglobin A1c 6.3 (H) 07/13/2015 09:18 AM    Glucose 108 (H) 10/19/2021 10:50 AM LDL, calculated 66.6 10/19/2021 10:50 AM    Creatinine 1.32 (H) 10/19/2021 10:50 AM      Lab Results   Component Value Date/Time    Cholesterol, total 143 10/19/2021 10:50 AM    HDL Cholesterol 66 10/19/2021 10:50 AM    LDL, calculated 66.6 10/19/2021 10:50 AM    Triglyceride 52 10/19/2021 10:50 AM    CHOL/HDL Ratio 2.2 10/19/2021 10:50 AM        Review of Systems   Constitutional: Negative for chills and fever. HENT: Negative for congestion and nosebleeds. Eyes: Negative for blurred vision and pain. Respiratory: Negative for cough, shortness of breath and wheezing. Cardiovascular: Positive for leg swelling. Negative for chest pain. Gastrointestinal: Negative for constipation, diarrhea, nausea and vomiting. Genitourinary: Negative for dysuria and frequency. Musculoskeletal: Negative for joint pain and myalgias. Skin: Negative for itching and rash. Neurological: Negative for dizziness, loss of consciousness and headaches. Psychiatric/Behavioral: Negative for depression. The patient is not nervous/anxious and does not have insomnia. Physical Exam  Vitals and nursing note reviewed. Constitutional:       Appearance: He is well-developed. HENT:      Head: Normocephalic and atraumatic. Mouth/Throat:      Pharynx: No oropharyngeal exudate. Eyes:      Conjunctiva/sclera: Conjunctivae normal.      Pupils: Pupils are equal, round, and reactive to light. Neck:      Thyroid: No thyromegaly. Vascular: No JVD. Cardiovascular:      Rate and Rhythm: Normal rate and regular rhythm. Heart sounds: Normal heart sounds. No murmur heard. No friction rub. Pulmonary:      Effort: Pulmonary effort is normal. No respiratory distress. Breath sounds: Normal breath sounds. No wheezing or rales. Abdominal:      General: Bowel sounds are normal. There is no distension. Palpations: Abdomen is soft. Tenderness: There is no abdominal tenderness.    Musculoskeletal: General: No tenderness. Cervical back: Normal range of motion and neck supple. Right lower leg: Edema present. Left lower leg: Edema present. Lymphadenopathy:      Cervical: No cervical adenopathy. Skin:     General: Skin is warm. Findings: No erythema or rash. Neurological:      Mental Status: He is alert and oriented to person, place, and time. Deep Tendon Reflexes: Reflexes are normal and symmetric. Psychiatric:         Behavior: Behavior normal.         ASSESSMENT and PLAN  Diagnoses and all orders for this visit:    1. Medicare annual wellness visit, subsequent    2. Localized edema  -     torsemide (DEMADEX) 100 mg tablet; Take 1 Tablet by mouth daily.  -     PSA, DIAGNOSTIC (PROSTATE SPECIFIC AG); Future  -     potassium chloride (K-DUR, KLOR-CON M20) 20 mEq tablet; Take 1 Tablet by mouth two (2) times a day. Indications: prevention of low potassium in the blood  -     XR CHEST PA LAT; Future    3. Hypertension, unspecified type  -     torsemide (DEMADEX) 100 mg tablet; Take 1 Tablet by mouth daily. -     potassium chloride (K-DUR, KLOR-CON M20) 20 mEq tablet; Take 1 Tablet by mouth two (2) times a day. Indications: prevention of low potassium in the blood    4. Vitamin B12 deficiency neuropathy (HCC)  -     torsemide (DEMADEX) 100 mg tablet; Take 1 Tablet by mouth daily. -     potassium chloride (K-DUR, KLOR-CON M20) 20 mEq tablet; Take 1 Tablet by mouth two (2) times a day. Indications: prevention of low potassium in the blood    5. Platelets decreased ([address]    6. Advanced directives, counseling/discussion  -     ADVANCE CARE PLANNING FIRST 27 MINS  -     FULL CODE    7. Screen for colon cancer  -     REFERRAL TO GASTROENTEROLOGY    8. Laboratory exam ordered as part of routine general medical examination  -     CBC WITH AUTOMATED DIFF; Future  -     LIPID PANEL; Future  -     METABOLIC PANEL, COMPREHENSIVE; Future  -     HEMOGLOBIN A1C WITH EAG;  Future  -     TSH 3RD GENERATION; Future  -     T4, FREE; Future  -     URINALYSIS W/ RFLX MICROSCOPIC; Future    9. Disorder of prostate, unspecified   -     PSA, DIAGNOSTIC (PROSTATE SPECIFIC AG); Future    Other orders  -     varicella-zoster recombinant, PF, (Shingrix, PF,) 50 mcg/0.5 mL susr injection; 0.5 mL by IntraMUSCular route once for 1 dose. This is the Subsequent Medicare Annual Wellness Exam, performed 12 months or more after the Initial AWV or the last Subsequent AWV    I have reviewed the patient's medical history in detail and updated the computerized patient record. Assessment/Plan   Education and counseling provided:  Are appropriate based on today's review and evaluation  End-of-Life planning (with patient's consent)  Pneumococcal Vaccine  Influenza Vaccine  Prostate cancer screening tests (PSA, covered annually)  Colorectal cancer screening tests    1. Medicare annual wellness visit, subsequent  2. Localized edema  -     torsemide (DEMADEX) 100 mg tablet; Take 1 Tablet by mouth daily. , Normal, Disp-30 Tablet, R-2  -     PSA, DIAGNOSTIC (PROSTATE SPECIFIC AG); Future  -     potassium chloride (K-DUR, KLOR-CON M20) 20 mEq tablet; Take 1 Tablet by mouth two (2) times a day. Indications: prevention of low potassium in the blood, No Print, Disp-180 Tablet, R-1  -     XR CHEST PA LAT; Future  3. Hypertension, unspecified type  -     torsemide (DEMADEX) 100 mg tablet; Take 1 Tablet by mouth daily. , Normal, Disp-30 Tablet, R-2  -     potassium chloride (K-DUR, KLOR-CON M20) 20 mEq tablet; Take 1 Tablet by mouth two (2) times a day. Indications: prevention of low potassium in the blood, No Print, Disp-180 Tablet, R-1  4. Vitamin B12 deficiency neuropathy (HCC)  -     torsemide (DEMADEX) 100 mg tablet; Take 1 Tablet by mouth daily. , Normal, Disp-30 Tablet, R-2  -     potassium chloride (K-DUR, KLOR-CON M20) 20 mEq tablet; Take 1 Tablet by mouth two (2) times a day.  Indications: prevention of low potassium in the blood, No Print, Disp-180 Tablet, R-1  5. Platelets decreased ([address]  6. Advanced directives, counseling/discussion  -     ADVANCE CARE PLANNING FIRST 27 MINS  -     FULL CODE  7. Screen for colon cancer  -     REFERRAL TO GASTROENTEROLOGY  8. Laboratory exam ordered as part of routine general medical examination  -     CBC WITH AUTOMATED DIFF; Future  -     LIPID PANEL; Future  -     METABOLIC PANEL, COMPREHENSIVE; Future  -     HEMOGLOBIN A1C WITH EAG; Future  -     TSH 3RD GENERATION; Future  -     T4, FREE; Future  -     URINALYSIS W/ RFLX MICROSCOPIC; Future  9. Disorder of prostate, unspecified   -     PSA, DIAGNOSTIC (PROSTATE SPECIFIC AG); Future  10. Elevated PSA measurement       Depression Risk Factor Screening     3 most recent PHQ Screens 1/18/2022   Little interest or pleasure in doing things Not at all   Feeling down, depressed, irritable, or hopeless Not at all   Total Score PHQ 2 0       Alcohol & Drug Abuse Risk Screen    Do you average more than 1 drink per night or more than 7 drinks a week: No    In the past three months have you have had more than 4 drinks containing alcohol on one occasion: No          Functional Ability and Level of Safety    Hearing: Hearing is good. Activities of Daily Living: The home contains: handrails  Patient does total self care      Ambulation: with no difficulty     Fall Risk:  Fall Risk Assessment, last 12 mths 1/18/2022   Able to walk? Yes   Fall in past 12 months? 0   Do you feel unsteady? 0   Are you worried about falling 0   Is TUG test greater than 12 seconds?  -   Is the gait abnormal? -      Abuse Screen:  Patient is not abused       Cognitive Screening    Has your family/caregiver stated any concerns about your memory: no     Cognitive Screening: Normal - nl    Health Maintenance Due     Health Maintenance Due   Topic Date Due    Shingrix Vaccine Age 49> (1 of 2) Never done    Colorectal Cancer Screening Combo  02/05/2020    COVID-19 Vaccine (3 - Booster for Moderna series) 10/02/2021       Patient Care Team   Patient Care Team:  Chapito Bello MD as PCP - General (Family Medicine)  Chapito Bello MD as PCP - [facility]  Petty Queen MD as Physician (Cardiology)    History     Patient Active Problem List   Diagnosis Code    HTN (hypertension) I10    B12 deficiency E53.8    Pre-diabetes R73.03    WBC decreased D72.819    Elevated PSA, less than 10 ng/ml R97.20    Hypercholesteremia E78.00    Platelets decreased ([address] D69.6    Vitamin D deficiency E55.9    Hypokalemia E87.6    Localized edema R60.0    Patient is full code Z78.9    Vitamin B12 deficiency neuropathy ([facility] [address] E53.8, G63    Leg swelling M79.89    Pain in both lower extremities M79.604, M79.605    Decreased pedal pulses R09.89    Venous insufficiency of both lower extremities I87.2    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, 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     Past Medical History:   Diagnosis Date    Elevated PSA, less than 10 ng/ml 12/10/2012    Hyperlipidemia     Hypertension     Hypokalemia 7/25/2014    Localized edema 2/7/2017    Long term current use of anticoagulant therapy     Patient is full code 9/18/2018    Syncope     Vitamin B12 deficiency neuropathy ([facility] [address] 6/18/2019    Vitamin D deficiency 7/8/2014    WBC decreased 7/6/2012      Past Surgical History:   Procedure Laterality Date    COLONOSCOPY,DIAGNOSTIC  2/5/2015         HX COLONOSCOPY      HX ORTHOPAEDIC      left knee surg in  1984     Current Outpatient Medications   Medication Sig Dispense Refill    torsemide (DEMADEX) 20 mg tablet Take 3 Tablets by mouth daily. 270 Tablet 1    potassium chloride (K-DUR, KLOR-CON) 20 mEq tablet Take 1 Tablet by mouth two (2) times a day. Indications: prevention of low potassium in the blood 180 Tablet 1    cholecalciferol (Vitamin D3) 25 mcg (1,000 unit) cap One tab daily (Patient taking differently: Take 400 Units/day by mouth.  One tab daily) 90 Capsule 3    cyanocobalamin (Vitamin B-12) 1,000 mcg tablet Take 1 Tab by mouth daily. 30 Tab 6    rosuvastatin (CRESTOR) 5 mg tablet Take 1 Tab by mouth nightly. 30 Tab 6    fluticasone propionate (FLONASE) 50 mcg/actuation nasal spray 1 [place] by Both Nostrils route daily. Indications: inflammation of the nose due to an allergy (Patient taking differently: 1 Spray by Both Nostrils route as needed. Indications: inflammation of the nose due to an allergy) 1 Bottle 5    aspirin delayed-release 81 mg tablet Take  by mouth daily.  triamcinolone acetonide (KENALOG) 0.1 % topical cream Apply  to affected area two (2) times a day. use thin layer (Patient taking differently: Apply  to affected area two (2) times daily as needed.  use thin layer) 80 g 1     No Known Allergies    Family History   Problem Relation Age of Onset    Kidney Disease Father     Hypertension Mother      Social History     Tobacco Use    Smoking status: Never Smoker    Smokeless tobacco: Never Used   Substance Use Topics    Alcohol use: No         Jane Montalvo MD

## 2022-01-18 NOTE — ACP (ADVANCE CARE PLANNING)
Advance Care Planning            Advance Care Planning (ACP) Physician         Conversation Conducted with:   Patient with Decision Making Capacity     Other Legally Authorized Decision Maker would be girlfriend as SDM      Patient is on presence of no family member,, stated that the pt wants to be not DNR at this time,  The pt likes to be a full code individual,  The patient states that there is a lot of will to live,  Pt was given the form,   will sign and bring us a copy on the later date.       Conversation Outcomes / Follow-Up Plan:   Completed new Advance Directive      Length of ACP Conversation in minutes:  16 minutes            Jane Montalvo MD

## 2022-03-01 ENCOUNTER — OFFICE VISIT (OUTPATIENT)
Dept: FAMILY MEDICINE CLINIC | Age: 77
End: 2022-03-01
Payer: MEDICARE

## 2022-03-01 VITALS
HEART RATE: 60 BPM | BODY MASS INDEX: 24.51 KG/M2 | SYSTOLIC BLOOD PRESSURE: 99 MMHG | RESPIRATION RATE: 16 BRPM | DIASTOLIC BLOOD PRESSURE: 65 MMHG | TEMPERATURE: 98.7 F | WEIGHT: 185.8 LBS

## 2022-03-01 DIAGNOSIS — R60.0 LOCALIZED EDEMA: ICD-10-CM

## 2022-03-01 DIAGNOSIS — Z71.89 ADVICE GIVEN ABOUT COVID-19 VIRUS INFECTION: ICD-10-CM

## 2022-03-01 DIAGNOSIS — Z12.11 SCREENING FOR COLON CANCER: ICD-10-CM

## 2022-03-01 DIAGNOSIS — M79.89 LEG SWELLING: Primary | ICD-10-CM

## 2022-03-01 DIAGNOSIS — R97.20 ELEVATED PSA MEASUREMENT: ICD-10-CM

## 2022-03-01 PROCEDURE — G0463 HOSPITAL OUTPT CLINIC VISIT: HCPCS | Performed by: FAMILY MEDICINE

## 2022-03-01 PROCEDURE — G8536 NO DOC ELDER MAL SCRN: HCPCS | Performed by: FAMILY MEDICINE

## 2022-03-01 PROCEDURE — G8510 SCR DEP NEG, NO PLAN REQD: HCPCS | Performed by: FAMILY MEDICINE

## 2022-03-01 PROCEDURE — 99213 OFFICE O/P EST LOW 20 MIN: CPT | Performed by: FAMILY MEDICINE

## 2022-03-01 PROCEDURE — G8420 CALC BMI NORM PARAMETERS: HCPCS | Performed by: FAMILY MEDICINE

## 2022-03-01 PROCEDURE — 1101F PT FALLS ASSESS-DOCD LE1/YR: CPT | Performed by: FAMILY MEDICINE

## 2022-03-01 PROCEDURE — G8427 DOCREV CUR MEDS BY ELIG CLIN: HCPCS | Performed by: FAMILY MEDICINE

## 2022-03-01 PROCEDURE — G8752 SYS BP LESS 140: HCPCS | Performed by: FAMILY MEDICINE

## 2022-03-01 PROCEDURE — G8754 DIAS BP LESS 90: HCPCS | Performed by: FAMILY MEDICINE

## 2022-03-01 PROCEDURE — 3017F COLORECTAL CA SCREEN DOC REV: CPT | Performed by: FAMILY MEDICINE

## 2022-03-01 RX ORDER — ZOSTER VACCINE RECOMBINANT, ADJUVANTED 50 MCG/0.5
0.5 KIT INTRAMUSCULAR ONCE
Qty: 0.5 ML | Refills: 0 | Status: SHIPPED | OUTPATIENT
Start: 2022-03-01 | End: 2022-03-01

## 2022-03-01 NOTE — PROGRESS NOTES
HISTORY OF PRESENT ILLNESS  Lauren De Jesus is a 78 y.o. male. Patient present for fu on his chronic improving lower ext edema. The location of the edema is lower leg(s) bilateral, ankle(s) bilateral, feet bilateral.      The edema has been moderate. The patient states the problem is long-standing. Today the patient denies leg pain, denies rash, and legs lesion,and the denial of dizziness,   the wt went down,       onent Ref Range & Units 1/18/22 1044 10/19/21 1050 1/13/16 0903 7/13/15 0918 1/26/15 1021 7/8/14 1237 11/18/13 0806   Hemoglobin A1c 4.0 - 5.6 % 6.0 High   5.8 High  CM  6.5 High  R, CM  6.3 High  R, CM  6.4 High  R, CM  6.5 High  R, CM  6.1 High  R, CM      psa elvaion pt asx  Opted on urologist  Opted cardiologist  Component Ref Range & Units 1/18/22 1044 10/19/21 1050 3/21/17 1001 2/7/17 0921 1/26/15 1021 7/8/14 1237 11/18/13 0806   Prostate Specific Ag 0.01 - 4.0 ng/mL 6.3 High   3.7 CM  2.2 R, CM  1.6 R, CM  2.3 R, CM  2.9 R, CM  2.2              Current Outpatient Medications   Medication Sig Dispense Refill    varicella-zoster recombinant, PF, (Shingrix, PF,) 50 mcg/0.5 mL susr injection 0.5 mL by IntraMUSCular route once for 1 dose. 0.5 mL 0    torsemide (DEMADEX) 100 mg tablet Take 1 Tablet by mouth daily. 30 Tablet 2    potassium chloride (K-DUR, KLOR-CON M20) 20 mEq tablet Take 1 Tablet by mouth two (2) times a day. Indications: prevention of low potassium in the blood 180 Tablet 1    cyanocobalamin (Vitamin B-12) 1,000 mcg tablet Take 1 Tab by mouth daily. 30 Tab 6    rosuvastatin (CRESTOR) 5 mg tablet Take 1 Tab by mouth nightly. 30 Tab 6    aspirin delayed-release 81 mg tablet Take  by mouth daily.  cholecalciferol (Vitamin D3) 25 mcg (1,000 unit) cap One tab daily (Patient taking differently: Take 400 Units/day by mouth. One tab daily) 90 Capsule 3    triamcinolone acetonide (KENALOG) 0.1 % topical cream Apply  to affected area two (2) times a day.  use thin layer (Patient taking differently: Apply  to affected area two (2) times daily as needed. use thin layer) 80 g 1    fluticasone propionate (FLONASE) 50 mcg/actuation nasal spray 1 [place] by Both Nostrils route daily. Indications: inflammation of the nose due to an allergy (Patient taking differently: 1 Spray by Both Nostrils route as needed. Indications: inflammation of the nose due to an allergy) 1 Bottle 5     No Known Allergies  Past Medical History:   Diagnosis Date    Elevated PSA, less than 10 ng/ml 12/10/2012    Hyperlipidemia     Hypertension     Hypokalemia 7/25/2014    Localized edema 2/7/2017    Long term current use of anticoagulant therapy     Patient is full code 9/18/2018    Syncope     Vitamin B12 deficiency neuropathy ([facility] [address] 6/18/2019    Vitamin D deficiency 7/8/2014    WBC decreased 7/6/2012     Past Surgical History:   Procedure Laterality Date    COLONOSCOPY,DIAGNOSTIC  2/5/2015         HX COLONOSCOPY      HX ORTHOPAEDIC      left knee surg in  1984     Family History   Problem Relation Age of Onset    Kidney Disease Father     Hypertension Mother      Social History     Tobacco Use    Smoking status: Never Smoker    Smokeless tobacco: Never Used   Substance Use Topics    Alcohol use: No      Lab Results   Component Value Date/Time    Hemoglobin A1c 6.0 (H) 01/18/2022 10:44 AM    Hemoglobin A1c 5.8 (H) 10/19/2021 10:50 AM    Hemoglobin A1c 6.5 (H) 01/13/2016 09:03 AM    Glucose 95 01/18/2022 10:44 AM    LDL, calculated 47.8 01/18/2022 10:44 AM    Creatinine 1.03 01/18/2022 10:44 AM      Lab Results   Component Value Date/Time    Cholesterol, total 128 01/18/2022 10:44 AM    HDL Cholesterol 71 01/18/2022 10:44 AM    LDL, calculated 47.8 01/18/2022 10:44 AM    Triglyceride 46 01/18/2022 10:44 AM    CHOL/HDL Ratio 1.8 01/18/2022 10:44 AM        Review of Systems   Constitutional: Negative for chills and fever. HENT: Negative for congestion and nosebleeds.     Eyes: Negative for blurred vision and pain. Respiratory: Negative for cough, shortness of breath and wheezing. Cardiovascular: Positive for leg swelling. Negative for chest pain. Gastrointestinal: Negative for constipation, diarrhea, nausea and vomiting. Genitourinary: Negative for dysuria and frequency. Musculoskeletal: Negative for joint pain and myalgias. Skin: Negative for itching and rash. Neurological: Negative for dizziness, loss of consciousness and headaches. Psychiatric/Behavioral: Negative for depression. The patient is not nervous/anxious and does not have insomnia. Physical Exam  Vitals and nursing note reviewed. Constitutional:       Appearance: He is well-developed. HENT:      Head: Normocephalic and atraumatic. Mouth/Throat:      Pharynx: No oropharyngeal exudate. Eyes:      Conjunctiva/sclera: Conjunctivae normal.      Pupils: Pupils are equal, round, and reactive to light. Neck:      Thyroid: No thyromegaly. Vascular: No JVD. Cardiovascular:      Rate and Rhythm: Normal rate and regular rhythm. Heart sounds: Normal heart sounds. No murmur heard. No friction rub. Pulmonary:      Effort: Pulmonary effort is normal. No respiratory distress. Breath sounds: Normal breath sounds. No wheezing or rales. Abdominal:      General: Bowel sounds are normal. There is no distension. Palpations: Abdomen is soft. Tenderness: There is no abdominal tenderness. Musculoskeletal:         General: No tenderness. Cervical back: Normal range of motion and neck supple. Right lower leg: Edema present. Left lower leg: Edema present. Lymphadenopathy:      Cervical: No cervical adenopathy. Skin:     General: Skin is warm. Findings: No erythema or rash. Neurological:      Mental Status: He is alert and oriented to person, place, and time. Deep Tendon Reflexes: Reflexes are normal and symmetric.    Psychiatric:         Behavior: Behavior normal.         ASSESSMENT and PLAN  Diagnoses and all orders for this visit:    1. Leg swelling    2. Screening for colon cancer  -     REFERRAL FOR COLONOSCOPY    3. Elevated PSA measurement    4. Localized edema    5. Advice given about COVID-19 virus infection    Other orders  -     varicella-zoster recombinant, PF, (Shingrix, PF,) 50 mcg/0.5 mL susr injection; 0.5 mL by IntraMUSCular route once for 1 dose.              Repeat kft an dk level in 6 wks,

## 2022-03-01 NOTE — PROGRESS NOTES
Chief Complaint   Patient presents with    Hypertension     1. Have you been to the ER, urgent care clinic since your last visit? Hospitalized since your last visit? No    2. Have you seen or consulted any other health care providers outside of the [address] [place] [address] since your last visit? Include any pap smears or colon screening. No  .  verified patient with two type of identifiers.   denies c/o at present

## 2022-03-18 PROBLEM — R60.0 LOCALIZED EDEMA: Status: ACTIVE | Noted: 2017-02-07

## 2022-03-18 PROBLEM — I83.12 VARICOSE VEINS OF BOTH LOWER EXTREMITIES WITH INFLAMMATION: Status: ACTIVE | Noted: 2021-09-21

## 2022-03-18 PROBLEM — E53.8 VITAMIN B12 DEFICIENCY NEUROPATHY (HCC): Status: ACTIVE | Noted: 2019-06-18

## 2022-03-18 PROBLEM — I83.11 VARICOSE VEINS OF BOTH LOWER EXTREMITIES WITH INFLAMMATION: Status: ACTIVE | Noted: 2021-09-21

## 2022-03-18 PROBLEM — G63 VITAMIN B12 DEFICIENCY NEUROPATHY (HCC): Status: ACTIVE | Noted: 2019-06-18

## 2022-03-19 PROBLEM — M79.605 PAIN IN BOTH LOWER EXTREMITIES: Status: ACTIVE | Noted: 2021-08-10

## 2022-03-19 PROBLEM — M79.89 LEG SWELLING: Status: ACTIVE | Noted: 2021-08-10

## 2022-03-19 PROBLEM — M79.604 PAIN IN BOTH LOWER EXTREMITIES: Status: ACTIVE | Noted: 2021-08-10

## 2022-03-19 PROBLEM — Z78.9 PATIENT IS FULL CODE: Status: ACTIVE | Noted: 2018-09-18

## 2022-03-19 PROBLEM — R09.89 DECREASED PEDAL PULSES: Status: ACTIVE | Noted: 2021-08-10

## 2022-03-20 PROBLEM — I87.2 VENOUS INSUFFICIENCY OF BOTH LOWER EXTREMITIES: Status: ACTIVE | Noted: 2021-09-21

## 2022-04-12 ENCOUNTER — OFFICE VISIT (OUTPATIENT)
Dept: FAMILY MEDICINE CLINIC | Age: 77
End: 2022-04-12
Payer: MEDICARE

## 2022-04-12 VITALS
HEART RATE: 91 BPM | RESPIRATION RATE: 16 BRPM | DIASTOLIC BLOOD PRESSURE: 61 MMHG | BODY MASS INDEX: 24.67 KG/M2 | SYSTOLIC BLOOD PRESSURE: 139 MMHG | TEMPERATURE: 97.5 F | WEIGHT: 187 LBS | OXYGEN SATURATION: 97 %

## 2022-04-12 DIAGNOSIS — E55.9 VITAMIN D DEFICIENCY: ICD-10-CM

## 2022-04-12 DIAGNOSIS — R60.0 LOCALIZED EDEMA: Primary | ICD-10-CM

## 2022-04-12 DIAGNOSIS — I10 HYPERTENSION, UNSPECIFIED TYPE: ICD-10-CM

## 2022-04-12 DIAGNOSIS — G63 VITAMIN B12 DEFICIENCY NEUROPATHY (HCC): ICD-10-CM

## 2022-04-12 DIAGNOSIS — E53.8 VITAMIN B12 DEFICIENCY NEUROPATHY (HCC): ICD-10-CM

## 2022-04-12 DIAGNOSIS — E53.8 B12 DEFICIENCY: ICD-10-CM

## 2022-04-12 DIAGNOSIS — D69.6 PLATELETS DECREASED (HCC): ICD-10-CM

## 2022-04-12 PROCEDURE — 3017F COLORECTAL CA SCREEN DOC REV: CPT | Performed by: FAMILY MEDICINE

## 2022-04-12 PROCEDURE — G8510 SCR DEP NEG, NO PLAN REQD: HCPCS | Performed by: FAMILY MEDICINE

## 2022-04-12 PROCEDURE — G8536 NO DOC ELDER MAL SCRN: HCPCS | Performed by: FAMILY MEDICINE

## 2022-04-12 PROCEDURE — G8420 CALC BMI NORM PARAMETERS: HCPCS | Performed by: FAMILY MEDICINE

## 2022-04-12 PROCEDURE — G8752 SYS BP LESS 140: HCPCS | Performed by: FAMILY MEDICINE

## 2022-04-12 PROCEDURE — G8754 DIAS BP LESS 90: HCPCS | Performed by: FAMILY MEDICINE

## 2022-04-12 PROCEDURE — 99213 OFFICE O/P EST LOW 20 MIN: CPT | Performed by: FAMILY MEDICINE

## 2022-04-12 PROCEDURE — G8427 DOCREV CUR MEDS BY ELIG CLIN: HCPCS | Performed by: FAMILY MEDICINE

## 2022-04-12 PROCEDURE — G0463 HOSPITAL OUTPT CLINIC VISIT: HCPCS | Performed by: FAMILY MEDICINE

## 2022-04-12 PROCEDURE — 1101F PT FALLS ASSESS-DOCD LE1/YR: CPT | Performed by: FAMILY MEDICINE

## 2022-04-12 RX ORDER — TORSEMIDE 100 MG/1
100 TABLET ORAL DAILY
Qty: 30 TABLET | Refills: 2 | Status: SHIPPED | OUTPATIENT
Start: 2022-04-12 | End: 2022-06-13 | Stop reason: SDUPTHER

## 2022-04-12 RX ORDER — GLUCOSAMINE SULFATE 1500 MG
POWDER IN PACKET (EA) ORAL
Qty: 90 CAPSULE | Refills: 3 | Status: SHIPPED | OUTPATIENT
Start: 2022-04-12

## 2022-04-12 NOTE — PROGRESS NOTES
HISTORY OF PRESENT ILLNESS  Irene Hernandez is a 78 y.o. male, A Covid vaccinated x3, and nicely masked Denies flu like sxs, with current medical history of  HTN, hypercholesteremia, chronic edema and seasonal allergies,  present with the following complaint and concern of   Patient complains of edema. The location of the edema is lower leg(s) bilateral, ankle(s) bilateral, feet bilateral.  The edema has been moderate. Pt has seen the cardiologist with nl BARBARA, and nl Echo,     Onset of symptoms was a few yrs ago, gradually improving since that time. The edema is present all day. The patient states the problem is long-standing. on the pt last visits, the patient did have much of the legs swelling, today the swelling of thelE is much better, pt happy with the progress,   Today the patient denies leg pain, denies rash, and legs lesion,and the denial of dizziness,   the wt went up,   GFR  60     Current Outpatient Medications   Medication Sig Dispense Refill    torsemide (DEMADEX) 100 mg tablet Take 1 Tablet by mouth daily. 30 Tablet 2    potassium chloride (K-DUR, KLOR-CON M20) 20 mEq tablet Take 1 Tablet by mouth two (2) times a day. Indications: prevention of low potassium in the blood 180 Tablet 1    cholecalciferol (Vitamin D3) 25 mcg (1,000 unit) cap One tab daily (Patient taking differently: Take 400 Units/day by mouth. One tab daily) 90 Capsule 3    cyanocobalamin (Vitamin B-12) 1,000 mcg tablet Take 1 Tab by mouth daily. 30 Tab 6    rosuvastatin (CRESTOR) 5 mg tablet Take 1 Tab by mouth nightly. 30 Tab 6    aspirin delayed-release 81 mg tablet Take  by mouth daily.  triamcinolone acetonide (KENALOG) 0.1 % topical cream Apply  to affected area two (2) times a day. use thin layer (Patient taking differently: Apply  to affected area two (2) times daily as needed. use thin layer) 80 g 1    fluticasone propionate (FLONASE) 50 mcg/actuation nasal spray 1 [place] by Both Nostrils route daily.  Indications: inflammation of the nose due to an allergy (Patient not taking: Reported on 4/12/2022) 1 Bottle 5     No Known Allergies  Past Medical History:   Diagnosis Date    Elevated PSA, less than 10 ng/ml 12/10/2012    Hyperlipidemia     Hypertension     Hypokalemia 7/25/2014    Localized edema 2/7/2017    Long term current use of anticoagulant therapy     Patient is full code 9/18/2018    Syncope     Vitamin B12 deficiency neuropathy ([facility] [address] 6/18/2019    Vitamin D deficiency 7/8/2014    WBC decreased 7/6/2012     Past Surgical History:   Procedure Laterality Date    COLONOSCOPY,DIAGNOSTIC  2/5/2015         HX COLONOSCOPY      HX ORTHOPAEDIC      left knee surg in  1984     Family History   Problem Relation Age of Onset    Kidney Disease Father     Hypertension Mother      Social History     Tobacco Use    Smoking status: Never Smoker    Smokeless tobacco: Never Used   Substance Use Topics    Alcohol use: No      Lab Results   Component Value Date/Time    Cholesterol, total 128 01/18/2022 10:44 AM    HDL Cholesterol 71 01/18/2022 10:44 AM    LDL, calculated 47.8 01/18/2022 10:44 AM    Triglyceride 46 01/18/2022 10:44 AM    CHOL/HDL Ratio 1.8 01/18/2022 10:44 AM     Lab Results   Component Value Date/Time    ALT (SGPT) 19 01/18/2022 10:44 AM    Alk. phosphatase 97 01/18/2022 10:44 AM    Bilirubin, total 0.5 01/18/2022 10:44 AM    Albumin 3.8 01/18/2022 10:44 AM    Protein, total 7.1 01/18/2022 10:44 AM    PLATELET 772 (L) 61/20/9494 10:44 AM        Review of Systems   Constitutional: Negative for chills and fever. HENT: Negative for congestion and nosebleeds. Eyes: Negative for blurred vision and pain. Respiratory: Negative for cough, shortness of breath and wheezing. Cardiovascular: Positive for leg swelling. Negative for chest pain. Gastrointestinal: Negative for constipation, diarrhea, nausea and vomiting. Genitourinary: Negative for dysuria and frequency.    Musculoskeletal: Negative for joint pain and myalgias. Skin: Negative for itching and rash. Neurological: Negative for dizziness, loss of consciousness and headaches. Psychiatric/Behavioral: Negative for depression. The patient is not nervous/anxious and does not have insomnia. Physical Exam  Vitals and nursing note reviewed. Constitutional:       Appearance: He is well-developed. HENT:      Head: Normocephalic and atraumatic. Mouth/Throat:      Pharynx: No oropharyngeal exudate. Eyes:      Conjunctiva/sclera: Conjunctivae normal.      Pupils: Pupils are equal, round, and reactive to light. Neck:      Thyroid: No thyromegaly. Vascular: No JVD. Cardiovascular:      Rate and Rhythm: Normal rate and regular rhythm. Heart sounds: Normal heart sounds. No murmur heard. No friction rub. Pulmonary:      Effort: Pulmonary effort is normal. No respiratory distress. Breath sounds: Normal breath sounds. No wheezing or rales. Abdominal:      General: Bowel sounds are normal. There is no distension. Palpations: Abdomen is soft. Tenderness: There is no abdominal tenderness. Musculoskeletal:         General: No tenderness. Cervical back: Normal range of motion and neck supple. Right lower leg: Edema present. Left lower leg: Edema present. Lymphadenopathy:      Cervical: No cervical adenopathy. Skin:     General: Skin is warm. Findings: No erythema or rash. Neurological:      Mental Status: He is alert and oriented to person, place, and time. Deep Tendon Reflexes: Reflexes are normal and symmetric. Psychiatric:         Behavior: Behavior normal.         ASSESSMENT and PLAN  Diagnoses and all orders for this visit:    1. Localized edema  -     torsemide (DEMADEX) 100 mg tablet; Take 1 Tablet by mouth daily.  -     METABOLIC PANEL, BASIC; Future    2. Hypertension, unspecified type  -     torsemide (DEMADEX) 100 mg tablet;  Take 1 Tablet by mouth daily.  -     METABOLIC PANEL, BASIC; Future    3. Platelets decreased ([address]  -     METABOLIC PANEL, BASIC; Future    4. B12 deficiency  -     METABOLIC PANEL, BASIC; Future    5. Vitamin B12 deficiency neuropathy (HCC)  -     torsemide (DEMADEX) 100 mg tablet; Take 1 Tablet by mouth daily.  -     METABOLIC PANEL, BASIC; Future    6. Vitamin D deficiency  -     cholecalciferol (Vitamin D3) 25 mcg (1,000 unit) cap;  One tab daily  -     METABOLIC PANEL, BASIC; Future      pt was advised about not to have an  increased salt intake,  and do the support stockings daily and off night, elevation of involved area, if any shortness of breath, high weight gain call for advise, avoid smoking/ tobacco exposure,and  sedentary life style, Leg elevation at all times or most of the times, use of two pillows at nights while in bed, ambulation as possible, pt agreed

## 2022-04-12 NOTE — PROGRESS NOTES
Chief Complaint   Patient presents with    Hypertension     1. Have you been to the ER, urgent care clinic since your last visit? Hospitalized since your last visit? No    2. Have you seen or consulted any other health care providers outside of the [address] [place] [address] since your last visit? Include any pap smears or colon screening. No     Health Maintenance   Topic Date Due    Shingrix Vaccine Age 49> (1 of 2) Never done    Colorectal Cancer Screening Combo  02/05/2020    Flu Vaccine (Season Ended) 09/01/2022    A1C test (Diabetic or Prediabetic)  01/18/2023    Lipid Screen  01/18/2023    Medicare Yearly Exam  01/19/2023    Depression Screen  03/01/2023    DTaP/Tdap/Td series (2 - Td or Tdap) 07/08/2024    Hepatitis C Screening  Completed    COVID-19 Vaccine  Completed    Pneumococcal 65+ years  Completed       Verified patient with two type of identifiers.   denies c/o at present

## 2022-04-12 NOTE — PATIENT INSTRUCTIONS
Leg and Ankle Edema: Care Instructions  Your Care Instructions  Swelling in the legs, ankles, and feet is called edema. It is common after you sit or stand for a while. Long plane flights or car rides often cause swelling in the legs and feet. You may also have swelling if you have to stand for long periods of time at your job. Problems with the veins in the legs (varicose veins) and changes in hormones can also cause swelling. Sometimes the swelling in the ankles and feet is caused by a more serious problem, such as heart failure, infection, blood clots, or liver or kidney disease. Follow-up care is a key part of your treatment and safety. Be sure to make and go to all appointments, and call your doctor if you are having problems. It's also a good idea to know your test results and keep a list of the medicines you take. How can you care for yourself at home? · If your doctor gave you medicine, take it as prescribed. Call your doctor if you think you are having a problem with your medicine. · Whenever you are resting, raise your legs up. Try to keep the swollen area higher than the level of your heart. · Take breaks from standing or sitting in one position. ? Walk around to increase the blood flow in your lower legs. ? Move your feet and ankles often while you stand, or tighten and relax your leg muscles. · Wear support stockings. Put them on in the morning, before swelling gets worse. · Eat a balanced diet. Lose weight if you need to. · Limit the amount of salt (sodium) in your diet. Salt holds fluid in the body and may increase swelling. When should you call for help? Call 911 anytime you think you may need emergency care. For example, call if:    · You have symptoms of a blood clot in your lung (called a pulmonary embolism). These may include:  ? Sudden chest pain. ? Trouble breathing. ? Coughing up blood.    Call your doctor now or seek immediate medical care if:    · You have signs of a blood clot, such as:  ? Pain in your calf, back of the knee, thigh, or groin. ? Redness and swelling in your leg or groin.     · You have symptoms of infection, such as:  ? Increased pain, swelling, warmth, or redness. ? Red streaks or pus. ? A fever. Watch closely for changes in your health, and be sure to contact your doctor if:    · Your swelling is getting worse.     · You have new or worsening pain in your legs.     · You do not get better as expected. Where can you learn more? Go to http://www.[employer].com/  Enter L221 in the search box to learn more about \"Leg and Ankle Edema: Care Instructions. \"  Current as of: July 1, 2021               Content Version: 13.2  © 2006-2022 [employer]. Care instructions adapted under license by [employer] (which disclaims liability or warranty for this information). If you have questions about a medical condition or this instruction, always ask your healthcare professional. [place] [address] any warranty or liability for your use of this information.

## 2022-04-13 LAB
ANION GAP SERPL CALC-SCNC: 8 MMOL/L (ref 5–15)
BUN SERPL-MCNC: 16 MG/DL (ref 6–20)
BUN/CREAT SERPL: 13 (ref 12–20)
CALCIUM SERPL-MCNC: 9.5 MG/DL (ref 8.5–10.1)
CHLORIDE SERPL-SCNC: 99 MMOL/L (ref 97–108)
CO2 SERPL-SCNC: 33 MMOL/L (ref 21–32)
CREAT SERPL-MCNC: 1.28 MG/DL (ref 0.7–1.3)
GLUCOSE SERPL-MCNC: 85 MG/DL (ref 65–100)
POTASSIUM SERPL-SCNC: 3.2 MMOL/L (ref 3.5–5.1)
SODIUM SERPL-SCNC: 140 MMOL/L (ref 136–145)

## 2022-06-13 ENCOUNTER — OFFICE VISIT (OUTPATIENT)
Dept: FAMILY MEDICINE CLINIC | Age: 77
End: 2022-06-13
Payer: MEDICARE

## 2022-06-13 VITALS
TEMPERATURE: 98.9 F | DIASTOLIC BLOOD PRESSURE: 87 MMHG | WEIGHT: 191.8 LBS | BODY MASS INDEX: 25.42 KG/M2 | SYSTOLIC BLOOD PRESSURE: 144 MMHG | OXYGEN SATURATION: 97 % | HEIGHT: 73 IN | HEART RATE: 89 BPM | RESPIRATION RATE: 17 BRPM

## 2022-06-13 DIAGNOSIS — N18.30 STAGE 3 CHRONIC KIDNEY DISEASE, UNSPECIFIED WHETHER STAGE 3A OR 3B CKD (HCC): ICD-10-CM

## 2022-06-13 DIAGNOSIS — I10 HYPERTENSION, UNSPECIFIED TYPE: ICD-10-CM

## 2022-06-13 DIAGNOSIS — E53.8 VITAMIN B12 DEFICIENCY NEUROPATHY (HCC): ICD-10-CM

## 2022-06-13 DIAGNOSIS — E87.6 HYPOKALEMIA: ICD-10-CM

## 2022-06-13 DIAGNOSIS — R60.0 LOCALIZED EDEMA: Primary | ICD-10-CM

## 2022-06-13 DIAGNOSIS — G63 VITAMIN B12 DEFICIENCY NEUROPATHY (HCC): ICD-10-CM

## 2022-06-13 LAB
ANION GAP SERPL CALC-SCNC: 3 MMOL/L (ref 5–15)
BUN SERPL-MCNC: 9 MG/DL (ref 6–20)
BUN/CREAT SERPL: 9 (ref 12–20)
CALCIUM SERPL-MCNC: 9.1 MG/DL (ref 8.5–10.1)
CHLORIDE SERPL-SCNC: 107 MMOL/L (ref 97–108)
CO2 SERPL-SCNC: 30 MMOL/L (ref 21–32)
CREAT SERPL-MCNC: 1.04 MG/DL (ref 0.7–1.3)
GLUCOSE SERPL-MCNC: 102 MG/DL (ref 65–100)
POTASSIUM SERPL-SCNC: 3.6 MMOL/L (ref 3.5–5.1)
SODIUM SERPL-SCNC: 140 MMOL/L (ref 136–145)

## 2022-06-13 PROCEDURE — 3017F COLORECTAL CA SCREEN DOC REV: CPT | Performed by: FAMILY MEDICINE

## 2022-06-13 PROCEDURE — 1101F PT FALLS ASSESS-DOCD LE1/YR: CPT | Performed by: FAMILY MEDICINE

## 2022-06-13 PROCEDURE — G0463 HOSPITAL OUTPT CLINIC VISIT: HCPCS | Performed by: FAMILY MEDICINE

## 2022-06-13 PROCEDURE — G8536 NO DOC ELDER MAL SCRN: HCPCS | Performed by: FAMILY MEDICINE

## 2022-06-13 PROCEDURE — G8754 DIAS BP LESS 90: HCPCS | Performed by: FAMILY MEDICINE

## 2022-06-13 PROCEDURE — G8753 SYS BP > OR = 140: HCPCS | Performed by: FAMILY MEDICINE

## 2022-06-13 PROCEDURE — 1123F ACP DISCUSS/DSCN MKR DOCD: CPT | Performed by: FAMILY MEDICINE

## 2022-06-13 PROCEDURE — G8510 SCR DEP NEG, NO PLAN REQD: HCPCS | Performed by: FAMILY MEDICINE

## 2022-06-13 PROCEDURE — G8427 DOCREV CUR MEDS BY ELIG CLIN: HCPCS | Performed by: FAMILY MEDICINE

## 2022-06-13 PROCEDURE — G8417 CALC BMI ABV UP PARAM F/U: HCPCS | Performed by: FAMILY MEDICINE

## 2022-06-13 PROCEDURE — 99214 OFFICE O/P EST MOD 30 MIN: CPT | Performed by: FAMILY MEDICINE

## 2022-06-13 RX ORDER — ZOSTER VACCINE RECOMBINANT, ADJUVANTED 50 MCG/0.5
0.5 KIT INTRAMUSCULAR ONCE
Qty: 0.5 ML | Refills: 0 | Status: SHIPPED | OUTPATIENT
Start: 2022-06-13 | End: 2022-06-13

## 2022-06-13 RX ORDER — TORSEMIDE 100 MG/1
100 TABLET ORAL DAILY
Qty: 30 TABLET | Refills: 2 | Status: SHIPPED | OUTPATIENT
Start: 2022-06-13 | End: 2022-09-19 | Stop reason: SDUPTHER

## 2022-06-13 NOTE — PROGRESS NOTES
Chief Complaint   Patient presents with    Follow-up     HTN       1. \"Have you been to the ER, urgent care clinic since your last visit? Hospitalized since your last visit? \" No    2. \"Have you seen or consulted any other health care providers outside of the [address] [place] [address] since your last visit? \" No     3. For patients aged 39-70: Has the patient had a colonoscopy / FIT/ Cologuard? No      If the patient is female:    4. For patients aged 41-77: Has the patient had a mammogram within the past 2 years? NA - based on age or sex      11. For patients aged 21-65: Has the patient had a pap smear?  NA - based on age or sex    Health Maintenance Due   Topic Date Due    Shingrix Vaccine Age 49> (1 of 2) Never done    Colorectal Cancer Screening Combo  02/05/2020

## 2022-06-13 NOTE — PROGRESS NOTES
HISTORY OF PRESENT ILLNESS  Ga Serrano is a 78 y.o. male,  present for Bp check and the patient stating that so far there has been a Compliancy w/ the bp meds, having had the low salt diet and try to the best of pt's knowledge to avoid smoked meats,   the patient has been active life style and does do the daily walking, in addition pt states that patien does not obtain the bp at home    today the pt denies Chest Pain, has +++ legs swelling no lightheadedness,    Does a lot of walking and standing at his job ,the swelling goes down at night, seen the cardio with nl work up,     · LV: Estimated LVEF is 55 - 60%. Visually measured ejection fraction. Normal cavity size, wall thickness, systolic function (ejection fraction normal) and diastolic function. Wall motion: normal.      Not compliant with supprt hose hard to put on  Hx of recurrent Hypokalemia  Currently on klor con , he patient is not having a potassium rich diet, small amount of muscle ache, patient does not drink a lot of water, does not vomit daily,    the pat has not been feeling anxious, and  Has not been feeling stressed out, otherwise feeling better since the last visit       Current Outpatient Medications   Medication Sig Dispense Refill    cholecalciferol (Vitamin D3) 25 mcg (1,000 unit) cap One tab daily 90 Capsule 3    torsemide (DEMADEX) 100 mg tablet Take 1 Tablet by mouth daily. 30 Tablet 2    potassium chloride (K-DUR, KLOR-CON M20) 20 mEq tablet Take 1 Tablet by mouth two (2) times a day. Indications: prevention of low potassium in the blood 180 Tablet 1    triamcinolone acetonide (KENALOG) 0.1 % topical cream Apply  to affected area two (2) times a day. use thin layer (Patient taking differently: Apply  to affected area two (2) times daily as needed. use thin layer) 80 g 1    cyanocobalamin (Vitamin B-12) 1,000 mcg tablet Take 1 Tab by mouth daily. 30 Tab 6    rosuvastatin (CRESTOR) 5 mg tablet Take 1 Tab by mouth nightly.  30 Tab 6    aspirin delayed-release 81 mg tablet Take  by mouth daily.  fluticasone propionate (FLONASE) 50 mcg/actuation nasal spray 1 [place] by Both Nostrils route daily. Indications: inflammation of the nose due to an allergy (Patient not taking: Reported on 4/12/2022) 1 Bottle 5     No Known Allergies  Past Medical History:   Diagnosis Date    Elevated PSA, less than 10 ng/ml 12/10/2012    Hyperlipidemia     Hypertension     Hypokalemia 7/25/2014    Localized edema 2/7/2017    Long term current use of anticoagulant therapy     Patient is full code 9/18/2018    Syncope     Vitamin B12 deficiency neuropathy ([facility] [address] 6/18/2019    Vitamin D deficiency 7/8/2014    WBC decreased 7/6/2012     Past Surgical History:   Procedure Laterality Date    COLONOSCOPY,DIAGNOSTIC  2/5/2015         HX COLONOSCOPY      HX ORTHOPAEDIC      left knee surg in  1984     Family History   Problem Relation Age of Onset    Kidney Disease Father     Hypertension Mother      Social History     Tobacco Use    Smoking status: Never Smoker    Smokeless tobacco: Never Used   Substance Use Topics    Alcohol use: No      Lab Results   Component Value Date/Time    Hemoglobin A1c 6.0 (H) 01/18/2022 10:44 AM    Hemoglobin A1c 5.8 (H) 10/19/2021 10:50 AM    Hemoglobin A1c 6.5 (H) 01/13/2016 09:03 AM    Glucose 85 04/12/2022 09:50 AM    LDL, calculated 47.8 01/18/2022 10:44 AM    Creatinine 1.28 04/12/2022 09:50 AM      Lab Results   Component Value Date/Time    Cholesterol, total 128 01/18/2022 10:44 AM    HDL Cholesterol 71 01/18/2022 10:44 AM    LDL, calculated 47.8 01/18/2022 10:44 AM    Triglyceride 46 01/18/2022 10:44 AM    CHOL/HDL Ratio 1.8 01/18/2022 10:44 AM        Review of Systems   Constitutional: Negative for chills and fever. HENT: Negative for congestion and nosebleeds. Eyes: Negative for blurred vision and pain. Respiratory: Negative for cough, shortness of breath and wheezing.     Cardiovascular: Positive for leg swelling. Negative for chest pain. Gastrointestinal: Negative for constipation, diarrhea, nausea and vomiting. Genitourinary: Negative for dysuria and frequency. Musculoskeletal: Negative for joint pain and myalgias. Skin: Negative for itching and rash. Neurological: Negative for dizziness, loss of consciousness and headaches. Psychiatric/Behavioral: Negative for depression. The patient is not nervous/anxious and does not have insomnia. Physical Exam  Vitals and nursing note reviewed. Constitutional:       Appearance: He is well-developed. HENT:      Head: Normocephalic and atraumatic. Mouth/Throat:      Pharynx: No oropharyngeal exudate. Eyes:      Conjunctiva/sclera: Conjunctivae normal.      Pupils: Pupils are equal, round, and reactive to light. Neck:      Thyroid: No thyromegaly. Vascular: No JVD. Cardiovascular:      Rate and Rhythm: Normal rate and regular rhythm. Heart sounds: Normal heart sounds. No murmur heard. No friction rub. Pulmonary:      Effort: Pulmonary effort is normal. No respiratory distress. Breath sounds: Normal breath sounds. No wheezing or rales. Abdominal:      General: Bowel sounds are normal. There is no distension. Palpations: Abdomen is soft. Tenderness: There is no abdominal tenderness. Musculoskeletal:         General: No tenderness. Cervical back: Normal range of motion and neck supple. Lymphadenopathy:      Cervical: No cervical adenopathy. Skin:     General: Skin is warm. Findings: No erythema or rash. Neurological:      Mental Status: He is alert and oriented to person, place, and time. Deep Tendon Reflexes: Reflexes are normal and symmetric. Psychiatric:         Behavior: Behavior normal.         ASSESSMENT and PLAN  Diagnoses and all orders for this visit:    1.  Localized edema  -     varicella-zoster recombinant, PF, (Shingrix, PF,) 50 mcg/0.5 mL susr injection; 0.5 mL by IntraMUSCular route once for 1 dose. -     METABOLIC PANEL, BASIC; Future  -     torsemide (DEMADEX) 100 mg tablet; Take 1 Tablet by mouth daily. -     potassium chloride (K-DUR, KLOR-CON M20) 20 mEq tablet; Take 1 Tablet by mouth two (2) times a day. Indications: prevention of low potassium in the blood    2. Stage 3 chronic kidney disease, unspecified whether stage 3a or 3b CKD (HCC)  -     potassium chloride (K-DUR, KLOR-CON M20) 20 mEq tablet; Take 1 Tablet by mouth two (2) times a day. Indications: prevention of low potassium in the blood    3. Hypertension, unspecified type  -     torsemide (DEMADEX) 100 mg tablet; Take 1 Tablet by mouth daily. -     potassium chloride (K-DUR, KLOR-CON M20) 20 mEq tablet; Take 1 Tablet by mouth two (2) times a day. Indications: prevention of low potassium in the blood    4. Vitamin B12 deficiency neuropathy (HCC)  -     torsemide (DEMADEX) 100 mg tablet; Take 1 Tablet by mouth daily. -     potassium chloride (K-DUR, KLOR-CON M20) 20 mEq tablet; Take 1 Tablet by mouth two (2) times a day. Indications: prevention of low potassium in the blood    5. Hypokalemia  -     potassium chloride (K-DUR, KLOR-CON M20) 20 mEq tablet; Take 1 Tablet by mouth two (2) times a day.  Indications: prevention of low potassium in the blood    lab results r/w'd stable and improved K level cont with current tx, support hose and leg elevation

## 2022-06-19 RX ORDER — POTASSIUM CHLORIDE 20 MEQ/1
20 TABLET, EXTENDED RELEASE ORAL 2 TIMES DAILY
Qty: 180 TABLET | Refills: 1 | Status: SHIPPED | OUTPATIENT
Start: 2022-06-19 | End: 2022-09-19 | Stop reason: SDUPTHER

## 2022-09-19 ENCOUNTER — OFFICE VISIT (OUTPATIENT)
Dept: FAMILY MEDICINE CLINIC | Age: 77
End: 2022-09-19
Payer: MEDICARE

## 2022-09-19 VITALS
BODY MASS INDEX: 25.33 KG/M2 | WEIGHT: 192 LBS | OXYGEN SATURATION: 98 % | HEART RATE: 64 BPM | TEMPERATURE: 97.8 F | DIASTOLIC BLOOD PRESSURE: 93 MMHG | RESPIRATION RATE: 16 BRPM | SYSTOLIC BLOOD PRESSURE: 151 MMHG

## 2022-09-19 DIAGNOSIS — I10 HYPERTENSION, UNSPECIFIED TYPE: ICD-10-CM

## 2022-09-19 DIAGNOSIS — R60.0 LOCALIZED EDEMA: ICD-10-CM

## 2022-09-19 DIAGNOSIS — G63 VITAMIN B12 DEFICIENCY NEUROPATHY (HCC): ICD-10-CM

## 2022-09-19 DIAGNOSIS — E53.8 VITAMIN B12 DEFICIENCY NEUROPATHY (HCC): ICD-10-CM

## 2022-09-19 DIAGNOSIS — N18.30 STAGE 3 CHRONIC KIDNEY DISEASE, UNSPECIFIED WHETHER STAGE 3A OR 3B CKD (HCC): ICD-10-CM

## 2022-09-19 DIAGNOSIS — E87.6 HYPOKALEMIA: ICD-10-CM

## 2022-09-19 LAB
ANION GAP SERPL CALC-SCNC: 5 MMOL/L (ref 5–15)
BUN SERPL-MCNC: 8 MG/DL (ref 6–20)
BUN/CREAT SERPL: 7 (ref 12–20)
CALCIUM SERPL-MCNC: 8.8 MG/DL (ref 8.5–10.1)
CHLORIDE SERPL-SCNC: 106 MMOL/L (ref 97–108)
CO2 SERPL-SCNC: 30 MMOL/L (ref 21–32)
CREAT SERPL-MCNC: 1.12 MG/DL (ref 0.7–1.3)
GLUCOSE SERPL-MCNC: 104 MG/DL (ref 65–100)
POTASSIUM SERPL-SCNC: 4 MMOL/L (ref 3.5–5.1)
SODIUM SERPL-SCNC: 141 MMOL/L (ref 136–145)

## 2022-09-19 PROCEDURE — G8427 DOCREV CUR MEDS BY ELIG CLIN: HCPCS | Performed by: FAMILY MEDICINE

## 2022-09-19 PROCEDURE — 1123F ACP DISCUSS/DSCN MKR DOCD: CPT | Performed by: FAMILY MEDICINE

## 2022-09-19 PROCEDURE — 1101F PT FALLS ASSESS-DOCD LE1/YR: CPT | Performed by: FAMILY MEDICINE

## 2022-09-19 PROCEDURE — G8753 SYS BP > OR = 140: HCPCS | Performed by: FAMILY MEDICINE

## 2022-09-19 PROCEDURE — G8754 DIAS BP LESS 90: HCPCS | Performed by: FAMILY MEDICINE

## 2022-09-19 PROCEDURE — 99213 OFFICE O/P EST LOW 20 MIN: CPT | Performed by: FAMILY MEDICINE

## 2022-09-19 PROCEDURE — G8536 NO DOC ELDER MAL SCRN: HCPCS | Performed by: FAMILY MEDICINE

## 2022-09-19 PROCEDURE — G8432 DEP SCR NOT DOC, RNG: HCPCS | Performed by: FAMILY MEDICINE

## 2022-09-19 PROCEDURE — G0463 HOSPITAL OUTPT CLINIC VISIT: HCPCS | Performed by: FAMILY MEDICINE

## 2022-09-19 PROCEDURE — 3017F COLORECTAL CA SCREEN DOC REV: CPT | Performed by: FAMILY MEDICINE

## 2022-09-19 PROCEDURE — G8417 CALC BMI ABV UP PARAM F/U: HCPCS | Performed by: FAMILY MEDICINE

## 2022-09-19 RX ORDER — POTASSIUM CHLORIDE 20 MEQ/1
20 TABLET, EXTENDED RELEASE ORAL 2 TIMES DAILY
Qty: 180 TABLET | Refills: 1
Start: 2022-09-19

## 2022-09-19 RX ORDER — METOLAZONE 5 MG/1
5 TABLET ORAL DAILY
Qty: 30 TABLET | Refills: 4 | Status: SHIPPED | OUTPATIENT
Start: 2022-09-19 | End: 2022-11-02 | Stop reason: ALTCHOICE

## 2022-09-19 RX ORDER — METOLAZONE 5 MG/1
5 TABLET ORAL DAILY
Qty: 30 TABLET | Refills: 4 | Status: SHIPPED | OUTPATIENT
Start: 2022-09-19 | End: 2022-09-19 | Stop reason: SDUPTHER

## 2022-09-19 RX ORDER — TORSEMIDE 100 MG/1
100 TABLET ORAL DAILY
Qty: 30 TABLET | Refills: 2 | Status: SHIPPED | OUTPATIENT
Start: 2022-09-19 | End: 2022-11-02 | Stop reason: ALTCHOICE

## 2022-09-19 NOTE — PROGRESS NOTES
HISTORY OF PRESENT ILLNESS  Suhail Dumont is a 78 y.o. male,  present for Bp check , compliant w/ the bp meds, a low salt diet, an  active life style, patient does obtain the bp at home and the average of <140/90, today the pt denies Chest Pain, has no legs swelling no lightheadedness,the pat has not been feeling anxious, and  Has not been feeling stressed out, otherwise feeling better since the last visit   Current Outpatient Medications   Medication Sig Dispense Refill    potassium chloride (K-DUR, KLOR-CON M20) 20 mEq tablet Take 1 Tablet by mouth two (2) times a day. Indications: prevention of low potassium in the blood 180 Tablet 1    torsemide (DEMADEX) 100 mg tablet Take 1 Tablet by mouth daily. 30 Tablet 2    cholecalciferol (Vitamin D3) 25 mcg (1,000 unit) cap One tab daily 90 Capsule 3    cyanocobalamin (Vitamin B-12) 1,000 mcg tablet Take 1 Tab by mouth daily. 30 Tab 6    rosuvastatin (CRESTOR) 5 mg tablet Take 1 Tab by mouth nightly. 30 Tab 6    aspirin delayed-release 81 mg tablet Take  by mouth daily. triamcinolone acetonide (KENALOG) 0.1 % topical cream Apply  to affected area two (2) times a day. use thin layer (Patient not taking: Reported on 9/19/2022) 80 g 1    fluticasone propionate (FLONASE) 50 mcg/actuation nasal spray 1 [place] by Both Nostrils route daily.  Indications: inflammation of the nose due to an allergy (Patient not taking: No sig reported) 1 Bottle 5     No Known Allergies  Past Medical History:   Diagnosis Date    Elevated PSA, less than 10 ng/ml 12/10/2012    Hyperlipidemia     Hypertension     Hypokalemia 7/25/2014    Localized edema 2/7/2017    Long term current use of anticoagulant therapy     Patient is full code 9/18/2018    Syncope     Vitamin B12 deficiency neuropathy ([facility] [address] 6/18/2019    Vitamin D deficiency 7/8/2014    WBC decreased 7/6/2012     Past Surgical History:   Procedure Laterality Date    COLONOSCOPY,DIAGNOSTIC  2/5/2015         HX COLONOSCOPY      HX ORTHOPAEDIC left knee surg in  1984     Family History   Problem Relation Age of Onset    Kidney Disease Father     Hypertension Mother      Social History     Tobacco Use    Smoking status: Never    Smokeless tobacco: Never   Substance Use Topics    Alcohol use: No      Lab Results   Component Value Date/Time    Cholesterol, total 128 01/18/2022 10:44 AM    HDL Cholesterol 71 01/18/2022 10:44 AM    LDL, calculated 47.8 01/18/2022 10:44 AM    Triglyceride 46 01/18/2022 10:44 AM    CHOL/HDL Ratio 1.8 01/18/2022 10:44 AM     Lab Results   Component Value Date/Time    ALT (SGPT) 19 01/18/2022 10:44 AM    Alk. phosphatase 97 01/18/2022 10:44 AM    Bilirubin, total 0.5 01/18/2022 10:44 AM    Albumin 3.8 01/18/2022 10:44 AM    Protein, total 7.1 01/18/2022 10:44 AM    PLATELET 692 (L) 24/85/7763 10:44 AM        Review of Systems   Constitutional:  Negative for chills and fever. HENT:  Negative for congestion and nosebleeds. Eyes:  Negative for blurred vision and pain. Respiratory:  Negative for cough, shortness of breath and wheezing. Cardiovascular:  Positive for leg swelling. Negative for chest pain. Gastrointestinal:  Negative for constipation, diarrhea, nausea and vomiting. Genitourinary:  Negative for dysuria and frequency. Musculoskeletal:  Negative for joint pain and myalgias. Skin:  Negative for itching and rash. Neurological:  Negative for dizziness, loss of consciousness and headaches. Psychiatric/Behavioral:  Negative for depression. The patient is not nervous/anxious and does not have insomnia. Physical Exam  Vitals and nursing note reviewed. Constitutional:       Appearance: He is well-developed. HENT:      Head: Normocephalic and atraumatic. Mouth/Throat:      Pharynx: No oropharyngeal exudate. Eyes:      Conjunctiva/sclera: Conjunctivae normal.      Pupils: Pupils are equal, round, and reactive to light. Neck:      Thyroid: No thyromegaly. Vascular: No JVD. Cardiovascular:      Rate and Rhythm: Normal rate and regular rhythm. Heart sounds: Normal heart sounds. No murmur heard. No friction rub. Pulmonary:      Effort: Pulmonary effort is normal. No respiratory distress. Breath sounds: Normal breath sounds. No wheezing or rales. Abdominal:      General: Bowel sounds are normal. There is no distension. Palpations: Abdomen is soft. Tenderness: There is no abdominal tenderness. Musculoskeletal:         General: No tenderness. Cervical back: Normal range of motion and neck supple. Right lower leg: Edema present. Left lower leg: Edema present. Lymphadenopathy:      Cervical: No cervical adenopathy. Skin:     General: Skin is warm. Findings: No erythema or rash. Neurological:      Mental Status: He is alert and oriented to person, place, and time. Deep Tendon Reflexes: Reflexes are normal and symmetric. Psychiatric:         Behavior: Behavior normal.       ASSESSMENT and PLAN    ICD-10-CM ICD-9-CM    1. Localized edema  R60.0 782. 3 potassium chloride (K-DUR, KLOR-CON M20) 20 mEq tablet      torsemide (DEMADEX) 100 mg tablet      METABOLIC PANEL, BASIC      metOLazone (ZAROXOLYN) 5 mg tablet      DISCONTINUED: metOLazone (ZAROXOLYN) 5 mg tablet      2. Stage 3 chronic kidney disease, unspecified whether stage 3a or 3b CKD ([facility] [address]  N18.30 585. 3 potassium chloride (K-DUR, KLOR-CON M20) 20 mEq tablet      METABOLIC PANEL, BASIC      metOLazone (ZAROXOLYN) 5 mg tablet      DISCONTINUED: metOLazone (ZAROXOLYN) 5 mg tablet      3. Hypertension, unspecified type  I10 401.9 potassium chloride (K-DUR, KLOR-CON M20) 20 mEq tablet      torsemide (DEMADEX) 100 mg tablet      METABOLIC PANEL, BASIC      metOLazone (ZAROXOLYN) 5 mg tablet      DISCONTINUED: metOLazone (ZAROXOLYN) 5 mg tablet      4.  Vitamin B12 deficiency neuropathy (HCC)  E53.8 266.2 potassium chloride (K-DUR, KLOR-CON M20) 20 mEq tablet    G63 357.4 torsemide (DEMADEX) 100 mg tablet      METABOLIC PANEL, BASIC      metOLazone (ZAROXOLYN) 5 mg tablet      DISCONTINUED: metOLazone (ZAROXOLYN) 5 mg tablet      5.  Hypokalemia  E87.6 276.8 potassium chloride (K-DUR, KLOR-CON M20) 20 mEq tablet      METABOLIC PANEL, BASIC      metOLazone (ZAROXOLYN) 5 mg tablet      DISCONTINUED: metOLazone (ZAROXOLYN) 5 mg tablet        lab results and schedule of future lab studies reviewed with patient  reviewed medications and side effects in detail

## 2022-09-19 NOTE — PROGRESS NOTES
Chief Complaint   Patient presents with    Hypertension     1. Have you been to the ER, urgent care clinic since your last visit? Hospitalized since your last visit? No    2. Have you seen or consulted any other health care providers outside of the [address] [place] [address] since your last visit? Include any pap smears or colon screening. No  Health Maintenance   Topic Date Due    Shingrix Vaccine Age 49> (1 of 2) Never done    Colorectal Cancer Screening Combo  02/05/2020    COVID-19 Vaccine (4 - Booster for Moderna series) 05/18/2022    Flu Vaccine (1) 09/01/2022    A1C test (Diabetic or Prediabetic)  01/18/2023    Lipid Screen  01/18/2023    Medicare Yearly Exam  01/19/2023    Depression Screen  06/13/2023    DTaP/Tdap/Td series (2 - Td or Tdap) 07/08/2024    Hepatitis C Screening  Completed    Pneumococcal 65+ years  Completed     Verified patient with two type of identifiers.  denies c/o at present  Declines flu vaccine

## 2022-11-02 ENCOUNTER — OFFICE VISIT (OUTPATIENT)
Dept: FAMILY MEDICINE CLINIC | Age: 77
End: 2022-11-02
Payer: MEDICARE

## 2022-11-02 VITALS
SYSTOLIC BLOOD PRESSURE: 118 MMHG | HEIGHT: 73 IN | WEIGHT: 178.8 LBS | BODY MASS INDEX: 23.7 KG/M2 | RESPIRATION RATE: 16 BRPM | TEMPERATURE: 98.6 F | OXYGEN SATURATION: 96 % | HEART RATE: 98 BPM | DIASTOLIC BLOOD PRESSURE: 88 MMHG

## 2022-11-02 DIAGNOSIS — Z09 HOSPITAL DISCHARGE FOLLOW-UP: ICD-10-CM

## 2022-11-02 DIAGNOSIS — Z71.89 ADVANCED DIRECTIVES, COUNSELING/DISCUSSION: ICD-10-CM
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
## 2022-11-02 PROCEDURE — G8427 DOCREV CUR MEDS BY ELIG CLIN: HCPCS | Performed by: FAMILY MEDICINE

## 2022-11-02 PROCEDURE — 99215 OFFICE O/P EST HI 40 MIN: CPT | Performed by: FAMILY MEDICINE

## 2022-11-02 PROCEDURE — 1111F DSCHRG MED/CURRENT MED MERGE: CPT | Performed by: FAMILY MEDICINE

## 2022-11-02 PROCEDURE — 99497 ADVNCD CARE PLAN 30 MIN: CPT | Performed by: FAMILY MEDICINE

## 2022-11-02 NOTE — LETTER
NOTIFICATION RETURN TO WORK / SCHOOL      11/2/2022 1:56 PM    Mr. Olivia Posadas [phone]      To Whom It May Concern:    Marilia Macias is currently under the care of [address] [place] [address] OFFICE-[facility]. He will return to work/school on: 11/28/2022    If there are questions or concerns please have the patient contact our office.         Sincerely,      Maria G Heaton MD
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
## 2022-11-03 ENCOUNTER — TELEPHONE (OUTPATIENT)
Dept: FAMILY MEDICINE CLINIC | Age: 77
End: 2022-11-03

## 2022-11-03 NOTE — TELEPHONE ENCOUNTER
----- Message from Khadar Garcia sent at 11/3/2022  3:05 PM EDT -----  Subject: Message to Provider    QUESTIONS  Information for Provider? Calling to request a verbal to continue his   physical therapy. The verbal will go to the contact in other at the bottom   Joelle  ---------------------------------------------------------------------------  --------------  [id number]  [phone]; OK to leave message on voicemail  ---------------------------------------------------------------------------  --------------  SCRIPT ANSWERS  Relationship to Patient? Third Party  Third Party Type? Physical Therapy Office? Representative Name?  Joelle

## 2022-11-03 NOTE — TELEPHONE ENCOUNTER
Call placed to [facility] [id number] [facility]  Verified patient with two type of identifiers.   approved to continue physical therapy per Verbal Order from Dr. Uriah Cheney on 11/3/2022
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
## 2022-11-14 NOTE — ACP (ADVANCE CARE PLANNING)
Advance Care Planning     General Advance Care Planning (ACP) Conversation      Date of Conversation: 11/2/2022          Conversation Conducted with:   Patient with Decision Making Capacity, stating that the Other Legally Authorized Decision Maker would be Ms.  Anabel Criss friend as SDM          Patient is on presence of no family member,, stated that the pt wants to be not DNR at this time,  The pt likes to be a full code individual,  The patient states that there is a lot of will to live,      Conversation Outcomes / Follow-Up Plan:   Completed new Advance Directive      Length of ACP Conversation in minutes:  12 minutes        Priti Segundo MD

## 2022-11-25 ENCOUNTER — OFFICE VISIT (OUTPATIENT)
Dept: FAMILY MEDICINE CLINIC | Age: 77
End: 2022-11-25
Payer: MEDICARE

## 2022-11-25 VITALS
WEIGHT: 173 LBS | TEMPERATURE: 98.4 F | DIASTOLIC BLOOD PRESSURE: 74 MMHG | BODY MASS INDEX: 22.93 KG/M2 | HEIGHT: 73 IN | OXYGEN SATURATION: 95 % | HEART RATE: 97 BPM | RESPIRATION RATE: 15 BRPM | SYSTOLIC BLOOD PRESSURE: 112 MMHG

## 2022-11-25 DIAGNOSIS — M54.9 ACUTE UPPER BACK PAIN: ICD-10-CM

## 2022-11-25 DIAGNOSIS — S13.4XXD WHIPLASH INJURIES, SUBSEQUENT ENCOUNTER: ICD-10-CM

## 2022-11-25 DIAGNOSIS — M25.511 ACUTE PAIN OF RIGHT SHOULDER: ICD-10-CM
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
## 2022-11-25 DIAGNOSIS — M54.2 NECK PAIN: Primary | ICD-10-CM

## 2022-11-25 DIAGNOSIS — M54.50 LOW BACK PAIN AT MULTIPLE SITES: ICD-10-CM

## 2022-11-25 PROCEDURE — G8510 SCR DEP NEG, NO PLAN REQD: HCPCS | Performed by: FAMILY MEDICINE

## 2022-11-25 PROCEDURE — G8754 DIAS BP LESS 90: HCPCS | Performed by: FAMILY MEDICINE

## 2022-11-25 PROCEDURE — 99214 OFFICE O/P EST MOD 30 MIN: CPT | Performed by: FAMILY MEDICINE

## 2022-11-25 PROCEDURE — G8752 SYS BP LESS 140: HCPCS | Performed by: FAMILY MEDICINE

## 2022-11-25 PROCEDURE — 1101F PT FALLS ASSESS-DOCD LE1/YR: CPT | Performed by: FAMILY MEDICINE

## 2022-11-25 PROCEDURE — 3017F COLORECTAL CA SCREEN DOC REV: CPT | Performed by: FAMILY MEDICINE

## 2022-11-25 PROCEDURE — 3078F DIAST BP <80 MM HG: CPT | Performed by: FAMILY MEDICINE

## 2022-11-25 PROCEDURE — 3074F SYST BP LT 130 MM HG: CPT | Performed by: FAMILY MEDICINE

## 2022-11-25 PROCEDURE — G8427 DOCREV CUR MEDS BY ELIG CLIN: HCPCS | Performed by: FAMILY MEDICINE

## 2022-11-25 PROCEDURE — 1123F ACP DISCUSS/DSCN MKR DOCD: CPT | Performed by: FAMILY MEDICINE

## 2022-11-25 PROCEDURE — G0463 HOSPITAL OUTPT CLINIC VISIT: HCPCS | Performed by: FAMILY MEDICINE

## 2022-11-25 PROCEDURE — G8420 CALC BMI NORM PARAMETERS: HCPCS | Performed by: FAMILY MEDICINE

## 2022-11-25 PROCEDURE — G8536 NO DOC ELDER MAL SCRN: HCPCS | Performed by: FAMILY MEDICINE

## 2022-11-25 RX ORDER — LIDOCAINE 50 MG/G
PATCH TOPICAL
Qty: 20 EACH | Refills: 1 | Status: SHIPPED | OUTPATIENT
Start: 2022-11-25

## 2022-11-25 RX ORDER — DEXTROMETHORPHAN HYDROBROMIDE, GUAIFENESIN 5; 100 MG/5ML; MG/5ML
650 LIQUID ORAL EVERY 8 HOURS
Qty: 40 TABLET | Refills: 0 | Status: SHIPPED | OUTPATIENT
Start: 2022-11-25

## 2022-11-25 RX ORDER — METHYLPREDNISOLONE 4 MG/1
TABLET ORAL
Qty: 1 DOSE PACK | Refills: 0 | Status: SHIPPED | OUTPATIENT
Start: 2022-11-25

## 2022-11-25 RX ORDER — DICLOFENAC SODIUM 75 MG/1
75 TABLET, DELAYED RELEASE ORAL 2 TIMES DAILY
Qty: 18 TABLET | Refills: 0 | Status: SHIPPED | OUTPATIENT
Start: 2022-11-25
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
## 2022-11-30 ENCOUNTER — DOCUMENTATION ONLY (OUTPATIENT)
Dept: FAMILY MEDICINE CLINIC | Age: 77
End: 2022-11-30

## 2022-12-09 ENCOUNTER — OFFICE VISIT (OUTPATIENT)
Dept: FAMILY MEDICINE CLINIC | Age: 77
End: 2022-12-09
Payer: MEDICARE

## 2022-12-09 VITALS
SYSTOLIC BLOOD PRESSURE: 132 MMHG | HEART RATE: 98 BPM | OXYGEN SATURATION: 98 % | HEIGHT: 73 IN | TEMPERATURE: 98 F | DIASTOLIC BLOOD PRESSURE: 80 MMHG | WEIGHT: 184.6 LBS | BODY MASS INDEX: 24.46 KG/M2

## 2022-12-09 DIAGNOSIS — M25.511 CHRONIC RIGHT SHOULDER PAIN: Primary | ICD-10-CM

## 2022-12-09 DIAGNOSIS — R60.0 LOCALIZED EDEMA: ICD-10-CM

## 2022-12-09 DIAGNOSIS — Z02.89 ENCOUNTER TO OBTAIN EXCUSE FROM WORK: ICD-10-CM

## 2022-12-09 DIAGNOSIS — G89.29 CHRONIC RIGHT SHOULDER PAIN: Primary | ICD-10-CM

## 2022-12-09 RX ORDER — POTASSIUM CHLORIDE 20 MEQ/1
20 TABLET, EXTENDED RELEASE ORAL 2 TIMES DAILY
Qty: 30 TABLET | Refills: 1 | Status: SHIPPED | OUTPATIENT
Start: 2022-12-09

## 2022-12-09 RX ORDER — FUROSEMIDE 20 MG/1
20 TABLET ORAL DAILY
Qty: 30 TABLET | Refills: 2 | Status: SHIPPED | OUTPATIENT
Start: 2022-12-09

## 2022-12-09 NOTE — LETTER
NOTIFICATION RETURN TO WORK / SCHOOL    12/9/2022 11:04 AM    Mr. Ousmane Whipple [phone]      To Whom It May Concern:    Lauren De Jesus is currently under the care of [address] [place] [address] OFFICE-[facility]. He will return to work/school on: 12/19/2022 due to the current illness. If there are questions or concerns please have the patient contact our office.       Sincerely,      Vic Thompson MD

## 2022-12-09 NOTE — PROGRESS NOTES
Chief Complaint   Patient presents with    Follow-up     2 week follow up     1. \"Have you been to the ER, urgent care clinic since your last visit? Hospitalized since your last visit? \" No    2. \"Have you seen or consulted any other health care providers outside of the [address] [place] [address] since your last visit? \" No     3. For patients aged 39-70: Has the patient had a colonoscopy / FIT/ Cologuard? Yes - Care Gap present. Rooming MA/LPN to request most recent results      If the patient is female:    4. For patients aged 41-77: Has the patient had a mammogram within the past 2 years? NA - based on age or sex      11. For patients aged 21-65: Has the patient had a pap smear?  NA - based on age or sex

## 2022-12-22 ENCOUNTER — OFFICE VISIT (OUTPATIENT)
Dept: FAMILY MEDICINE CLINIC | Age: 77
End: 2022-12-22
Payer: MEDICARE

## 2022-12-22 VITALS
TEMPERATURE: 98.5 F | SYSTOLIC BLOOD PRESSURE: 127 MMHG | RESPIRATION RATE: 17 BRPM | HEIGHT: 78 IN | DIASTOLIC BLOOD PRESSURE: 82 MMHG | HEART RATE: 99 BPM | OXYGEN SATURATION: 98 % | WEIGHT: 184 LBS | BODY MASS INDEX: 21.29 KG/M2

## 2022-12-22 DIAGNOSIS — S13.4XXD WHIPLASH INJURIES, SUBSEQUENT ENCOUNTER: ICD-10-CM

## 2022-12-22 DIAGNOSIS — M54.2 NECK PAIN: ICD-10-CM

## 2022-12-22 DIAGNOSIS — M54.50 LOW BACK PAIN AT MULTIPLE SITES: ICD-10-CM
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
## 2022-12-22 DIAGNOSIS — M25.511 ACUTE PAIN OF RIGHT SHOULDER: ICD-10-CM

## 2022-12-22 DIAGNOSIS — M54.9 ACUTE UPPER BACK PAIN: ICD-10-CM

## 2022-12-22 PROCEDURE — G8432 DEP SCR NOT DOC, RNG: HCPCS | Performed by: FAMILY MEDICINE

## 2022-12-22 PROCEDURE — 3078F DIAST BP <80 MM HG: CPT | Performed by: FAMILY MEDICINE

## 2022-12-22 PROCEDURE — 99213 OFFICE O/P EST LOW 20 MIN: CPT | Performed by: FAMILY MEDICINE

## 2022-12-22 PROCEDURE — G8536 NO DOC ELDER MAL SCRN: HCPCS | Performed by: FAMILY MEDICINE

## 2022-12-22 PROCEDURE — 3017F COLORECTAL CA SCREEN DOC REV: CPT | Performed by: FAMILY MEDICINE

## 2022-12-22 PROCEDURE — 3074F SYST BP LT 130 MM HG: CPT | Performed by: FAMILY MEDICINE

## 2022-12-22 PROCEDURE — G8420 CALC BMI NORM PARAMETERS: HCPCS | Performed by: FAMILY MEDICINE

## 2022-12-22 PROCEDURE — 1123F ACP DISCUSS/DSCN MKR DOCD: CPT | Performed by: FAMILY MEDICINE

## 2022-12-22 PROCEDURE — 1101F PT FALLS ASSESS-DOCD LE1/YR: CPT | Performed by: FAMILY MEDICINE

## 2022-12-22 PROCEDURE — G8427 DOCREV CUR MEDS BY ELIG CLIN: HCPCS | Performed by: FAMILY MEDICINE

## 2022-12-22 PROCEDURE — G0463 HOSPITAL OUTPT CLINIC VISIT: HCPCS | Performed by: FAMILY MEDICINE

## 2022-12-22 RX ORDER — DEXAMETHASONE 4 MG/1
4 TABLET ORAL
COMMUNITY
Start: 2022-12-21 | End: 2022-12-28

## 2022-12-22 RX ORDER — METOLAZONE 5 MG/1
TABLET ORAL
COMMUNITY
Start: 2022-09-19

## 2022-12-22 RX ORDER — TORSEMIDE 100 MG/1
TABLET ORAL
COMMUNITY
Start: 2022-09-19

## 2022-12-22 RX ORDER — POTASSIUM CHLORIDE 1500 MG/1
20 TABLET, FILM COATED, EXTENDED RELEASE ORAL 2 TIMES DAILY
COMMUNITY
Start: 2022-12-09

## 2022-12-22 RX ORDER — DICLOFENAC SODIUM 75 MG/1
75 TABLET, DELAYED RELEASE ORAL
Qty: 40 TABLET | Refills: 3 | Status: SHIPPED | OUTPATIENT
Start: 2022-12-22
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
## 2022-12-22 NOTE — LETTER
NOTIFICATION RETURN TO WORK / SCHOOL    12/22/2022 12:43 PM    Mr. Danae Calvo  [address] [id number]      To Whom It May Concern:    Danae Calvo is currently under the care of [facility]. He will return to work/school on: 12/26/22    If there are questions or concerns please have the patient contact our office.         Sincerely,      Rosa Gonzalez MD
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
## 2023-02-22 ENCOUNTER — OFFICE VISIT (OUTPATIENT)
Dept: FAMILY MEDICINE CLINIC | Age: 78
End: 2023-02-22
Payer: MEDICARE

## 2023-02-22 VITALS
TEMPERATURE: 98.3 F | RESPIRATION RATE: 18 BRPM | WEIGHT: 180 LBS | SYSTOLIC BLOOD PRESSURE: 146 MMHG | DIASTOLIC BLOOD PRESSURE: 90 MMHG | HEART RATE: 84 BPM | OXYGEN SATURATION: 96 % | BODY MASS INDEX: 23.86 KG/M2 | HEIGHT: 73 IN

## 2023-02-22 DIAGNOSIS — N18.30 STAGE 3 CHRONIC KIDNEY DISEASE, UNSPECIFIED WHETHER STAGE 3A OR 3B CKD (HCC): ICD-10-CM

## 2023-02-22 DIAGNOSIS — M25.511 CHRONIC PAIN OF BOTH SHOULDERS: ICD-10-CM

## 2023-02-22 DIAGNOSIS — G89.29 CHRONIC PAIN OF BOTH SHOULDERS: ICD-10-CM

## 2023-02-22 DIAGNOSIS — Z00.00 MEDICARE ANNUAL WELLNESS VISIT, SUBSEQUENT: Primary | ICD-10-CM

## 2023-02-22 DIAGNOSIS — D69.6 PLATELETS DECREASED (HCC): ICD-10-CM

## 2023-02-22 DIAGNOSIS — R60.0 LOCALIZED EDEMA: ICD-10-CM

## 2023-02-22 DIAGNOSIS — M25.512 CHRONIC PAIN OF BOTH SHOULDERS: ICD-10-CM

## 2023-02-22 DIAGNOSIS — E53.8 VITAMIN B12 DEFICIENCY NEUROPATHY (HCC): ICD-10-CM

## 2023-02-22 DIAGNOSIS — G63 VITAMIN B12 DEFICIENCY NEUROPATHY (HCC): ICD-10-CM

## 2023-02-22 DIAGNOSIS — Z71.89 ADVANCED DIRECTIVES, COUNSELING/DISCUSSION: ICD-10-CM

## 2023-02-22 PROCEDURE — G8427 DOCREV CUR MEDS BY ELIG CLIN: HCPCS | Performed by: FAMILY MEDICINE

## 2023-02-22 PROCEDURE — G8536 NO DOC ELDER MAL SCRN: HCPCS | Performed by: FAMILY MEDICINE

## 2023-02-22 PROCEDURE — 99497 ADVNCD CARE PLAN 30 MIN: CPT | Performed by: FAMILY MEDICINE

## 2023-02-22 PROCEDURE — 3078F DIAST BP <80 MM HG: CPT | Performed by: FAMILY MEDICINE

## 2023-02-22 PROCEDURE — G8420 CALC BMI NORM PARAMETERS: HCPCS | Performed by: FAMILY MEDICINE

## 2023-02-22 PROCEDURE — 1101F PT FALLS ASSESS-DOCD LE1/YR: CPT | Performed by: FAMILY MEDICINE

## 2023-02-22 PROCEDURE — G8510 SCR DEP NEG, NO PLAN REQD: HCPCS | Performed by: FAMILY MEDICINE

## 2023-02-22 PROCEDURE — 20610 DRAIN/INJ JOINT/BURSA W/O US: CPT | Performed by: FAMILY MEDICINE

## 2023-02-22 PROCEDURE — 3074F SYST BP LT 130 MM HG: CPT | Performed by: FAMILY MEDICINE

## 2023-02-22 PROCEDURE — 1123F ACP DISCUSS/DSCN MKR DOCD: CPT | Performed by: FAMILY MEDICINE

## 2023-02-22 PROCEDURE — G0439 PPPS, SUBSEQ VISIT: HCPCS | Performed by: FAMILY MEDICINE

## 2023-02-22 RX ORDER — FUROSEMIDE 40 MG/1
40 TABLET ORAL DAILY
Qty: 30 TABLET | Refills: 3 | Status: SHIPPED | OUTPATIENT
Start: 2023-02-22

## 2023-02-22 RX ORDER — TRIAMCINOLONE ACETONIDE 40 MG/ML
80 INJECTION, SUSPENSION INTRA-ARTICULAR; INTRAMUSCULAR ONCE
Qty: 1 ML | Refills: 0 | Status: CANCELLED
Start: 2023-02-22 | End: 2023-02-22

## 2023-02-22 RX ORDER — POTASSIUM CHLORIDE 20 MEQ/1
20 TABLET, EXTENDED RELEASE ORAL 2 TIMES DAILY
Qty: 30 TABLET | Refills: 3 | Status: SHIPPED | OUTPATIENT
Start: 2023-02-22

## 2023-02-22 RX ORDER — TRIAMCINOLONE ACETONIDE 40 MG/ML
40 INJECTION, SUSPENSION INTRA-ARTICULAR; INTRAMUSCULAR ONCE
Status: COMPLETED | OUTPATIENT
Start: 2023-02-22 | End: 2023-02-22

## 2023-02-22 RX ADMIN — TRIAMCINOLONE ACETONIDE 40 MG: 40 INJECTION, SUSPENSION INTRA-ARTICULAR; INTRAMUSCULAR at 11:15

## 2023-02-22 NOTE — PROGRESS NOTES
This is the Subsequent Medicare Annual Wellness Exam, performed 12 months or more after the Initial AWV or the last Subsequent AWV    I have reviewed the patient's medical history in detail and updated the computerized patient record. Assessment/Plan   Education and counseling provided:  Are appropriate based on today's review and evaluation  End-of-Life planning (with patient's consent)  Pneumococcal Vaccine  Influenza Vaccine  Bone mass measurement (DEXA)    1. Medicare annual wellness visit, subsequent  2. Advanced directives, counseling/discussion  -     ADVANCE CARE PLANNING FIRST 30 MINS  -     FULL CODE  3. Localized edema  -     furosemide (LASIX) 40 mg tablet; Take 1 Tablet by mouth daily. , Normal, Disp-30 Tablet, R-3  -     potassium chloride (K-DUR, KLOR-CON M20) 20 mEq tablet; Take 1 Tablet by mouth two (2) times a day., Normal, Disp-30 Tablet, R-3  4. Vitamin B12 deficiency neuropathy ([facility] [address]  Assessment & Plan:   at goal, continue current medications, continue current treatment plan  5. Platelets decreased ([address]  Assessment & Plan:   monitored by specialist. No acute findings meriting change in the plan  6. Stage 3 chronic kidney disease, unspecified whether stage 3a or 3b CKD ([facility] [address]  Assessment & Plan:   monitored by specialist. No acute findings meriting change in the plan  7.  Chronic pain of both shoulders  -     IA ARTHROCENTESIS ASPIR&/INJ MAJOR JT/BURSA W/O US  -     triamcinolone acetonide (KENALOG-40) 40 mg/mL injection 40 mg; 40 mg, Intra artICUlar, ONCE, 1 dose, On Wed 2/22/23 at 1400       Depression Risk Factor Screening     3 most recent PHQ Screens 2/22/2023   Little interest or pleasure in doing things Not at all   Feeling down, depressed, irritable, or hopeless Not at all   Total Score PHQ 2 0   Trouble falling or staying asleep, or sleeping too much -   Feeling tired or having little energy -   Poor appetite, weight loss, or overeating -   Feeling bad about yourself - or that you are a failure or have let yourself or your family down -   Trouble concentrating on things such as school, work, reading, or watching TV -   Moving or speaking so slowly that other people could have noticed; or the opposite being so fidgety that others notice -   Thoughts of being better off dead, or hurting yourself in some way -   PHQ 9 Score -   How difficult have these problems made it for you to do your work, take care of your home and get along with others -       Alcohol & Drug Abuse Risk Screen    Do you average more than 1 drink per night or more than 7 drinks a week: No    In the past three months have you have had more than 4 drinks containing alcohol on one occasion: No          Functional Ability and Level of Safety    Hearing: Hearing is good. Activities of Daily Living: The home contains: handrails, grab bars, and rugs  Patient does total self care      Ambulation: with no difficulty     Fall Risk:  Fall Risk Assessment, last 12 mths 2/22/2023   Able to walk? Yes   Fall in past 12 months? 0   Do you feel unsteady? 0   Are you worried about falling 0   Is TUG test greater than 12 seconds?  -   Is the gait abnormal? -      Abuse Screen:  Patient is not abused       Cognitive Screening    Has your family/caregiver stated any concerns about your memory: no     Cognitive Screening: Normal - MMSE (Mini Mental Status Exam)    Health Maintenance Due     Health Maintenance Due   Topic Date Due    Shingles Vaccine (1 of 2) Never done    COVID-19 Vaccine (4 - Booster for Moderna series) 03/15/2022    Lipid Screen  01/18/2023       Patient Care Team   Patient Care Team:  Trevin Vincent MD as PCP - General (Family Medicine)  Trevin Vincent MD as PCP - [facility] Empaneled Provider  Taylor James MD as Physician (Cardiovascular Disease Physician)    History     Patient Active Problem List   Diagnosis Code    HTN (hypertension) I10    B12 deficiency E53.8    Pre-diabetes R73.03    WBC decreased D72.819    Elevated PSA, less than 10 ng/ml R97.20    Hypercholesteremia E78.00    Platelets decreased (HCC) D69.6    Vitamin D deficiency E55.9    Hypokalemia E87.6    Localized edema R60.0    Patient is full code Z78.9    Vitamin B12 deficiency neuropathy ([facility] [address] E53.8, G63    Leg swelling M79.89    Pain in both lower extremities M79.604, M79.605    Decreased pedal pulses R09.89    Venous insufficiency of both lower extremities I87.2    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, 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    Chronic renal disease, stage III N18.30     Past Medical History:   Diagnosis Date    Elevated PSA, less than 10 ng/ml 12/10/2012    Hyperlipidemia     Hypertension     Hypokalemia 7/25/2014    Localized edema 2/7/2017    Long term current use of anticoagulant therapy     Patient is full code 9/18/2018    Syncope     Vitamin B12 deficiency neuropathy ([facility] [address] 6/18/2019    Vitamin D deficiency 7/8/2014    WBC decreased 7/6/2012      Past Surgical History:   Procedure Laterality Date    COLONOSCOPY,DIAGNOSTIC  2/5/2015         HX COLONOSCOPY      HX ORTHOPAEDIC      left knee surg in  1984     Current Outpatient Medications   Medication Sig Dispense Refill    furosemide (LASIX) 40 mg tablet Take 1 Tablet by mouth daily. 30 Tablet 3    potassium chloride (K-DUR, KLOR-CON M20) 20 mEq tablet Take 1 Tablet by mouth two (2) times a day. 30 Tablet 3    metOLazone (ZAROXOLYN) 5 mg tablet       diclofenac EC (VOLTAREN) 75 mg EC tablet Take 1 Tablet by mouth two (2) times daily as needed for Pain. 40 Tablet 3    acetaminophen (Tylenol 8 Hour) 650 mg TbER Take 1 Tablet by mouth every eight (8) hours. 40 Tablet 0    potassium chloride (K-DUR, KLOR-CON M20) 20 mEq tablet Take 1 Tablet by mouth two (2) times a day.  Indications: prevention of low potassium in the blood 180 Tablet 1    cholecalciferol (Vitamin D3) 25 mcg (1,000 unit) cap One tab daily 90 Capsule 3    cyanocobalamin (Vitamin B-12) 1,000 mcg tablet Take 1 Tab by mouth daily. 30 Tab 6    rosuvastatin (CRESTOR) 5 mg tablet Take 1 Tab by mouth nightly. 30 Tab 6    aspirin delayed-release 81 mg tablet Take  by mouth daily. potassium chloride SR (K-TAB) 20 mEq tablet Take 20 mEq by mouth two (2) times a day. lidocaine (LIDODERM) 5 % Apply patch to the affected area for 12 hours a day and remove for 12 hours a day. (Patient not taking: No sig reported) 20 Each 1     Current Facility-Administered Medications   Medication Dose Route Frequency Provider Last Rate Last Admin    triamcinolone acetonide (KENALOG-40) 40 mg/mL injection 40 mg  40 mg Intra artICUlar ONCE Rey Lujan MD         No Known Allergies    Family History   Problem Relation Age of Onset    Kidney Disease Father     Hypertension Mother      Social History     Tobacco Use    Smoking status: Never    Smokeless tobacco: Never   Substance Use Topics    Alcohol use: No         Arma Schaumann, MD   Indications:   After consent was obtained, using sterile technique the rt shoulder was prepped and The joint was entered and 0ml's of  fluid was withdrawn. Steroid 40 mg and 2 ml plain Lidocaine was then injected and the needle withdrawn. The procedure was well tolerated. The patient is asked to continue to rest the joint for a few more days before resuming regular activities. It may be more painful for the first 1-2 days. Watch for fever, or increased swelling or persistent pain in the joint. Call or return to clinic prn if such symptoms occur or there is failure to improve as anticipated. reported) 20 Each 1     Current Facility-Administered Medications   Medication Dose Route Frequency Provider Last Rate Last Admin    triamcinolone acetonide (KENALOG-40) 40 mg/mL injection 40 mg  40 mg Intra artICUlar ONCE Edna Benites MD         No Known Allergies    Family History   Problem Relation Age of Onset    Kidney Disease Father     Hypertension Mother      Social History     Tobacco Use    Smoking status: Never    Smokeless tobacco: Never   Substance Use Topics    Alcohol use: No         Jarret Henderson MD   Indications:   After consent was obtained, using sterile technique the rt shoulder was prepped and The joint was entered and 0ml's of  fluid was withdrawn. Steroid 40 mg and 2 ml plain Lidocaine was then injected and the needle withdrawn. The procedure was well tolerated. The patient is asked to continue to rest the joint for a few more days before resuming regular activities. It may be more painful for the first 1-2 days. Watch for fever, or increased swelling or persistent pain in the joint. Call or return to clinic prn if such symptoms occur or there is failure to improve as anticipated.

## 2023-02-22 NOTE — PROGRESS NOTES
Identified pt with two pt identifiers(name and ). Reviewed record in preparation for visit and have obtained necessary documentation. Chief Complaint   Patient presents with    Leg Swelling     Follow up     Follow-up     2 month follow up         Vitals:    23 1108   BP: (!) 146/90   Pulse: 84   Resp: 18   Temp: 98.3 °F (36.8 °C)   TempSrc: Oral   SpO2: 96%   Weight: 180 lb (81.6 kg)   Height: 6' 1\" (1.854 m)   PainSc:   6   PainLoc: Leg     [address] [facility]  OFFICE PROCEDURE PROGRESS NOTE        Chart reviewed for the following:   Thiago DIXON, have reviewed the History, Physical and updated the Allergic reactions for [address] [place] performed immediately prior to start of procedure:   Thiago DIXON, have performed the following reviews on [address] prior to the start of the procedure:            * Patient was identified by name and date of birth   * Agreement on procedure being performed was verified  * Risks and Benefits explained to the patient  * Procedure site verified and marked as necessary  * Patient was positioned for comfort  * Consent was signed and verified     Time: 11:40    Right Shoulder Steroid Injection     Date of procedure: 2023    Procedure performed by:  Oliver Barone MD    Provider assisted by: Self    Patient assisted by: Self    How tolerated by patient: tolerated the procedure well with no complications    Post Procedural Pain Scale: 0 - No Hurt    Comments: none         Health Maintenance Due   Topic    Shingles Vaccine (1 of 2)    COVID-19 Vaccine (4 - Booster for Moderna series)    Lipid Screen     Medicare Yearly Exam        Coordination of Care Questionnaire:  :   1) Have you been to an emergency room, urgent care, or hospitalized since your last visit? If yes, where when, and reason for visit? no       2. Have seen or consulted any other health care provider since your last visit?    If yes, where when, and reason for visit? NO      Patient is accompanied by self I have received verbal consent from Riya Mcgill to discuss any/all medical information while they are present in the room.

## 2023-02-22 NOTE — PATIENT INSTRUCTIONS
Medicare Wellness Visit, Male    The best way to live healthy is to have a lifestyle where you eat a well-balanced diet, exercise regularly, limit alcohol use, and quit all forms of tobacco/nicotine, if applicable. Regular preventive services are another way to keep healthy. Preventive services (vaccines, screening tests, monitoring & exams) can help personalize your care plan, which helps you manage your own care. Screening tests can find health problems at the earliest stages, when they are easiest to treat. [facility] follows the current, evidence-based guidelines published by the [facility] ([facility]) when recommending preventive services for our patients. Because we follow these guidelines, sometimes recommendations change over time as research supports it. (For example, a prostate screening blood test is no longer routinely recommended for men with no symptoms). Of course, you and your doctor may decide to screen more often for some diseases, based on your risk and co-morbidities (chronic disease you are already diagnosed with). Preventive services for you include:  - Medicare offers their members a free annual wellness visit, which is time for you and your primary care provider to discuss and plan for your preventive service needs.  Take advantage of this benefit every year!    -Over the age of 72 should receive the recommended pneumonia vaccines.   -All adults should have a flu vaccine yearly.  -All adults should receive a tetanus vaccine every 10 years.  -Over the age of 48 should receive the shingles vaccines.    -All adults should be screened once for Hepatitis C.  -All adults age 38-68 who are overweight should have a diabetes screening test once every three years.   -Other screening tests & preventive services for persons with diabetes include: an eye exam to screen for diabetic retinopathy, a kidney function test, a foot exam, and stricter control over your cholesterol.   -Cardiovascular screening for adults with routine risk involves an electrocardiogram (ECG) at intervals determined by the provider.     -Colorectal cancer screening should be done for adults age 43-69 with no increased risk factors for colorectal cancer. There are a number of acceptable methods of screening for this type of cancer. Each test has its own benefits and drawbacks. Discuss with your provider what is most appropriate for you during your annual wellness visit. The different tests include: colonoscopy (considered the best screening method), a fecal occult blood test, a fecal DNA test, and sigmoidoscopy.    -Lung cancer screening is recommended annually with a low dose CT scan for adults between age 54 and 68, who have smoked at least 30 pack years (equivalent of 1 pack per day for 30 days), and who is a current smoker or quit less than 15 years ago. -An Abdominal Aortic Aneurysm (AAA) Screening is recommended for men age 73-68 who has ever smoked in their lifetime. Here is a list of your current Health Maintenance items (your personalized list of preventive services) with a due date:  Health Maintenance Due   Topic Date Due    Shingles Vaccine (1 of 2) Never done    COVID-19 Vaccine (4 - Booster for Moderna series) 03/15/2022    Cholesterol Test   01/18/2023            Learning About Joint Injections  What are joint injections? Joint injections are shots into a joint, such as the knee or shoulder. They are used to put in medicines, such as pain relievers and steroid medicines. Steroids can be injected directly into a swollen and painful joint to reduce inflammation. A steroid shot can sometimes help with short-term pain relief when other treatments haven't worked. If steroid shots help, pain may improve for weeks or months. How are they done? First, the area over the joint will be cleaned.  Your doctor may then use a tiny needle to numb the skin in the area where you will get the joint injection. If a tiny needle is used to numb the area, your doctor will use another needle to inject the medicine. Your doctor may use a pain reliever, a steroid, or both. You may feel some pressure or discomfort. The procedure takes 10 to 30 minutes. But the injection itself usually takes only a few minutes. Your doctor may put ice on the area before you go home. You will probably go home soon after your shot. What can you expect after a joint injection? You may have numbness around the joint for a few hours. If your shot included both a pain reliever and a steroid, then the pain will probably go away right away. But it might come back after a few hours. This might happen if the pain reliever wears off and the steroid hasn't started to work yet. Steroids don't always work. But when they do, the pain relief can last for several days to a few months or longer. Your doctor may tell you to use ice on the area. You can also use ice if the pain comes back. Put ice or a cold pack on your joint for 10 to 20 minutes at a time. Put a thin cloth between the ice and your skin. Follow your doctor's instructions carefully. Follow-up care is a key part of your treatment and safety. Be sure to make and go to all appointments, and call your doctor if you are having problems. It's also a good idea to know your test results and keep a list of the medicines you take. Where can you learn more? Go to http://www.gray.com/. Enter S709 in the search box to learn more about \"Learning About Joint Injections. \"  Current as of: September 20, 2018  Content Version: 11.9  © 1950-0432 [employer]. Care instructions adapted under license by [employer] (which disclaims liability or warranty for this information).  If you have questions about a medical condition or this instruction, always ask your healthcare professional. [address] any warranty or liability for your use of this information.

## 2023-02-22 NOTE — ACP (ADVANCE CARE PLANNING)
Advance Care Planning     Advance Care Planning (ACP) Physician/NP/PA Conversation      Date of Conversation: 2/22/2023  Conducted with: Patient with Decision Making Capacity      Conversation Conducted with:   Patient with Decision Making Capacity, stating that the Other Legally Authorized Decision Maker would be friend Traci Garcia as SDM         Patient is on presence of no family member,, stated that the pt wants to be not DNR at this time,  The pt likes to be a full code individual,  The patient states that there is a lot of will to live,      Conversation Outcomes / Follow-Up Plan:   Completed new Advance Directive      Length of ACP Conversation in minutes:  12 minutes        Nehemiah Farrell MD

## 2023-03-22 ENCOUNTER — OFFICE VISIT (OUTPATIENT)
Dept: FAMILY MEDICINE CLINIC | Age: 78
End: 2023-03-22
Payer: MEDICARE

## 2023-03-22 VITALS
HEART RATE: 77 BPM | RESPIRATION RATE: 16 BRPM | HEIGHT: 73 IN | SYSTOLIC BLOOD PRESSURE: 102 MMHG | BODY MASS INDEX: 22.93 KG/M2 | OXYGEN SATURATION: 97 % | TEMPERATURE: 98.2 F | DIASTOLIC BLOOD PRESSURE: 58 MMHG | WEIGHT: 173 LBS

## 2023-03-22 DIAGNOSIS — E87.6 HYPOKALEMIA: ICD-10-CM

## 2023-03-22 DIAGNOSIS — I10 HYPERTENSION, UNSPECIFIED TYPE: ICD-10-CM

## 2023-03-22 DIAGNOSIS — J20.8 ACUTE BRONCHITIS DUE TO OTHER SPECIFIED ORGANISMS: Primary | ICD-10-CM

## 2023-03-22 DIAGNOSIS — R60.0 LOCALIZED EDEMA: ICD-10-CM

## 2023-03-22 DIAGNOSIS — G63 VITAMIN B12 DEFICIENCY NEUROPATHY (HCC): ICD-10-CM

## 2023-03-22 DIAGNOSIS — E53.8 VITAMIN B12 DEFICIENCY NEUROPATHY (HCC): ICD-10-CM

## 2023-03-22 DIAGNOSIS — N18.30 STAGE 3 CHRONIC KIDNEY DISEASE, UNSPECIFIED WHETHER STAGE 3A OR 3B CKD (HCC): ICD-10-CM

## 2023-03-22 PROCEDURE — G0463 HOSPITAL OUTPT CLINIC VISIT: HCPCS | Performed by: FAMILY MEDICINE

## 2023-03-22 PROCEDURE — 99213 OFFICE O/P EST LOW 20 MIN: CPT | Performed by: FAMILY MEDICINE

## 2023-03-22 PROCEDURE — G8510 SCR DEP NEG, NO PLAN REQD: HCPCS | Performed by: FAMILY MEDICINE

## 2023-03-22 PROCEDURE — 1123F ACP DISCUSS/DSCN MKR DOCD: CPT | Performed by: FAMILY MEDICINE

## 2023-03-22 PROCEDURE — G8420 CALC BMI NORM PARAMETERS: HCPCS | Performed by: FAMILY MEDICINE

## 2023-03-22 PROCEDURE — G8536 NO DOC ELDER MAL SCRN: HCPCS | Performed by: FAMILY MEDICINE

## 2023-03-22 PROCEDURE — G8427 DOCREV CUR MEDS BY ELIG CLIN: HCPCS | Performed by: FAMILY MEDICINE

## 2023-03-22 PROCEDURE — 1101F PT FALLS ASSESS-DOCD LE1/YR: CPT | Performed by: FAMILY MEDICINE

## 2023-03-22 PROCEDURE — 3078F DIAST BP <80 MM HG: CPT | Performed by: FAMILY MEDICINE

## 2023-03-22 PROCEDURE — 3074F SYST BP LT 130 MM HG: CPT | Performed by: FAMILY MEDICINE

## 2023-03-22 RX ORDER — DOXYCYCLINE 100 MG/1
100 CAPSULE ORAL 2 TIMES DAILY
Qty: 20 CAPSULE | Refills: 0 | Status: SHIPPED | OUTPATIENT
Start: 2023-03-22 | End: 2023-04-01

## 2023-03-22 RX ORDER — BENZONATATE 200 MG/1
200 CAPSULE ORAL
Qty: 14 CAPSULE | Refills: 0 | Status: SHIPPED | OUTPATIENT
Start: 2023-03-22 | End: 2023-03-29

## 2023-03-22 RX ORDER — CYANOCOBALAMIN 1000 UG/ML
1000 INJECTION, SOLUTION INTRAMUSCULAR; SUBCUTANEOUS ONCE
Qty: 1 ML | Refills: 0 | Status: CANCELLED
Start: 2023-03-22 | End: 2023-03-22

## 2023-03-22 NOTE — LETTER
NOTIFICATION RETURN TO WORK / SCHOOL    3/22/2023 11:35 AM    Mr. Lizette Miranda [id number]      To Whom It May Concern:    Geraldine Mar is currently under the care of [facility]. He will return to work/school on: 03/27/2023    If there are questions or concerns please have the patient contact our office.         Sincerely,      Armen Nolasco MD

## 2023-03-22 NOTE — PROGRESS NOTES
Identified pt with two pt identifiers(name and ). Reviewed record in preparation for visit and have obtained necessary documentation. Chief Complaint   Patient presents with    Follow-up     1 month follow up         Vitals:    23 1043   BP: (!) 102/58   Pulse: 77   Resp: 16   Temp: 98.2 °F (36.8 °C)   TempSrc: Oral   SpO2: 97%   Weight: 173 lb (78.5 kg)   Height: 6' 1\" (1.854 m)   PainSc:   6   PainLoc: Chest       Health Maintenance Due   Topic    Lipid Screen        Coordination of Care Questionnaire:  :   1) Have you been to an emergency room, urgent care, or hospitalized since your last visit? If yes, where when, and reason for visit? no       2. Have seen or consulted any other health care provider since your last visit? If yes, where when, and reason for visit? NO      Patient is accompanied by self I have received verbal consent from Dayton Pantoja to discuss any/all medical information while they are present in the room.

## 2023-03-22 NOTE — Clinical Note
Please have the patient to be called if he has been taking his potassium tablet twice daily he needs to take it 3 times daily if he has been taking his potassium tablet once daily he needs to take it twice daily his potassium level also need to be repeated in 4 to 6 weeks,

## 2023-03-22 NOTE — LETTER
NOTIFICATION RETURN TO WORK / SCHOOL    3/22/2023 11:14 AM    Mr. Geraldine Mar  [address]  [facility] [id number]      To Whom It May Concern:    Geraldine Mar is currently under the care of [address] [facility]. He will return to work/school on: 2/27/2023 due to the current illness. If there are questions or concerns please have the patient contact our office.         Sincerely,      Armen Nolasco MD

## 2023-03-23 LAB
ANION GAP SERPL CALC-SCNC: 6 MMOL/L (ref 5–15)
BUN SERPL-MCNC: 22 MG/DL (ref 6–20)
BUN/CREAT SERPL: 14 (ref 12–20)
CALCIUM SERPL-MCNC: 9.6 MG/DL (ref 8.5–10.1)
CHLORIDE SERPL-SCNC: 96 MMOL/L (ref 97–108)
CO2 SERPL-SCNC: 35 MMOL/L (ref 21–32)
CREAT SERPL-MCNC: 1.58 MG/DL (ref 0.7–1.3)
GLUCOSE SERPL-MCNC: 102 MG/DL (ref 65–100)
POTASSIUM SERPL-SCNC: 3.2 MMOL/L (ref 3.5–5.1)
SODIUM SERPL-SCNC: 137 MMOL/L (ref 136–145)

## 2023-03-24 RX ORDER — POTASSIUM CHLORIDE 20 MEQ/1
20 TABLET, EXTENDED RELEASE ORAL 3 TIMES DAILY
Qty: 180 TABLET | Refills: 1
Start: 2023-03-24

## 2023-03-24 NOTE — PROGRESS NOTES
Radha Levy (: 1945) is a 70 y.o. male, established patient, here for evaluation of the following chief complaint(s):  Follow-up (1 month follow up )     Today's Pt main concerns, neg fever with wet cough no dyspnea, ++ha, ++sore throat, feeling tired chills and sweats, ot dizzy, nl smell nl taste, no N/V/D, no body ache. over all stating that things are getting better have a good family support at this time, who brings food for the pt       Review of Systems       Constitutional: no chills and fever, ++fatigue  HENT: no ear pain or nosebleeds. No blurred vision    Respiratory: no shortness of breath, wheezing ++cough, ++ sore throat, ++congestion +++hoarseness,     Cardiovascular: Has no chest pain, ,and racing heart . Gastrointestinal: No constipation, diarrhea, nausea and vomiting. Genitourinary: No frequency. Musculoskeletal: Negative for joint pain. Skin: no itching, no rash. Neurological: Negative for dizziness, no tremors, +HA  Psychiatric/Behavioral: Negative for depression   is not nervous/anxious. General:  alert cooperative appears stated for the age  [de-identified]; normocephalic and atraumatic PERRLA extraocular motor intact normal tympanic membrane normal external ear bilaterally, mucosal normal no drainage  Neck: supple no adenopathy no JVD no bruit  Lungs: Clear to auscultation bilaterally no rales rhonchi or wheezes  Heart: Normal regular S1-S2 ,  no murmur  Breast exam deferred  Abdomen: Soft nontender normal bowel sounds   Extremities: Normal range of motion no point tenderness normal pulses no edema  Pelvic/: No lesion, no lymphadenopathy  Skin: Normal no lesion no rash        ASSESSMENT/PLAN:  Below is the assessment and plan developed based on review of pertinent history, physical exam, labs, studies, and medications. 1. Acute bronchitis due to other specified organisms  -     doxycycline (VIBRAMYCIN) 100 mg capsule; Take 1 Capsule by mouth two (2) times a day for 10 days. , Normal, Disp-20 Capsule, R-0  -     benzonatate (TESSALON) 200 mg capsule; Take 1 Capsule by mouth two (2) times daily as needed for Cough for up to 7 days. , Normal, Disp-14 Capsule, R-0  -     METABOLIC PANEL, BASIC; Future  2. Localized edema  -     doxycycline (VIBRAMYCIN) 100 mg capsule; Take 1 Capsule by mouth two (2) times a day for 10 days. , Normal, Disp-20 Capsule, R-0  -     benzonatate (TESSALON) 200 mg capsule; Take 1 Capsule by mouth two (2) times daily as needed for Cough for up to 7 days. , Normal, Disp-14 Capsule, R-0  -     METABOLIC PANEL, BASIC; Future      Return in about 2 months (around 5/22/2023), or if symptoms worsen or fail to improve. An electronic signature was used to authenticate this note.   -- Chaop Jose MD

## 2023-04-24 ENCOUNTER — OFFICE VISIT (OUTPATIENT)
Dept: FAMILY MEDICINE CLINIC | Age: 78
End: 2023-04-24
Payer: MEDICARE

## 2023-04-24 VITALS
BODY MASS INDEX: 23.19 KG/M2 | WEIGHT: 175 LBS | RESPIRATION RATE: 20 BRPM | HEIGHT: 73 IN | OXYGEN SATURATION: 97 % | TEMPERATURE: 98.4 F | HEART RATE: 75 BPM | SYSTOLIC BLOOD PRESSURE: 127 MMHG | DIASTOLIC BLOOD PRESSURE: 80 MMHG

## 2023-04-24 DIAGNOSIS — E87.6 HYPOKALEMIA: ICD-10-CM

## 2023-04-24 DIAGNOSIS — S59.901A INJURY OF RIGHT ELBOW, INITIAL ENCOUNTER: Primary | ICD-10-CM

## 2023-04-24 DIAGNOSIS — M25.421 ELBOW SWELLING, RIGHT: ICD-10-CM

## 2023-04-24 PROCEDURE — 1101F PT FALLS ASSESS-DOCD LE1/YR: CPT | Performed by: FAMILY MEDICINE

## 2023-04-24 PROCEDURE — G8536 NO DOC ELDER MAL SCRN: HCPCS | Performed by: FAMILY MEDICINE

## 2023-04-24 PROCEDURE — 99213 OFFICE O/P EST LOW 20 MIN: CPT | Performed by: FAMILY MEDICINE

## 2023-04-24 PROCEDURE — G8427 DOCREV CUR MEDS BY ELIG CLIN: HCPCS | Performed by: FAMILY MEDICINE

## 2023-04-24 PROCEDURE — 1123F ACP DISCUSS/DSCN MKR DOCD: CPT | Performed by: FAMILY MEDICINE

## 2023-04-24 PROCEDURE — 3078F DIAST BP <80 MM HG: CPT | Performed by: FAMILY MEDICINE

## 2023-04-24 PROCEDURE — G8510 SCR DEP NEG, NO PLAN REQD: HCPCS | Performed by: FAMILY MEDICINE

## 2023-04-24 PROCEDURE — G8420 CALC BMI NORM PARAMETERS: HCPCS | Performed by: FAMILY MEDICINE

## 2023-04-24 PROCEDURE — G0463 HOSPITAL OUTPT CLINIC VISIT: HCPCS | Performed by: FAMILY MEDICINE

## 2023-04-24 PROCEDURE — 3074F SYST BP LT 130 MM HG: CPT | Performed by: FAMILY MEDICINE

## 2023-04-24 RX ORDER — CEPHALEXIN 500 MG/1
500 CAPSULE ORAL 4 TIMES DAILY
Qty: 40 CAPSULE | Refills: 0 | Status: SHIPPED | OUTPATIENT
Start: 2023-04-24 | End: 2023-05-04

## 2023-04-24 NOTE — PROGRESS NOTES
Chief Complaint   Patient presents with    Swelling     Left leg and right arm       1. \"Have you been to the ER, urgent care clinic since your last visit? Hospitalized since your last visit? \" No    2. \"Have you seen or consulted any other health care providers outside of the [address] [place] [address] since your last visit? \" No     3. For patients aged 39-70: Has the patient had a colonoscopy / FIT/ Cologuard? NA - based on age      If the patient is female:    4. For patients aged 41-77: Has the patient had a mammogram within the past 2 years? NA - based on age or sex      11. For patients aged 21-65: Has the patient had a pap smear? NA - based on age or sex    Health Maintenance Due   Topic Date Due    Lipid Screen  01/18/2023   Verified patient with two type of identifiers.

## 2023-04-24 NOTE — PROGRESS NOTES
Jm Don (: 1945) is a 70 y.o. male, established patient, here for evaluation of the following chief complaint(s):  Swelling (Left leg and right arm)     Patient present with a complaint of rash on the rt elbow due to the injury,  Hit the door jam, rt elobw swelled with healed laceration , hand is also swelled up no sxs,  last tetan2022, pain is controlled with OTC            Constitutional: no chills and fever,  , nad     HENT: no ear pain or nosebleeds. No blurred vision  Respiratory: no shortness of breath, wheezing cough   Cardiovascular: Has no chest pain, ,and racing heart . Gastrointestinal: No constipation, diarrhea, nausea and vomiting. Genitourinary: No frequency. Musculoskeletal: Negative for joint pain. Skin: +++itching, +++ rash. Neurological: Negative for dizziness, no tremors  Psychiatric/Behavioral: Negative for depression   is not nervous/anxious. and not depressed the patient is not nervous/anxious. General:  alert cooperative appears stated for the age  [de-identified]; normocephalic and atraumatic PERRLA extraocular motor intact normal tympanic membrane normal external ear bilaterally, mucosal normal no drainage  Neck: supple no adenopathy no JVD no bruit  Lungs: Clear to auscultation bilaterally no rales rhonchi or wheezes  Heart: Normal regular S1-S2 ,  no murmur  Breast exam deferred  Abdomen: Soft nontender normal bowel sounds   Extremities: Normal range of motion no point tenderness normal pulses no edema  Pelvic/: No lesion, no lymphadenopathy  Skin: abnormal   ++ rash, no discharge,       ASSESSMENT/PLAN:  Below is the assessment and plan developed based on review of pertinent history, physical exam, labs, studies, and medications. 1. Injury of right elbow, initial encounter  -     XR ELBOW RT MIN 3 V; Future  -     cephALEXin (KEFLEX) 500 mg capsule; Take 1 Capsule by mouth four (4) times daily for 10 days. , Normal, Disp-40 Capsule, R-0  -     METABOLIC PANEL, BASIC; Future  2. Elbow swelling, right  -     XR ELBOW RT MIN 3 V; Future  -     cephALEXin (KEFLEX) 500 mg capsule; Take 1 Capsule by mouth four (4) times daily for 10 days. , Normal, Disp-40 Capsule, R-0  -     METABOLIC PANEL, BASIC; Future  3. Hypokalemia  -     METABOLIC PANEL, BASIC; Future      Return in 2 weeks (on 5/8/2023), or if symptoms worsen or fail to improve. X-ray results positive for soft tissue swelling no foreign body and no gas was noted    An electronic signature was used to authenticate this note.   -- Gregory Zee MD

## 2023-04-25 LAB
ANION GAP SERPL CALC-SCNC: 6 MMOL/L (ref 5–15)
BUN SERPL-MCNC: 11 MG/DL (ref 6–20)
BUN/CREAT SERPL: 9 (ref 12–20)
CALCIUM SERPL-MCNC: 9.5 MG/DL (ref 8.5–10.1)
CHLORIDE SERPL-SCNC: 101 MMOL/L (ref 97–108)
CO2 SERPL-SCNC: 31 MMOL/L (ref 21–32)
CREAT SERPL-MCNC: 1.28 MG/DL (ref 0.7–1.3)
GLUCOSE SERPL-MCNC: 100 MG/DL (ref 65–100)
POTASSIUM SERPL-SCNC: 3.3 MMOL/L (ref 3.5–5.1)
SODIUM SERPL-SCNC: 138 MMOL/L (ref 136–145)

## 2023-05-20 RX ORDER — DICLOFENAC SODIUM 75 MG/1
75 TABLET, DELAYED RELEASE ORAL 2 TIMES DAILY PRN
COMMUNITY
Start: 2022-12-22

## 2023-05-20 RX ORDER — LIDOCAINE 50 MG/G
PATCH TOPICAL
COMMUNITY
Start: 2022-11-25

## 2023-05-20 RX ORDER — POTASSIUM CHLORIDE 20 MEQ/1
20 TABLET, EXTENDED RELEASE ORAL 3 TIMES DAILY
COMMUNITY
Start: 2023-02-22

## 2023-05-20 RX ORDER — FUROSEMIDE 40 MG/1
40 TABLET ORAL DAILY
COMMUNITY
Start: 2023-02-22

## 2023-05-20 RX ORDER — SENNOSIDES 8.6 MG
650 CAPSULE ORAL EVERY 8 HOURS
COMMUNITY
Start: 2022-11-25

## 2023-05-20 RX ORDER — METOLAZONE 5 MG/1
TABLET ORAL
COMMUNITY
Start: 2022-09-19

## 2023-05-20 RX ORDER — POTASSIUM CHLORIDE 20 MEQ/1
20 TABLET, EXTENDED RELEASE ORAL 2 TIMES DAILY
COMMUNITY
Start: 2022-12-09

## 2023-05-20 RX ORDER — ASPIRIN 81 MG/1
TABLET ORAL DAILY
COMMUNITY

## 2023-05-20 RX ORDER — ROSUVASTATIN CALCIUM 5 MG/1
5 TABLET, COATED ORAL
COMMUNITY
Start: 2021-02-05

## 2023-05-23 ENCOUNTER — OFFICE VISIT (OUTPATIENT)
Age: 78
End: 2023-05-23
Payer: MEDICARE

## 2023-05-23 VITALS
BODY MASS INDEX: 24.41 KG/M2 | RESPIRATION RATE: 14 BRPM | SYSTOLIC BLOOD PRESSURE: 132 MMHG | DIASTOLIC BLOOD PRESSURE: 77 MMHG | TEMPERATURE: 97.9 F | HEART RATE: 81 BPM | WEIGHT: 185 LBS | OXYGEN SATURATION: 98 %

## 2023-05-23 DIAGNOSIS — M25.511 CHRONIC RIGHT SHOULDER PAIN: Primary | ICD-10-CM

## 2023-05-23 DIAGNOSIS — G89.29 CHRONIC RIGHT SHOULDER PAIN: Primary | ICD-10-CM

## 2023-05-23 PROCEDURE — 3078F DIAST BP <80 MM HG: CPT | Performed by: FAMILY MEDICINE

## 2023-05-23 PROCEDURE — 3074F SYST BP LT 130 MM HG: CPT | Performed by: FAMILY MEDICINE

## 2023-05-23 PROCEDURE — 99213 OFFICE O/P EST LOW 20 MIN: CPT | Performed by: FAMILY MEDICINE

## 2023-05-23 PROCEDURE — 1036F TOBACCO NON-USER: CPT | Performed by: FAMILY MEDICINE

## 2023-05-23 PROCEDURE — 1123F ACP DISCUSS/DSCN MKR DOCD: CPT | Performed by: FAMILY MEDICINE

## 2023-05-23 PROCEDURE — G8420 CALC BMI NORM PARAMETERS: HCPCS | Performed by: FAMILY MEDICINE

## 2023-05-23 PROCEDURE — G8427 DOCREV CUR MEDS BY ELIG CLIN: HCPCS | Performed by: FAMILY MEDICINE

## 2023-05-23 RX ORDER — TIZANIDINE 2 MG/1
2 TABLET ORAL NIGHTLY PRN
Qty: 30 TABLET | Refills: 0 | Status: SHIPPED | OUTPATIENT
Start: 2023-05-23

## 2023-05-23 RX ORDER — METHYLPREDNISOLONE 4 MG/1
TABLET ORAL
Qty: 21 TABLET | Refills: 0 | Status: SHIPPED | OUTPATIENT
Start: 2023-05-23

## 2023-05-23 ASSESSMENT — PATIENT HEALTH QUESTIONNAIRE - PHQ9
SUM OF ALL RESPONSES TO PHQ QUESTIONS 1-9: 0
2. FEELING DOWN, DEPRESSED OR HOPELESS: 0
SUM OF ALL RESPONSES TO PHQ QUESTIONS 1-9: 0
SUM OF ALL RESPONSES TO PHQ9 QUESTIONS 1 & 2: 0
1. LITTLE INTEREST OR PLEASURE IN DOING THINGS: 0
SUM OF ALL RESPONSES TO PHQ QUESTIONS 1-9: 0
SUM OF ALL RESPONSES TO PHQ QUESTIONS 1-9: 0

## 2023-05-23 NOTE — PROGRESS NOTES
Kasia Merino (:  1945) is a 70 y.o. male,Established patient, here for evaluation of the following chief complaint(s):  Shoulder Pain (Right shoulder pain ) and Foot Pain (Left foot pain )    Shoulder Pain   The history is provided by the patient. This is a chronic problem. Episode onset: few yrs ago, not obese, patient has a lot of difficulty with overhead activity, raising arm is very painful and the pain  awakens the patient at night,  The problem occurs constantly. The problem has not changed since onset. The pain is present in the lt shoulder. The quality of the pain is described as dull. The pain is at a severity of 8/10. Associated symptoms include limited range of motion. Pertinent negatives include no numbness, ++stiffness, no tingling, no itching, no back pain and + neck pain. The symptoms are aggravated by movement and palpation. There has been no history of extremity trauma. ''        Constitutional: no chills and fever,nad     HENT: no ear pain and nosebleeds. No blurred vision,   Respiratory: no shortness of breath, wheezing cough nor sore throat. Cardiovascular: Has no chest pain, leg swelling ,and racing heart . Gastrointestinal: No constipation, diarrhea, nausea and vomiting. Genitourinary: No frequency. Musculoskeletal: +++for multiple joint pain. Skin: no itching, pimples   Neurological: Negative for dizziness, no tremors  Psychiatric/Behavioral: Negative for depression,  not nervous/anxious. General: Patient alert cooperative   HEENT; normocephalic and atraumatic     Neck: supple no adenopathy no JVD no bruit  Lungs: Clear to auscultation bilaterally no rales rhonchi or wheezes  Heart: Normal regular S1-S2 s no murmur  Breast exam deferred  Abdomen: Soft nontender normal bowel sounds    Extremities: abnormal range of motion ++ point tenderness normal pulses no edema,   Pelvic/: No lesion, no lymphadenopathy  Skin: abormal no lesion no rash         ASSESSMENT/PLAN:  1.

## 2023-05-23 NOTE — PROGRESS NOTES
Chief Complaint   Patient presents with    Shoulder Pain     Right shoulder pain     Foot Pain     Left foot pain      Vitals:    23 1522   BP: 132/77   Pulse: 81   Resp: 14   Temp: 97.9 °F (36.6 °C)   TempSrc: Oral   SpO2: 98%   Weight: 185 lb (83.9 kg)        1. Have you been to the ER, urgent care clinic since your last visit? Hospitalized since your last visit? No    2. Have you seen or consulted any other health care providers outside of the [address] [place] [address] since your last visit? Include any pap smears or colon screening.  No    The patient, Sera Mil, identity was verified by  Name and

## 2024-06-18 ENCOUNTER — OFFICE VISIT (OUTPATIENT)
Age: 79
End: 2024-06-18

## 2024-06-18 VITALS
OXYGEN SATURATION: 93 % | BODY MASS INDEX: 25.18 KG/M2 | TEMPERATURE: 97.9 F | HEART RATE: 89 BPM | DIASTOLIC BLOOD PRESSURE: 87 MMHG | WEIGHT: 190 LBS | SYSTOLIC BLOOD PRESSURE: 135 MMHG | HEIGHT: 73 IN | RESPIRATION RATE: 18 BRPM

## 2024-06-18 DIAGNOSIS — N18.30 STAGE 3 CHRONIC KIDNEY DISEASE, UNSPECIFIED WHETHER STAGE 3A OR 3B CKD (HCC): ICD-10-CM

## 2024-06-18 DIAGNOSIS — H91.92 DECREASED HEARING OF LEFT EAR: ICD-10-CM

## 2024-06-18 DIAGNOSIS — H61.22 IMPACTED CERUMEN OF LEFT EAR: ICD-10-CM

## 2024-06-18 DIAGNOSIS — D69.6 THROMBOCYTOPENIA, UNSPECIFIED (HCC): ICD-10-CM

## 2024-06-18 DIAGNOSIS — H60.392 OTHER INFECTIVE ACUTE OTITIS EXTERNA OF LEFT EAR: ICD-10-CM

## 2024-06-18 DIAGNOSIS — H53.9 VISION CHANGES: ICD-10-CM

## 2024-06-18 DIAGNOSIS — Z00.00 MEDICARE ANNUAL WELLNESS VISIT, SUBSEQUENT: Primary | ICD-10-CM

## 2024-06-18 DIAGNOSIS — H54.7 DECREASED VISION: ICD-10-CM

## 2024-06-18 DIAGNOSIS — G63 POLYNEUROPATHY IN DISEASES CLASSIFIED ELSEWHERE (HCC): ICD-10-CM

## 2024-06-18 DIAGNOSIS — Z91.81 AT HIGH RISK FOR FALLS: ICD-10-CM

## 2024-06-18 DIAGNOSIS — R60.0 LOCALIZED EDEMA: ICD-10-CM

## 2024-06-18 RX ORDER — METOLAZONE 5 MG/1
5 TABLET ORAL DAILY
Qty: 30 TABLET | Refills: 2 | Status: SHIPPED | OUTPATIENT
Start: 2024-06-18

## 2024-06-18 RX ORDER — POTASSIUM CHLORIDE 20 MEQ/1
20 TABLET, EXTENDED RELEASE ORAL DAILY
Qty: 30 TABLET | Refills: 3 | Status: SHIPPED | OUTPATIENT
Start: 2024-06-18

## 2024-06-18 SDOH — ECONOMIC STABILITY: INCOME INSECURITY: HOW HARD IS IT FOR YOU TO PAY FOR THE VERY BASICS LIKE FOOD, HOUSING, MEDICAL CARE, AND HEATING?: PATIENT DECLINED

## 2024-06-18 SDOH — ECONOMIC STABILITY: FOOD INSECURITY: WITHIN THE PAST 12 MONTHS, THE FOOD YOU BOUGHT JUST DIDN'T LAST AND YOU DIDN'T HAVE MONEY TO GET MORE.: PATIENT DECLINED

## 2024-06-18 SDOH — ECONOMIC STABILITY: FOOD INSECURITY: WITHIN THE PAST 12 MONTHS, YOU WORRIED THAT YOUR FOOD WOULD RUN OUT BEFORE YOU GOT MONEY TO BUY MORE.: PATIENT DECLINED

## 2024-06-18 SDOH — ECONOMIC STABILITY: HOUSING INSECURITY
IN THE LAST 12 MONTHS, WAS THERE A TIME WHEN YOU DID NOT HAVE A STEADY PLACE TO SLEEP OR SLEPT IN A SHELTER (INCLUDING NOW)?: PATIENT DECLINED

## 2024-06-18 ASSESSMENT — PATIENT HEALTH QUESTIONNAIRE - PHQ9
SUM OF ALL RESPONSES TO PHQ QUESTIONS 1-9: 0
SUM OF ALL RESPONSES TO PHQ9 QUESTIONS 1 & 2: 0
SUM OF ALL RESPONSES TO PHQ QUESTIONS 1-9: 0
1. LITTLE INTEREST OR PLEASURE IN DOING THINGS: NOT AT ALL
SUM OF ALL RESPONSES TO PHQ QUESTIONS 1-9: 0
SUM OF ALL RESPONSES TO PHQ QUESTIONS 1-9: 0
2. FEELING DOWN, DEPRESSED OR HOPELESS: NOT AT ALL

## 2024-06-18 ASSESSMENT — LIFESTYLE VARIABLES
HOW MANY STANDARD DRINKS CONTAINING ALCOHOL DO YOU HAVE ON A TYPICAL DAY: PATIENT DOES NOT DRINK
HOW OFTEN DO YOU HAVE A DRINK CONTAINING ALCOHOL: NEVER

## 2024-06-18 NOTE — PROGRESS NOTES
Chief Complaint   Patient presents with    Medicare AWV       \"Have you been to the ER, urgent care clinic since your last visit?  Hospitalized since your last visit?\"    NO    “Have you seen or consulted any other health care providers outside of [facility] since your last visit?”    NO       Vitals:    24 1554   BP: 135/87   Pulse: 89   Resp: 18   Temp: 97.9 °F (36.6 °C)   TempSrc: Infrared   SpO2: 93%   Weight: 86.2 kg (190 lb)   Height: 1.854 m (6' 1\")        Health Maintenance Due   Topic Date Due    Lipids  2023    Prostate Specific Antigen (PSA) Screening or Monitoring  2023    Annual Wellness Visit (Medicare)  2024    Depression Screen  2024        The patient, Rufus Keller, identity was verified by name and    
extended release tablet Take 1 tablet by mouth in the morning and 1 tablet at noon and 1 tablet in the evening.      aspirin 81 MG EC tablet Take by mouth daily      vitamin D 25 MCG (1000 UT) CAPS One tab daily      cyanocobalamin 1000 MCG tablet Take 1 tablet by mouth daily      diclofenac (VOLTAREN) 75 MG EC tablet Take 1 tablet by mouth 2 times daily as needed      furosemide (LASIX) 40 MG tablet Take 1 tablet by mouth daily      lidocaine (LIDODERM) 5 % Apply patch to the affected area for 12 hours a day and remove for 12 hours a day.      rosuvastatin (CRESTOR) 5 MG tablet Take 1 tablet by mouth      methylPREDNISolone (MEDROL DOSEPACK) 4 MG tablet Take by mouth. (Patient not taking: Reported on 6/18/2024) 21 tablet 0    potassium chloride (KLOR-CON M) 20 MEQ extended release tablet Take 1 tablet by mouth 3 times daily (Patient not taking: Reported on 5/23/2023)       No current facility-administered medications on file prior to visit.     No Known Allergies    Review of Systems    Ear discomfort,  who presents for evaluation of a plugged painful LT ear,  has noticed the symptoms ove the last 3 day, There is no a prior history of cerumen impaction, nor hx of daily swimming nor of Ear submerging into water, or exposed to traumatic wind injury, has no discharge, the hearing decreased and patient does daily Q tips cleaning, pain is 4/10 dull, none radiating with some HA taken OTC not helping, no other complaint today, no other foreign Impaction, red and just painful to touch or laying on the pilow, not getting better       Objective:      /87   Pulse 89   Temp 97.9 °F (36.6 °C) (Infrared)   Resp 18   Ht 1.854 m (6' 1\")   Wt 86.2 kg (190 lb)   SpO2 93%   BMI 25.07 kg/m²   General: alert, appears stated age, and cooperative   Right Ear: left TM dull, serous middle ear fluid, purulent middle ear fluid, and bullous lesion(s) seen and left canal ceruminous   Left Ear:  left TM diminished mobility, dull,

## 2024-07-29 ENCOUNTER — OFFICE VISIT (OUTPATIENT)
Age: 79
End: 2024-07-29
Payer: MEDICARE

## 2024-07-29 VITALS
RESPIRATION RATE: 17 BRPM | SYSTOLIC BLOOD PRESSURE: 160 MMHG | TEMPERATURE: 97.1 F | DIASTOLIC BLOOD PRESSURE: 88 MMHG | WEIGHT: 180 LBS | OXYGEN SATURATION: 96 % | BODY MASS INDEX: 23.75 KG/M2 | HEART RATE: 71 BPM

## 2024-07-29 DIAGNOSIS — R60.0 LOCALIZED EDEMA: ICD-10-CM

## 2024-07-29 DIAGNOSIS — M79.604 PAIN IN BOTH LOWER EXTREMITIES: ICD-10-CM

## 2024-07-29 DIAGNOSIS — M25.511 CHRONIC RIGHT SHOULDER PAIN: ICD-10-CM

## 2024-07-29 DIAGNOSIS — E87.6 HYPOKALEMIA: Primary | ICD-10-CM

## 2024-07-29 DIAGNOSIS — G89.29 CHRONIC RIGHT SHOULDER PAIN: ICD-10-CM

## 2024-07-29 DIAGNOSIS — M79.605 PAIN IN BOTH LOWER EXTREMITIES: ICD-10-CM

## 2024-07-29 DIAGNOSIS — E53.8 B12 DEFICIENCY: ICD-10-CM

## 2024-07-29 PROCEDURE — G8427 DOCREV CUR MEDS BY ELIG CLIN: HCPCS | Performed by: FAMILY MEDICINE

## 2024-07-29 PROCEDURE — 99213 OFFICE O/P EST LOW 20 MIN: CPT | Performed by: FAMILY MEDICINE

## 2024-07-29 PROCEDURE — 1036F TOBACCO NON-USER: CPT | Performed by: FAMILY MEDICINE

## 2024-07-29 PROCEDURE — 3079F DIAST BP 80-89 MM HG: CPT | Performed by: FAMILY MEDICINE

## 2024-07-29 PROCEDURE — G8420 CALC BMI NORM PARAMETERS: HCPCS | Performed by: FAMILY MEDICINE

## 2024-07-29 PROCEDURE — 1123F ACP DISCUSS/DSCN MKR DOCD: CPT | Performed by: FAMILY MEDICINE

## 2024-07-29 PROCEDURE — 3077F SYST BP >= 140 MM HG: CPT | Performed by: FAMILY MEDICINE

## 2024-07-29 RX ORDER — TIZANIDINE 4 MG/1
4 TABLET ORAL NIGHTLY PRN
Qty: 30 TABLET | Refills: 2 | Status: SHIPPED | OUTPATIENT
Start: 2024-07-29

## 2024-07-29 RX ORDER — FUROSEMIDE 40 MG/1
40 TABLET ORAL DAILY
Qty: 90 TABLET | Refills: 0 | Status: SHIPPED | OUTPATIENT
Start: 2024-07-29

## 2024-07-29 RX ORDER — METHYLPREDNISOLONE 4 MG/1
TABLET ORAL
Qty: 21 TABLET | Refills: 0 | Status: SHIPPED | OUTPATIENT
Start: 2024-07-29

## 2024-07-29 RX ORDER — POTASSIUM CHLORIDE 20 MEQ/1
20 TABLET, EXTENDED RELEASE ORAL DAILY
Qty: 30 TABLET | Refills: 2 | Status: SHIPPED | OUTPATIENT
Start: 2024-07-29

## 2024-07-29 ASSESSMENT — PATIENT HEALTH QUESTIONNAIRE - PHQ9
SUM OF ALL RESPONSES TO PHQ QUESTIONS 1-9: 0
SUM OF ALL RESPONSES TO PHQ QUESTIONS 1-9: 0
1. LITTLE INTEREST OR PLEASURE IN DOING THINGS: NOT AT ALL
SUM OF ALL RESPONSES TO PHQ QUESTIONS 1-9: 0
SUM OF ALL RESPONSES TO PHQ9 QUESTIONS 1 & 2: 0
2. FEELING DOWN, DEPRESSED OR HOPELESS: NOT AT ALL
SUM OF ALL RESPONSES TO PHQ QUESTIONS 1-9: 0

## 2024-07-29 NOTE — PROGRESS NOTES
Rufus Keller (:  1945) is a 79 y.o. male,Established patient, here for evaluation of the following chief complaint(s):  Leg Swelling (Left leg )  Opted on lab testing he has to go back to work      Off bp and fluid pill    Constitutional: no chills and fever,  nad     HENT: no ear pain or nosebleeds. No blurred vision    Respiratory: no shortness of breath, wheezing cough     Cardiovascular: Has no chest pain, ,and racing heart .   Gastrointestinal: No constipation, diarrhea, nausea and vomiting.   Genitourinary: No frequency.   Musculoskeletal: Negative for joint pain, bilateral leg swelling no pain  Skin: no itching, no rash.   Neurological: Negative for dizziness, no tremors  Psychiatric/Behavioral: Negative for depression   is not nervous/anxious.   and not depressed the patient is not nervous/anxious.      General:  alert cooperative appears stated for the age  HEENT; normocephalic and atraumatic PERRLA extraocular motor intact normal tympanic membrane normal external ear bilaterally, mucosal normal no drainage  Neck: supple no adenopathy no JVD no bruit  Lungs: Clear to auscultation bilaterally no rales rhonchi or wheezes  Heart: Normal regular S1-S2 ,  no murmur  Breast exam deferred  Abdomen: Soft nontender normal bowel sounds   Extremities: Normal range of motion no point tenderness normal pulses ++ 1 pitting, + edema  Pelvic/: No lesion, no lymphadenopathy  Skin: Normal no lesion no rash    HTN   pt also present for Bp check , ran out of his meds and not compliant w/ the bp meds, having a low salt diet, an  active life style, patient does not obtain the bp at home  , today the pt denies Chest Pain, has +++legs swelling no lightheadedness,the pat has not been feeling anxious, and  Has not been feeling stressed out, otherwise feeling better since the last visit    Constitutional: no chills and fever,  nad     HENT: no ear pain or nosebleeds. No blurred vision    Respiratory: no shortness of

## 2024-07-29 NOTE — PROGRESS NOTES
Chief Complaint   Patient presents with    Follow-up       \"Have you been to the ER, urgent care clinic since your last visit?  Hospitalized since your last visit?\"    NO    “Have you seen or consulted any other health care providers outside of [facility] since your last visit?”    NO            Vitals:    24 1511 24 1513   BP: (!) 164/96 (!) 160/88   Site: Left Upper Arm Right Upper Arm   Position: Sitting Sitting   Cuff Size: Large Adult Large Adult   Pulse: 71    Resp: 17    Temp: 97.1 °F (36.2 °C)    TempSrc: Infrared    SpO2: 96%    Weight: 81.6 kg (180 lb)         Health Maintenance Due   Topic Date Due    Lipids  2023    Prostate Specific Antigen (PSA) Screening or Monitoring  2023        The patient, Rufus Keller, identity was verified by name and

## 2024-10-31 ENCOUNTER — OFFICE VISIT (OUTPATIENT)
Age: 79
End: 2024-10-31
Payer: MEDICARE

## 2024-10-31 VITALS
DIASTOLIC BLOOD PRESSURE: 97 MMHG | BODY MASS INDEX: 24.41 KG/M2 | TEMPERATURE: 97.8 F | SYSTOLIC BLOOD PRESSURE: 168 MMHG | WEIGHT: 185 LBS | RESPIRATION RATE: 17 BRPM | HEART RATE: 93 BPM | OXYGEN SATURATION: 94 %

## 2024-10-31 DIAGNOSIS — E87.6 HYPOKALEMIA: ICD-10-CM

## 2024-10-31 DIAGNOSIS — Z91.81 AT HIGH RISK FOR FALLS: ICD-10-CM

## 2024-10-31 DIAGNOSIS — I10 PRIMARY HYPERTENSION: Primary | ICD-10-CM

## 2024-10-31 DIAGNOSIS — M79.89 LEG SWELLING: ICD-10-CM

## 2024-10-31 DIAGNOSIS — G63 VITAMIN B12 DEFICIENCY NEUROPATHY (HCC): ICD-10-CM

## 2024-10-31 DIAGNOSIS — G63 POLYNEUROPATHY IN DISEASES CLASSIFIED ELSEWHERE (HCC): ICD-10-CM

## 2024-10-31 DIAGNOSIS — R60.0 LOCALIZED EDEMA: ICD-10-CM

## 2024-10-31 DIAGNOSIS — H54.7 DECREASED VISION: ICD-10-CM

## 2024-10-31 DIAGNOSIS — E53.8 VITAMIN B12 DEFICIENCY NEUROPATHY (HCC): ICD-10-CM

## 2024-10-31 DIAGNOSIS — N18.30 STAGE 3 CHRONIC KIDNEY DISEASE, UNSPECIFIED WHETHER STAGE 3A OR 3B CKD (HCC): ICD-10-CM

## 2024-10-31 LAB
ALBUMIN SERPL-MCNC: 3.9 G/DL (ref 3.5–5)
ALBUMIN/GLOB SERPL: 1.2 (ref 1.1–2.2)
ALP SERPL-CCNC: 99 U/L (ref 45–117)
ALT SERPL-CCNC: 18 U/L (ref 12–78)
ANION GAP SERPL CALC-SCNC: 6 MMOL/L (ref 2–12)
AST SERPL-CCNC: 26 U/L (ref 15–37)
BILIRUB SERPL-MCNC: 0.7 MG/DL (ref 0.2–1)
BUN SERPL-MCNC: 10 MG/DL (ref 6–20)
BUN/CREAT SERPL: 8 (ref 12–20)
CALCIUM SERPL-MCNC: 9.2 MG/DL (ref 8.5–10.1)
CHLORIDE SERPL-SCNC: 103 MMOL/L (ref 97–108)
CHOLEST SERPL-MCNC: 162 MG/DL
CO2 SERPL-SCNC: 30 MMOL/L (ref 21–32)
CREAT SERPL-MCNC: 1.19 MG/DL (ref 0.7–1.3)
ERYTHROCYTE [DISTWIDTH] IN BLOOD BY AUTOMATED COUNT: 14 % (ref 11.5–14.5)
GLOBULIN SER CALC-MCNC: 3.2 G/DL (ref 2–4)
GLUCOSE SERPL-MCNC: 91 MG/DL (ref 65–100)
HCT VFR BLD AUTO: 44.7 % (ref 36.6–50.3)
HDLC SERPL-MCNC: 94 MG/DL
HDLC SERPL: 1.7 (ref 0–5)
HGB BLD-MCNC: 14.3 G/DL (ref 12.1–17)
LDLC SERPL CALC-MCNC: 57.6 MG/DL (ref 0–100)
MCH RBC QN AUTO: 28.1 PG (ref 26–34)
MCHC RBC AUTO-ENTMCNC: 32 G/DL (ref 30–36.5)
MCV RBC AUTO: 87.8 FL (ref 80–99)
NRBC # BLD: 0 K/UL (ref 0–0.01)
NRBC BLD-RTO: 0 PER 100 WBC
PLATELET # BLD AUTO: 132 K/UL (ref 150–400)
PMV BLD AUTO: 12.4 FL (ref 8.9–12.9)
POTASSIUM SERPL-SCNC: 4 MMOL/L (ref 3.5–5.1)
PROT SERPL-MCNC: 7.1 G/DL (ref 6.4–8.2)
RBC # BLD AUTO: 5.09 M/UL (ref 4.1–5.7)
SODIUM SERPL-SCNC: 139 MMOL/L (ref 136–145)
TRIGL SERPL-MCNC: 52 MG/DL
VLDLC SERPL CALC-MCNC: 10.4 MG/DL
WBC # BLD AUTO: 4.2 K/UL (ref 4.1–11.1)

## 2024-10-31 PROCEDURE — 99213 OFFICE O/P EST LOW 20 MIN: CPT | Performed by: FAMILY MEDICINE

## 2024-10-31 RX ORDER — POTASSIUM CHLORIDE 1500 MG/1
20 TABLET, EXTENDED RELEASE ORAL DAILY
Qty: 30 TABLET | Refills: 3 | Status: SHIPPED | OUTPATIENT
Start: 2024-10-31

## 2024-10-31 RX ORDER — METOLAZONE 10 MG/1
10 TABLET ORAL DAILY
Qty: 90 TABLET | Refills: 1 | Status: SHIPPED | OUTPATIENT
Start: 2024-10-31

## 2024-10-31 ASSESSMENT — PATIENT HEALTH QUESTIONNAIRE - PHQ9
SUM OF ALL RESPONSES TO PHQ QUESTIONS 1-9: 0
2. FEELING DOWN, DEPRESSED OR HOPELESS: NOT AT ALL
SUM OF ALL RESPONSES TO PHQ QUESTIONS 1-9: 0
SUM OF ALL RESPONSES TO PHQ QUESTIONS 1-9: 0
SUM OF ALL RESPONSES TO PHQ9 QUESTIONS 1 & 2: 0
1. LITTLE INTEREST OR PLEASURE IN DOING THINGS: NOT AT ALL
SUM OF ALL RESPONSES TO PHQ QUESTIONS 1-9: 0

## 2024-10-31 NOTE — ASSESSMENT & PLAN NOTE
Orders:    Lipid Panel; Future    metOLazone (ZAROXOLYN) 10 MG tablet; Take 1 tablet by mouth daily ceived the following from [facility]: Outside name: metOLazone (ZAROXOLYN) 5 mg tabletceived the following from [facility]: Outside name: metOLazone (ZAROXOLYN) 5 mg tablet    potassium chloride (KLOR-CON M) 20 MEQ extended release tablet; Take 1 tablet by mouth daily    CBC; Future    Comprehensive Metabolic Panel; Future

## 2024-10-31 NOTE — PROGRESS NOTES
Rufus Keller (:  1945) is a 79 y.o. male,Established patient, here for evaluation of the following chief complaint(s):  Hypertension (Follow up )    HTN   pt also present for Bp check , compliant w/ the bp meds, a low salt diet, an  active life style, patient does not obtain the bp at home,    today the pt denies Chest Pain, has +++legs swelling no lightheadedness,    the pat has not been feeling anxious, and  Has not been feeling stressed out, otherwise feeling better since the last visit    Constitutional: no chills and fever,  nad     HENT: no ear pain or nosebleeds. No blurred vision  Respiratory: no shortness of breath, wheezing cough   Cardiovascular: Has no chest pain, ,and racing heart .   Gastrointestinal: No constipation, diarrhea, nausea and vomiting.   Genitourinary: No frequency.   Musculoskeletal: Negative for joint pain, +++bilateral leg swelling no pain  Skin: no itching, no rash.   Neurological: Negative for dizziness, no tremors  Psychiatric/Behavioral: Negative for depression   is not nervous/anxious.   and not depressed the patient is not nervous/anxious.        General:  alert cooperative appears stated for the age  HEENT; normocephalic and atraumatic PERRLA extraocular motor intact normal tympanic membrane normal external ear bilaterally, mucosal normal no drainage  Neck: supple no adenopathy no JVD no bruit  Lungs: Clear to auscultation bilaterally no rales rhonchi or wheezes  Heart: Normal regular S1-S2 ,  no murmur  Breast exam deferred  Abdomen: Soft nontender normal bowel sounds   Extremities: Normal range of motion no point tenderness normal pulses ++ 1 pitting, + edema  Pelvic/: No lesion, no lymphadenopathy  Skin: Normal no lesion no rash   Assessment & Plan  Primary hypertension   Orders:    Lipid Panel; Future    metOLazone (ZAROXOLYN) 10 MG tablet; Take 1 tablet by mouth daily ceived the following from [facility]: Outside name: metOLazone (ZAROXOLYN) 5 mg

## 2024-10-31 NOTE — PROGRESS NOTES
Chief Complaint   Patient presents with    Hypertension     Follow up        \"Have you been to the ER, urgent care clinic since your last visit?  Hospitalized since your last visit?\"    NO    “Have you seen or consulted any other health care providers outside of [facility] since your last visit?”    NO            Click Here for Release of Records Request     No results found for this visit on 10/31/24.   Vitals:    10/31/24 1026 10/31/24 1029   BP: (!) 161/94 (!) 168/97   Site: Left Upper Arm Right Upper Arm   Position: Sitting Sitting   Cuff Size: Large Adult Large Adult   Pulse: 93    Resp: 17    Temp: 97.8 °F (36.6 °C)    TempSrc: Infrared    SpO2: 94%    Weight: 83.9 kg (185 lb)         The patient, Rufus DASHA Keller, identity was verified by name and .

## 2024-12-19 ENCOUNTER — OFFICE VISIT (OUTPATIENT)
Age: 79
End: 2024-12-19
Payer: MEDICARE

## 2024-12-19 VITALS
OXYGEN SATURATION: 99 % | RESPIRATION RATE: 20 BRPM | SYSTOLIC BLOOD PRESSURE: 107 MMHG | TEMPERATURE: 98.2 F | HEIGHT: 73 IN | DIASTOLIC BLOOD PRESSURE: 70 MMHG | WEIGHT: 186.6 LBS | HEART RATE: 96 BPM | BODY MASS INDEX: 24.73 KG/M2
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
## 2024-12-19 DIAGNOSIS — S89.92XA INJURY OF LEFT KNEE, INITIAL ENCOUNTER: ICD-10-CM

## 2024-12-19 DIAGNOSIS — M25.562 ACUTE PAIN OF LEFT KNEE: ICD-10-CM

## 2024-12-19 PROCEDURE — 99213 OFFICE O/P EST LOW 20 MIN: CPT | Performed by: FAMILY MEDICINE

## 2024-12-19 RX ORDER — TRIAMCINOLONE ACETONIDE 1 MG/G
CREAM TOPICAL
Qty: 45 G | Refills: 3 | Status: SHIPPED | OUTPATIENT
Start: 2024-12-19

## 2024-12-19 RX ORDER — PREDNISONE 20 MG/1
20 TABLET ORAL DAILY
Qty: 5 TABLET | Refills: 0 | Status: SHIPPED | OUTPATIENT
Start: 2024-12-19 | End: 2024-12-24

## 2024-12-19 RX ORDER — DICLOFENAC SODIUM 75 MG/1
75 TABLET, DELAYED RELEASE ORAL 2 TIMES DAILY PRN
Qty: 30 TABLET | Refills: 1 | Status: SHIPPED | OUTPATIENT
Start: 2024-12-19

## 2024-12-19 ASSESSMENT — PATIENT HEALTH QUESTIONNAIRE - PHQ9
SUM OF ALL RESPONSES TO PHQ QUESTIONS 1-9: 0
SUM OF ALL RESPONSES TO PHQ9 QUESTIONS 1 & 2: 0
2. FEELING DOWN, DEPRESSED OR HOPELESS: NOT AT ALL
SUM OF ALL RESPONSES TO PHQ QUESTIONS 1-9: 0
1. LITTLE INTEREST OR PLEASURE IN DOING THINGS: NOT AT ALL

## 2024-12-19 NOTE — PROGRESS NOTES
Rufus Keller (:  1945) is a 79 y.o. male,Established patient, here for evaluation of the following chief complaint(s):  Follow-up (Patient complains of swelling left knee with off and on pain/Patient states he fell at work last night ) and Hypertension  stating that it did happen at work patient had heavy object on hand and  was trying to open up the door,  did not go to ER,   didnot hit the head to the hard object, has had no LOC, did not feel dizzy w/ the c/o of left Knee Pain  . The quality of the pain is described as dull. The pain is at a severity of 3/10. Associated symptoms include limited range of motion. Pertinent negatives include no numbness, no stiffness, no tingling, no itching, no back pain and no neck pain. The symptoms are aggravated by movement and palpation. There has been no history of extremity trauma.         Constitutional: no chills and fever,nad     HENT: no ear pain and nosebleeds. No blurred vision,   Respiratory: no shortness of breath, wheezing cough nor sore throat.    Cardiovascular: Has no chest pain, leg swelling ,and racing heart .   Gastrointestinal: No constipation, diarrhea, nausea and vomiting.   Genitourinary: No frequency.   Musculoskeletal: +++for multiple joint pain.   Skin: no itching, pimples   Neurological: Negative for dizziness, no tremors  Psychiatric/Behavioral: Negative for depression,  not nervous/anxious.        General: Patient alert cooperative   HEENT; normocephalic and atraumatic     Neck: supple no adenopathy no JVD no bruit  Lungs: Clear to auscultation bilaterally no rales rhonchi or wheezes  Heart: Normal regular S1-S2 s no murmur  Breast exam deferred  Abdomen: Soft nontender normal bowel sounds    Extremities: abnormal range of motion ++ point tenderness normal pulses no edema,   Pelvic/: No lesion, no lymphadenopathy  Skin: abormal no lesion no rash         Assessment & Plan  Fall, initial encounter   New, not at goal (unstable), continue

## 2024-12-19 NOTE — PROGRESS NOTES
Chief Complaint   Patient presents with    Follow-up     Patient complains of swelling left knee with off and on pain  Patient states he fell at work last night     Hypertension       \"Have you been to the ER, urgent care clinic since your last visit?  Hospitalized since your last visit?\"    NO    “Have you seen or consulted any other health care providers outside of [facility] since your last visit?”    NO            Click Here for Release of Records Request     No results found for this visit on 24.   Vitals:    24 1010   BP: 107/70   Site: Left Upper Arm   Position: Sitting   Cuff Size: Large Adult   Pulse: 96   Resp: 20   Temp: 98.2 °F (36.8 °C)   TempSrc: Temporal   SpO2: 99%   Weight: 84.6 kg (186 lb 9.6 oz)   Height: 1.854 m (6' 1\")      Health Maintenance Due   Topic Date Due    Prostate Specific Antigen (PSA) Screening or Monitoring  2023        The patient, Rufus Keller, identity was verified by name and .

## 2025-03-19 ENCOUNTER — OFFICE VISIT (OUTPATIENT)
Age: 80
End: 2025-03-19
Payer: MEDICARE

## 2025-03-19 VITALS
SYSTOLIC BLOOD PRESSURE: 166 MMHG | BODY MASS INDEX: 25.45 KG/M2 | WEIGHT: 192 LBS | RESPIRATION RATE: 17 BRPM | HEART RATE: 85 BPM | HEIGHT: 73 IN | OXYGEN SATURATION: 98 % | DIASTOLIC BLOOD PRESSURE: 103 MMHG | TEMPERATURE: 97.5 F
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
## 2025-03-19 DIAGNOSIS — S89.92XA INJURY OF LEFT KNEE, INITIAL ENCOUNTER: ICD-10-CM

## 2025-03-19 DIAGNOSIS — M25.562 ACUTE PAIN OF LEFT KNEE: Primary | ICD-10-CM

## 2025-03-19 PROCEDURE — 1123F ACP DISCUSS/DSCN MKR DOCD: CPT | Performed by: FAMILY MEDICINE

## 2025-03-19 PROCEDURE — G8427 DOCREV CUR MEDS BY ELIG CLIN: HCPCS | Performed by: FAMILY MEDICINE

## 2025-03-19 PROCEDURE — 99213 OFFICE O/P EST LOW 20 MIN: CPT | Performed by: FAMILY MEDICINE

## 2025-03-19 PROCEDURE — G8419 CALC BMI OUT NRM PARAM NOF/U: HCPCS | Performed by: FAMILY MEDICINE

## 2025-03-19 PROCEDURE — 1125F AMNT PAIN NOTED PAIN PRSNT: CPT | Performed by: FAMILY MEDICINE

## 2025-03-19 PROCEDURE — 1159F MED LIST DOCD IN RCRD: CPT | Performed by: FAMILY MEDICINE

## 2025-03-19 PROCEDURE — 1036F TOBACCO NON-USER: CPT | Performed by: FAMILY MEDICINE

## 2025-03-19 PROCEDURE — 3078F DIAST BP <80 MM HG: CPT | Performed by: FAMILY MEDICINE

## 2025-03-19 PROCEDURE — 3077F SYST BP >= 140 MM HG: CPT | Performed by: FAMILY MEDICINE

## 2025-03-19 PROCEDURE — 1160F RVW MEDS BY RX/DR IN RCRD: CPT | Performed by: FAMILY MEDICINE

## 2025-03-19 RX ORDER — DICLOFENAC SODIUM 75 MG/1
75 TABLET, DELAYED RELEASE ORAL 2 TIMES DAILY PRN
Qty: 30 TABLET | Refills: 1 | Status: SHIPPED | OUTPATIENT
Start: 2025-03-19

## 2025-03-19 RX ORDER — TRAMADOL HYDROCHLORIDE 50 MG/1
50 TABLET ORAL 2 TIMES DAILY PRN
Qty: 42 TABLET | Refills: 0 | Status: SHIPPED | OUTPATIENT
Start: 2025-03-19 | End: 2025-04-09

## 2025-03-19 RX ORDER — PREDNISONE 20 MG/1
20 TABLET ORAL DAILY
Qty: 10 TABLET | Refills: 0 | Status: SHIPPED | OUTPATIENT
Start: 2025-03-19 | End: 2025-03-29

## 2025-03-19 SDOH — ECONOMIC STABILITY: FOOD INSECURITY: WITHIN THE PAST 12 MONTHS, YOU WORRIED THAT YOUR FOOD WOULD RUN OUT BEFORE YOU GOT MONEY TO BUY MORE.: NEVER TRUE

## 2025-03-19 SDOH — ECONOMIC STABILITY: FOOD INSECURITY: WITHIN THE PAST 12 MONTHS, THE FOOD YOU BOUGHT JUST DIDN'T LAST AND YOU DIDN'T HAVE MONEY TO GET MORE.: NEVER TRUE

## 2025-03-19 ASSESSMENT — PATIENT HEALTH QUESTIONNAIRE - PHQ9
SUM OF ALL RESPONSES TO PHQ QUESTIONS 1-9: 0
2. FEELING DOWN, DEPRESSED OR HOPELESS: NOT AT ALL
1. LITTLE INTEREST OR PLEASURE IN DOING THINGS: NOT AT ALL

## 2025-03-19 NOTE — PROGRESS NOTES
Chief Complaint   Patient presents with    Hypertension     Follow up       \"Have you been to the ER, urgent care clinic since your last visit?  Hospitalized since your last visit?\"    NO    “Have you seen or consulted any other health care providers outside of [facility] since your last visit?”    NO            Click Here for Release of Records Request     No results found for this visit on 25.   Vitals:    25 1009 25 1012 25 1032 25 1033   BP: (!) 182/100 (!) 158/79 (!) 161/94 (!) 166/103   BP Site: Left Upper Arm Right Upper Arm Left Upper Arm Right Upper Arm   Patient Position: Sitting Sitting Sitting Sitting   BP Cuff Size: Large Adult Large Adult Large Adult Large Adult   Pulse: 85      Resp: 17      Temp: 97.5 °F (36.4 °C)      TempSrc: Infrared      SpO2: 98%      Weight: 87.1 kg (192 lb)      Height: 1.854 m (6' 1\")         The patient, Rufus DASHA Keller, identity was verified by name and .
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
## 2025-05-20 ENCOUNTER — OFFICE VISIT (OUTPATIENT)
Age: 80
End: 2025-05-20
Payer: MEDICARE

## 2025-05-20 VITALS
DIASTOLIC BLOOD PRESSURE: 82 MMHG | BODY MASS INDEX: 23.59 KG/M2 | HEIGHT: 73 IN | HEART RATE: 79 BPM | WEIGHT: 178 LBS | TEMPERATURE: 97.9 F | OXYGEN SATURATION: 93 % | RESPIRATION RATE: 18 BRPM | SYSTOLIC BLOOD PRESSURE: 122 MMHG

## 2025-05-20 DIAGNOSIS — M79.89 LEG SWELLING: ICD-10-CM

## 2025-05-20 DIAGNOSIS — H54.7 DECREASED VISION: ICD-10-CM

## 2025-05-20 DIAGNOSIS — G63 POLYNEUROPATHY IN DISEASES CLASSIFIED ELSEWHERE: ICD-10-CM

## 2025-05-20 DIAGNOSIS — E53.8 VITAMIN B12 DEFICIENCY NEUROPATHY: ICD-10-CM

## 2025-05-20 DIAGNOSIS — R60.0 LOCALIZED EDEMA: ICD-10-CM

## 2025-05-20 DIAGNOSIS — Z91.81 AT HIGH RISK FOR FALLS: ICD-10-CM

## 2025-05-20 DIAGNOSIS — E87.6 HYPOKALEMIA: ICD-10-CM

## 2025-05-20 DIAGNOSIS — M25.511 CHRONIC RIGHT SHOULDER PAIN: Primary | ICD-10-CM

## 2025-05-20 DIAGNOSIS — I10 PRIMARY HYPERTENSION: ICD-10-CM

## 2025-05-20 DIAGNOSIS — G63 VITAMIN B12 DEFICIENCY NEUROPATHY: ICD-10-CM

## 2025-05-20 DIAGNOSIS — N18.30 STAGE 3 CHRONIC KIDNEY DISEASE, UNSPECIFIED WHETHER STAGE 3A OR 3B CKD (HCC): ICD-10-CM

## 2025-05-20 DIAGNOSIS — G89.29 CHRONIC RIGHT SHOULDER PAIN: Primary | ICD-10-CM

## 2025-05-20 LAB
ALBUMIN SERPL-MCNC: 4.1 G/DL (ref 3.5–5)
ALBUMIN/GLOB SERPL: 1.3 (ref 1.1–2.2)
ALP SERPL-CCNC: 106 U/L (ref 45–117)
ALT SERPL-CCNC: 20 U/L (ref 12–78)
ANION GAP SERPL CALC-SCNC: 6 MMOL/L (ref 2–12)
AST SERPL-CCNC: 27 U/L (ref 15–37)
BILIRUB SERPL-MCNC: 0.6 MG/DL (ref 0.2–1)
BUN SERPL-MCNC: 16 MG/DL (ref 6–20)
BUN/CREAT SERPL: 11 (ref 12–20)
CALCIUM SERPL-MCNC: 9.8 MG/DL (ref 8.5–10.1)
CHLORIDE SERPL-SCNC: 101 MMOL/L (ref 97–108)
CO2 SERPL-SCNC: 33 MMOL/L (ref 21–32)
CREAT SERPL-MCNC: 1.4 MG/DL (ref 0.7–1.3)
GLOBULIN SER CALC-MCNC: 3.2 G/DL (ref 2–4)
GLUCOSE SERPL-MCNC: 98 MG/DL (ref 65–100)
POTASSIUM SERPL-SCNC: 3.5 MMOL/L (ref 3.5–5.1)
PROT SERPL-MCNC: 7.3 G/DL (ref 6.4–8.2)
SODIUM SERPL-SCNC: 140 MMOL/L (ref 136–145)

## 2025-05-20 PROCEDURE — G8420 CALC BMI NORM PARAMETERS: HCPCS | Performed by: FAMILY MEDICINE

## 2025-05-20 PROCEDURE — 3074F SYST BP LT 130 MM HG: CPT | Performed by: FAMILY MEDICINE

## 2025-05-20 PROCEDURE — 1125F AMNT PAIN NOTED PAIN PRSNT: CPT | Performed by: FAMILY MEDICINE

## 2025-05-20 PROCEDURE — 99214 OFFICE O/P EST MOD 30 MIN: CPT | Performed by: FAMILY MEDICINE

## 2025-05-20 PROCEDURE — 1036F TOBACCO NON-USER: CPT | Performed by: FAMILY MEDICINE

## 2025-05-20 PROCEDURE — 3079F DIAST BP 80-89 MM HG: CPT | Performed by: FAMILY MEDICINE

## 2025-05-20 PROCEDURE — 1123F ACP DISCUSS/DSCN MKR DOCD: CPT | Performed by: FAMILY MEDICINE

## 2025-05-20 PROCEDURE — 1159F MED LIST DOCD IN RCRD: CPT | Performed by: FAMILY MEDICINE

## 2025-05-20 PROCEDURE — G8427 DOCREV CUR MEDS BY ELIG CLIN: HCPCS | Performed by: FAMILY MEDICINE

## 2025-05-20 RX ORDER — POTASSIUM CHLORIDE 1500 MG/1
20 TABLET, EXTENDED RELEASE ORAL DAILY
Qty: 30 TABLET | Refills: 3 | Status: SHIPPED | OUTPATIENT
Start: 2025-05-20 | End: 2025-05-29 | Stop reason: SDUPTHER

## 2025-05-20 RX ORDER — TRAMADOL HYDROCHLORIDE 50 MG/1
50 TABLET ORAL EVERY 8 HOURS PRN
Qty: 42 TABLET | Refills: 0 | Status: SHIPPED | OUTPATIENT
Start: 2025-05-20 | End: 2025-06-19

## 2025-05-20 RX ORDER — METOLAZONE 10 MG/1
10 TABLET ORAL DAILY
Qty: 30 TABLET | Refills: 4 | Status: SHIPPED | OUTPATIENT
Start: 2025-05-20

## 2025-05-20 RX ORDER — FUROSEMIDE 40 MG/1
40 TABLET ORAL DAILY
Qty: 60 TABLET | Refills: 3 | Status: SHIPPED | OUTPATIENT
Start: 2025-05-20

## 2025-05-20 ASSESSMENT — PATIENT HEALTH QUESTIONNAIRE - PHQ9
SUM OF ALL RESPONSES TO PHQ QUESTIONS 1-9: 0
1. LITTLE INTEREST OR PLEASURE IN DOING THINGS: NOT AT ALL
2. FEELING DOWN, DEPRESSED OR HOPELESS: NOT AT ALL
SUM OF ALL RESPONSES TO PHQ QUESTIONS 1-9: 0

## 2025-05-20 NOTE — PROGRESS NOTES
Chief Complaint   Patient presents with    Leg Swelling     Left leg swelling and pain        \"Have you been to the ER, urgent care clinic since your last visit?  Hospitalized since your last visit?\"    NO    “Have you seen or consulted any other health care providers outside of [facility] since your last visit?”    NO        Click Here for Release of Records Request     No results found for this visit on 25.   Vitals:    25 1036   BP: 122/82   Pulse: 79   Resp: 18   Temp: 97.9 °F (36.6 °C)   TempSrc: Temporal   SpO2: 93%   Weight: 80.7 kg (178 lb)   Height: 1.854 m (6' 1\")          The patient, Rufus DASHA [place], identity was verified by name and .

## 2025-05-29 ENCOUNTER — RESULTS FOLLOW-UP (OUTPATIENT)
Age: 80
End: 2025-05-29

## 2025-05-29 RX ORDER — POTASSIUM CHLORIDE 1500 MG/1
30 TABLET, EXTENDED RELEASE ORAL DAILY
Qty: 45 TABLET | Refills: 2 | Status: SHIPPED | OUTPATIENT
Start: 2025-05-29

## 2025-05-29 NOTE — ASSESSMENT & PLAN NOTE
Chronic, not at goal (unstable), continue current treatment plan and medication adherence emphasized    Orders:    metOLazone (ZAROXOLYN) 10 MG tablet; Take 1 tablet by mouth daily Take one tablet 30 minutes before taking furosemide    Comprehensive Metabolic Panel; Future

## 2025-05-29 NOTE — ASSESSMENT & PLAN NOTE
Chronic, at goal (stable), continue current treatment plan    Orders:    metOLazone (ZAROXOLYN) 10 MG tablet; Take 1 tablet by mouth daily Take one tablet 30 minutes before taking furosemide    Comprehensive Metabolic Panel; Future

## 2025-05-29 NOTE — ASSESSMENT & PLAN NOTE
Current condition is chronic, has improved and is stable, will continue current treatment plan, medication adherence emphasized, and patient readvised regarding a better lifestyle modifications ,  reviewed medications and side effects in detail, Daily walking and increase Physical activity recommended ,        Orders:    metOLazone (ZAROXOLYN) 10 MG tablet; Take 1 tablet by mouth daily Take one tablet 30 minutes before taking furosemide    Comprehensive Metabolic Panel; Future

## 2025-05-29 NOTE — PROGRESS NOTES
Rufus Keller (:  1945) is a 80 y.o. male,Established patient, here for evaluation of the following chief complaint(s):  Leg Swelling (Left leg swelling and pain )    Patient complains of edema. Of the lower legs bilateral, ankles bilateral, feet bilateral, it has been moderate.  The condition has been long-standing, and gradually worsening since that time. The edema is present all day, on the pt last visits, the patient did not have much of the legs swelling,  Today the patient denies leg pain, denies rash, and legs lesion,and the denial of dizziness,   the wt went up       Shoulder Pain   The history is provided by the patient. This is a chronic problem. Episode onset: few yrs ago, not obese, patient has a lot of difficulty with overhead activity, raising arm is very painful and the pain  awakens the patient at night,  The problem occurs constantly. The problem has not changed since onset. The pain is present in the lt shoulder. The quality of the pain is described as dull. The pain is at a severity of 8/10. Associated symptoms include limited range of motion. Pertinent negatives include no numbness, ++stiffness, no tingling, no itching, no back pain and + neck pain. The symptoms are aggravated by movement and palpation. There has been no history of extremity trauma.     ROS:  Constitutional: no chills and fever,nad     HENT: no ear pain and nosebleeds. No blurred vision,   Respiratory: no shortness of breath, wheezing cough nor sore throat.    Cardiovascular: Has no chest pain, leg swelling ,and racing heart .   Gastrointestinal: No constipation, diarrhea, nausea and vomiting.   Genitourinary: No frequency.   Musculoskeletal: +++for multiple joint pain, no swelling   Skin: no itching,or pimples   Neurological: Negative for dizziness, having no tremors  Psychiatric/Behavioral: Negative for depression,  not nervous/anxious.      PE:  General: Patient alert cooperative   HEENT; normocephalic and atraumatic,

## 2025-05-29 NOTE — ASSESSMENT & PLAN NOTE
Chronic, not at goal (unstable), continue current treatment plan and medication adherence emphasized    Orders:    metOLazone (ZAROXOLYN) 10 MG tablet; Take 1 tablet by mouth daily Take one tablet 30 minutes before taking furosemide    Comprehensive Metabolic Panel; Future    potassium chloride (KLOR-CON M) 20 MEQ extended release tablet; Take 1.5 tablets by mouth daily

## 2025-05-30 NOTE — PROGRESS NOTES
This is the Subsequent Medicare Annual Wellness Exam, performed 12 months or more after the Initial AWV or the last Subsequent AWV    I have reviewed the patient's medical history in detail and updated the computerized patient record. Depression Risk Factor Screening:     3 most recent PHQ Screens 1/22/2021   Little interest or pleasure in doing things Not at all   Feeling down, depressed, irritable, or hopeless Not at all   Total Score PHQ 2 0       Alcohol Risk Screen    Do you average more than 1 drink per night or more than 7 drinks a week: No    In the past three months have you have had more than 4 drinks containing alcohol on one occasion: No        Functional Ability and Level of Safety:    Hearing: Hearing is good. Activities of Daily Living: The home contains: handrails, grab bars and rugs  Patient does total self care      Ambulation: with no difficulty     Fall Risk:  Fall Risk Assessment, last 12 mths 1/22/2021   Able to walk? Yes   Fall in past 12 months? 0   Do you feel unsteady? 0   Are you worried about falling 0      Abuse Screen:  Patient is not abused       Cognitive Screening    Has your family/caregiver stated any concerns about your memory: no     Cognitive Screening: Normal - MMSE (Mini Mental Status Exam)    Assessment/Plan   Education and counseling provided:  Are appropriate based on today's review and evaluation  Influenza Vaccine  Prostate cancer screening tests (PSA, covered annually)  Colorectal cancer screening tests    Diagnoses and all orders for this visit:    1. Encounter for immunization  -     ADMIN INFLUENZA VIRUS VAC  -     INFLUENZA VIRUS VACCINE, HIGH DOSE SEASONAL, PRESERVATIVE FREE    2. Localized edema  -     metOLazone (ZAROXOLYN) 5 mg tablet; Take 1 Tab by mouth daily. 3. Hypertension, unspecified type  -     metOLazone (ZAROXOLYN) 5 mg tablet; Take 1 Tab by mouth daily. 4. Platelets decreased (HCC)  -     metOLazone (ZAROXOLYN) 5 mg tablet;  Take 1 Tab by mouth daily. 5. B12 deficiency  -     metOLazone (ZAROXOLYN) 5 mg tablet; Take 1 Tab by mouth daily. 6. Vitamin B12 deficiency neuropathy (HCC)  -     metOLazone (ZAROXOLYN) 5 mg tablet; Take 1 Tab by mouth daily. 7. Medicare annual wellness visit, subsequent    8.  Advanced directives, counseling/discussion  -     FULL CODE    9. Screening for alcoholism  -     GA ANNUAL ALCOHOL SCREEN 15 MIN        Health Maintenance Due     Health Maintenance Due   Topic Date Due    COVID-19 Vaccine (1 of 2) 02/09/1961    Shingrix Vaccine Age 50> (1 of 2) 02/09/1997    GLAUCOMA SCREENING Q2Y  02/09/2012    A1C test (Diabetic or Prediabetic)  01/13/2017    Colorectal Cancer Screening Combo  02/05/2020       Patient Care Team   Patient Care Team:  Victoria Brown MD as PCP - General (Family Medicine)  Victoria Brown MD as PCP - [facility] Empaneled Provider    History         Patient able to drive no recent accident, Patient compare self health better to others with the same age,   patient with normal hearing normal vision able to do all ADL currently working full-time, having had no fall and no incontinence having normal appetite no weight loss since last seen eating fruits and vegetables every day does not do exercises denies any substance abuse, still working PT,     Unfortunately patient has couple other problems and concerns which were not included in annual wellness exam visit,         Patient Active Problem List   Diagnosis Code    HTN (hypertension) I10    B12 deficiency E53.8    Pre-diabetes R73.03    WBC decreased D72.819    Elevated PSA, less than 10 ng/ml R97.20    Hypercholesteremia E78.00    Platelets decreased ([address] D69.6    Vitamin D deficiency E55.9    Hypokalemia E87.6    Localized edema R60.0    Patient is full code Z78.9    Vitamin B12 deficiency neuropathy ([facility] [address] E53.8, G63     Past Medical History:   Diagnosis Date    Elevated PSA, less than 10 ng/ml 12/10/2012    Hypertension     Hypokalemia 7/25/2014    Localized edema 2/7/2017    Patient is full code 9/18/2018    Vitamin B12 deficiency neuropathy ([facility] [address] 6/18/2019    Vitamin D deficiency 7/8/2014    WBC decreased 7/6/2012      Past Surgical History:   Procedure Laterality Date    COLONOSCOPY,DIAGNOSTIC  2/5/2015         HX COLONOSCOPY      HX ORTHOPAEDIC      left knee surg in  1984     Current Outpatient Medications   Medication Sig Dispense Refill    cholecalciferol (Vitamin D3) 25 mcg (1,000 unit) cap One tab daily 30 Cap 11    potassium chloride (K-DUR, KLOR-CON) 20 mEq tablet Take 1 Tab by mouth daily. Indications: prevention of low potassium in the blood 90 Tab 1    metOLazone (ZAROXOLYN) 5 mg tablet Take 1 Tab by mouth daily. 90 Tab 1    aspirin delayed-release 81 mg tablet Take  by mouth daily. No Known Allergies    Family History   Problem Relation Age of Onset    Kidney Disease Father      Social History     Tobacco Use    Smoking status: Never Smoker    Smokeless tobacco: Never Used   Substance Use Topics    Alcohol use:  No (2) Performs neither task correctly

## 2025-06-25 ENCOUNTER — OFFICE VISIT (OUTPATIENT)
Age: 80
End: 2025-06-25
Payer: MEDICARE

## 2025-06-25 VITALS
DIASTOLIC BLOOD PRESSURE: 73 MMHG | SYSTOLIC BLOOD PRESSURE: 111 MMHG | OXYGEN SATURATION: 96 % | WEIGHT: 175 LBS | HEART RATE: 88 BPM | TEMPERATURE: 97.3 F | RESPIRATION RATE: 17 BRPM | BODY MASS INDEX: 23.19 KG/M2 | HEIGHT: 73 IN

## 2025-06-25 DIAGNOSIS — R79.9 ABNORMAL FINDING OF BLOOD CHEMISTRY, UNSPECIFIED: ICD-10-CM

## 2025-06-25 DIAGNOSIS — I10 PRIMARY HYPERTENSION: ICD-10-CM

## 2025-06-25 DIAGNOSIS — R60.0 LOCALIZED EDEMA: ICD-10-CM

## 2025-06-25 DIAGNOSIS — E53.8 B12 DEFICIENCY: ICD-10-CM

## 2025-06-25 DIAGNOSIS — M79.89 LEG SWELLING: ICD-10-CM

## 2025-06-25 DIAGNOSIS — N18.31 STAGE 3A CHRONIC KIDNEY DISEASE (HCC): ICD-10-CM

## 2025-06-25 DIAGNOSIS — Z00.00 MEDICARE ANNUAL WELLNESS VISIT, SUBSEQUENT: Primary | ICD-10-CM

## 2025-06-25 LAB
T4 FREE SERPL-MCNC: 1.1 NG/DL (ref 0.8–1.5)
TSH SERPL DL<=0.05 MIU/L-ACNC: 2.43 UIU/ML (ref 0.36–3.74)

## 2025-06-25 PROCEDURE — 1160F RVW MEDS BY RX/DR IN RCRD: CPT | Performed by: FAMILY MEDICINE

## 2025-06-25 PROCEDURE — 1159F MED LIST DOCD IN RCRD: CPT | Performed by: FAMILY MEDICINE

## 2025-06-25 PROCEDURE — 1126F AMNT PAIN NOTED NONE PRSNT: CPT | Performed by: FAMILY MEDICINE

## 2025-06-25 PROCEDURE — 1123F ACP DISCUSS/DSCN MKR DOCD: CPT | Performed by: FAMILY MEDICINE

## 2025-06-25 PROCEDURE — G0439 PPPS, SUBSEQ VISIT: HCPCS | Performed by: FAMILY MEDICINE

## 2025-06-25 PROCEDURE — 3078F DIAST BP <80 MM HG: CPT | Performed by: FAMILY MEDICINE

## 2025-06-25 PROCEDURE — 3074F SYST BP LT 130 MM HG: CPT | Performed by: FAMILY MEDICINE

## 2025-06-25 ASSESSMENT — PATIENT HEALTH QUESTIONNAIRE - PHQ9
1. LITTLE INTEREST OR PLEASURE IN DOING THINGS: NOT AT ALL
SUM OF ALL RESPONSES TO PHQ QUESTIONS 1-9: 0
2. FEELING DOWN, DEPRESSED OR HOPELESS: NOT AT ALL
SUM OF ALL RESPONSES TO PHQ QUESTIONS 1-9: 0

## 2025-06-25 NOTE — PATIENT INSTRUCTIONS
vision is often tested as part of a routine exam. You may also have this test when you apply for a job where recognizing different colors is important, such as , electronics, or the .  How are vision tests done?  Visual acuity test   You cover one eye at a time.  You read aloud from a wall chart across the room.  You read aloud from a small card that you hold in your hand.  Refraction   You look into a special device.  The device puts lenses of different strengths in front of each eye to see how strong your glasses or contact lenses need to be.  Visual field tests   Your doctor may have you look through special machines.  Or your doctor may simply have you stare straight ahead while they move a finger into and out of your field of vision.  Color vision test   You look at pieces of printed test patterns in various colors. You say what number or symbol you see.  Your doctor may have you trace the number or symbol using a pointer.  How do these tests feel?  There is very little chance of having a problem from this test. If dilating drops are used for a vision test, they may make the eyes sting and cause a medicine taste in the mouth.  Follow-up care is a key part of your treatment and safety. Be sure to make and go to all appointments, and call your doctor if you are having problems. It's also a good idea to know your test results and keep a list of the medicines you take.  Where can you learn more?  Go to https://[email or web address].net/patientEd and enter G551 to learn more about \"Learning About Vision Tests.\"  Current as of: July 31, 2024  Content Version: 14.5  © 2024-2025 "[employer]   Care instructions adapted under license by [employer]. If you have questions about a medical condition or this instruction, always ask your healthcare professional. [employer] disclaims any warranty or liability for your use of this information.         Advance Directives: Care

## 2025-06-25 NOTE — PROGRESS NOTES
Chief Complaint   Patient presents with    Medicare AWV       \"Have you been to the ER, urgent care clinic since your last visit?  Hospitalized since your last visit?\"    NO    “Have you seen or consulted any other health care providers outside of [facility] since your last visit?”    NO      Click Here for Release of Records Request     No results found for this visit on 25.   Vitals:    25 0926   Resp: 17   Temp: 97.1 °F (36.2 °C)   TempSrc: Temporal   Weight: 79.4 kg (175 lb)   Height: 1.854 m (6' 1\")      Health Maintenance Due   Topic Date Due    Shingles vaccine (1 of 2) Never done    Respiratory Syncytial Virus (RSV) Pregnant or age 60 yrs+ (1 - 1-dose 75+ series) Never done    Prostate Specific Antigen (PSA) Screening or Monitoring  2023    Annual Wellness Visit (Medicare)  2025        The patient, Rufus Keller, identity was verified by name and .   
rhonchi or wheezes  Heart: Normal regular S1-S2 ,  no murmur  Breast exam deferred  Abdomen: Soft nontender normal bowel sounds   Extremities: Normal range of motion no point tenderness normal pulses ++ 1 pitting, + edema  Pelvic/: No lesion, no lymphadenopathy  Skin: Normal no lesion no rash                No Known Allergies  Prior to Visit Medications    Medication Sig Taking? Authorizing Provider   potassium chloride (KLOR-CON M) 20 MEQ extended release tablet Take 1.5 tablets by mouth daily Yes Hipolito Cooper MD   metOLazone (ZAROXOLYN) 10 MG tablet Take 1 tablet by mouth daily Take one tablet 30 minutes before taking furosemide Yes Hipolito Cooper MD   furosemide (LASIX) 40 MG tablet Take 1 tablet by mouth daily Yes Hipolito Cooper MD   triamcinolone (KENALOG) 0.1 % cream Apply topically 2 times daily. Yes Hipolito Cooper MD   acetaminophen (TYLENOL) 650 MG extended release tablet Take 1 tablet by mouth in the morning and 1 tablet at noon and 1 tablet in the evening. Yes Automatic Reconciliation, Ar   aspirin 81 MG EC tablet Take by mouth daily Yes Automatic Reconciliation, Ar   vitamin D 25 MCG (1000 UT) CAPS One tab daily Yes Automatic Reconciliation, Ar   cyanocobalamin 1000 MCG tablet Take 1 tablet by mouth daily Yes Automatic Reconciliation, Ar   lidocaine (LIDODERM) 5 % Apply patch to the affected area for 12 hours a day and remove for 12 hours a day. Yes Automatic Reconciliation, Ar   rosuvastatin (CRESTOR) 5 MG tablet Take 1 tablet by mouth Yes Automatic Reconciliation, Ar       CareTeam (Including outside providers/suppliers regularly involved in providing care):   Patient Care Team:  Hipolito Cooper MD as PCP - General  Hipolito Cooper MD as PCP - Empaneled Provider  Neetu Umana MD as Physician     Recommendations for Preventive Services Due: see orders and patient instructions/AVS.  Recommended screening schedule for the next 5-10 years is provided to the patient in written

## 2025-06-26 LAB
ALBUMIN SERPL-MCNC: 4 G/DL (ref 3.5–5)
ALBUMIN/GLOB SERPL: 1.2 (ref 1.1–2.2)
ALP SERPL-CCNC: 95 U/L (ref 45–117)
ALT SERPL-CCNC: 24 U/L (ref 12–78)
ANION GAP SERPL CALC-SCNC: 6 MMOL/L (ref 2–12)
APPEARANCE UR: CLEAR
AST SERPL-CCNC: 29 U/L (ref 15–37)
BACTERIA URNS QL MICRO: NEGATIVE /HPF
BASOPHILS # BLD: 0.02 K/UL (ref 0–0.1)
BASOPHILS NFR BLD: 0.4 % (ref 0–1)
BILIRUB SERPL-MCNC: 0.6 MG/DL (ref 0.2–1)
BILIRUB UR QL: NEGATIVE
BUN SERPL-MCNC: 13 MG/DL (ref 6–20)
BUN/CREAT SERPL: 10 (ref 12–20)
CALCIUM SERPL-MCNC: 9.5 MG/DL (ref 8.5–10.1)
CHLORIDE SERPL-SCNC: 105 MMOL/L (ref 97–108)
CHOLEST SERPL-MCNC: 150 MG/DL
CO2 SERPL-SCNC: 29 MMOL/L (ref 21–32)
COLOR UR: NORMAL
CREAT SERPL-MCNC: 1.3 MG/DL (ref 0.7–1.3)
DIFFERENTIAL METHOD BLD: NORMAL
EOSINOPHIL # BLD: 0.17 K/UL (ref 0–0.4)
EOSINOPHIL NFR BLD: 3.6 % (ref 0–7)
EPITH CASTS URNS QL MICRO: NORMAL /LPF
ERYTHROCYTE [DISTWIDTH] IN BLOOD BY AUTOMATED COUNT: 13.8 % (ref 11.5–14.5)
EST. AVERAGE GLUCOSE BLD GHB EST-MCNC: 126 MG/DL
FOLATE SERPL-MCNC: 8.2 NG/ML (ref 5–21)
GLOBULIN SER CALC-MCNC: 3.3 G/DL (ref 2–4)
GLUCOSE SERPL-MCNC: 102 MG/DL (ref 65–100)
GLUCOSE UR STRIP.AUTO-MCNC: NEGATIVE MG/DL
HBA1C MFR BLD: 6 % (ref 4–5.6)
HCT VFR BLD AUTO: 40.3 % (ref 36.6–50.3)
HDLC SERPL-MCNC: 69 MG/DL
HDLC SERPL: 2.2 (ref 0–5)
HGB BLD-MCNC: 13 G/DL (ref 12.1–17)
HGB UR QL STRIP: NEGATIVE
HYALINE CASTS URNS QL MICRO: NORMAL /LPF (ref 0–5)
IMM GRANULOCYTES # BLD AUTO: 0 K/UL (ref 0–0.04)
IMM GRANULOCYTES NFR BLD AUTO: 0 % (ref 0–0.5)
KETONES UR QL STRIP.AUTO: NEGATIVE MG/DL
LDLC SERPL CALC-MCNC: 67.8 MG/DL (ref 0–100)
LEUKOCYTE ESTERASE UR QL STRIP.AUTO: NEGATIVE
LYMPHOCYTES # BLD: 1.11 K/UL (ref 0.8–3.5)
LYMPHOCYTES NFR BLD: 23.7 % (ref 12–49)
MCH RBC QN AUTO: 27.7 PG (ref 26–34)
MCHC RBC AUTO-ENTMCNC: 32.3 G/DL (ref 30–36.5)
MCV RBC AUTO: 85.7 FL (ref 80–99)
MONOCYTES # BLD: 0.45 K/UL (ref 0–1)
MONOCYTES NFR BLD: 9.6 % (ref 5–13)
NEUTS SEG # BLD: 2.94 K/UL (ref 1.8–8)
NEUTS SEG NFR BLD: 62.7 % (ref 32–75)
NITRITE UR QL STRIP.AUTO: NEGATIVE
NRBC # BLD: 0 K/UL (ref 0–0.01)
NRBC BLD-RTO: 0 PER 100 WBC
PH UR STRIP: 7 (ref 5–8)
PLATELET # BLD AUTO: 152 K/UL (ref 150–400)
PMV BLD AUTO: 11.2 FL (ref 8.9–12.9)
POTASSIUM SERPL-SCNC: 3.2 MMOL/L (ref 3.5–5.1)
PROT SERPL-MCNC: 7.3 G/DL (ref 6.4–8.2)
PROT UR STRIP-MCNC: NEGATIVE MG/DL
RBC # BLD AUTO: 4.7 M/UL (ref 4.1–5.7)
RBC #/AREA URNS HPF: NORMAL /HPF (ref 0–5)
SODIUM SERPL-SCNC: 140 MMOL/L (ref 136–145)
SP GR UR REFRACTOMETRY: 1.01 (ref 1–1.03)
TRIGL SERPL-MCNC: 66 MG/DL
URINE CULTURE IF INDICATED: NORMAL
UROBILINOGEN UR QL STRIP.AUTO: 0.2 EU/DL (ref 0.2–1)
VIT B12 SERPL-MCNC: 225 PG/ML (ref 193–986)
VLDLC SERPL CALC-MCNC: 13.2 MG/DL
WBC # BLD AUTO: 4.7 K/UL (ref 4.1–11.1)
WBC URNS QL MICRO: NORMAL /HPF (ref 0–4)